# Patient Record
Sex: FEMALE | Race: WHITE | NOT HISPANIC OR LATINO | Employment: PART TIME | URBAN - METROPOLITAN AREA
[De-identification: names, ages, dates, MRNs, and addresses within clinical notes are randomized per-mention and may not be internally consistent; named-entity substitution may affect disease eponyms.]

---

## 2017-03-01 ENCOUNTER — TELEPHONE (OUTPATIENT)
Dept: OPHTHALMOLOGY | Facility: CLINIC | Age: 72
End: 2017-03-01

## 2017-03-20 RX ORDER — ROSUVASTATIN CALCIUM 40 MG/1
TABLET, COATED ORAL
Qty: 30 TABLET | Refills: 0 | Status: SHIPPED | OUTPATIENT
Start: 2017-03-20 | End: 2017-03-20 | Stop reason: SDUPTHER

## 2017-03-20 RX ORDER — ROSUVASTATIN CALCIUM 40 MG/1
TABLET, COATED ORAL
Qty: 30 TABLET | Refills: 0 | Status: SHIPPED | OUTPATIENT
Start: 2017-03-20 | End: 2017-08-22 | Stop reason: SDUPTHER

## 2017-05-03 ENCOUNTER — TELEPHONE (OUTPATIENT)
Dept: INTERNAL MEDICINE | Facility: CLINIC | Age: 72
End: 2017-05-03

## 2017-05-03 DIAGNOSIS — I10 ESSENTIAL HYPERTENSION, BENIGN: Primary | ICD-10-CM

## 2017-05-03 NOTE — TELEPHONE ENCOUNTER
----- Message from Corina Salmon sent at 5/3/2017  9:15 AM CDT -----  Doctor appointment and lab have been scheduled.  Please link lab orders to the lab appointment.  Date of doctor appointment: 9-5   Physical or EP:  physical  Date of lab appointment:  8-29  Comments:

## 2017-05-15 RX ORDER — ROSUVASTATIN CALCIUM 40 MG/1
TABLET, COATED ORAL
Qty: 30 TABLET | Refills: 0 | Status: SHIPPED | OUTPATIENT
Start: 2017-05-15 | End: 2019-07-18

## 2017-05-29 RX ORDER — ALPRAZOLAM 0.5 MG/1
TABLET ORAL
Qty: 20 TABLET | Refills: 1 | Status: SHIPPED | OUTPATIENT
Start: 2017-05-29 | End: 2018-07-03 | Stop reason: SDUPTHER

## 2017-08-01 RX ORDER — AMLODIPINE BESYLATE 5 MG/1
TABLET ORAL
Qty: 90 TABLET | Refills: 3 | Status: SHIPPED | OUTPATIENT
Start: 2017-08-01 | End: 2018-08-08 | Stop reason: SDUPTHER

## 2017-08-01 RX ORDER — LISINOPRIL 40 MG/1
TABLET ORAL
Qty: 90 TABLET | Refills: 3 | Status: SHIPPED | OUTPATIENT
Start: 2017-08-01 | End: 2018-08-08 | Stop reason: SDUPTHER

## 2017-08-15 ENCOUNTER — CLINICAL SUPPORT (OUTPATIENT)
Dept: CARDIOLOGY | Facility: CLINIC | Age: 72
End: 2017-08-15
Payer: MEDICARE

## 2017-08-15 ENCOUNTER — LAB VISIT (OUTPATIENT)
Dept: LAB | Facility: HOSPITAL | Age: 72
End: 2017-08-15
Attending: INTERNAL MEDICINE
Payer: MEDICARE

## 2017-08-15 DIAGNOSIS — I42.9 CARDIOMYOPATHY: ICD-10-CM

## 2017-08-15 DIAGNOSIS — I44.7 LBBB (LEFT BUNDLE BRANCH BLOCK): ICD-10-CM

## 2017-08-15 DIAGNOSIS — I25.84 CORONARY ARTERY DISEASE DUE TO CALCIFIED CORONARY LESION: ICD-10-CM

## 2017-08-15 DIAGNOSIS — I25.10 CORONARY ARTERY DISEASE DUE TO CALCIFIED CORONARY LESION: ICD-10-CM

## 2017-08-15 DIAGNOSIS — E78.00 HYPERCHOLESTEREMIA: ICD-10-CM

## 2017-08-15 DIAGNOSIS — I10 ESSENTIAL HYPERTENSION: ICD-10-CM

## 2017-08-15 LAB
ALT SERPL W/O P-5'-P-CCNC: 11 U/L
ANION GAP SERPL CALC-SCNC: 10 MMOL/L
AST SERPL-CCNC: 20 U/L
BUN SERPL-MCNC: 9 MG/DL
CALCIUM SERPL-MCNC: 9.2 MG/DL
CHLORIDE SERPL-SCNC: 104 MMOL/L
CHOLEST/HDLC SERPL: 2.6 {RATIO}
CO2 SERPL-SCNC: 25 MMOL/L
CREAT SERPL-MCNC: 0.9 MG/DL
DIASTOLIC DYSFUNCTION: NO
EST. GFR  (AFRICAN AMERICAN): >60 ML/MIN/1.73 M^2
EST. GFR  (NON AFRICAN AMERICAN): >60 ML/MIN/1.73 M^2
ESTIMATED PA SYSTOLIC PRESSURE: 22.85
GLUCOSE SERPL-MCNC: 101 MG/DL
HDL/CHOLESTEROL RATIO: 38.7 %
HDLC SERPL-MCNC: 168 MG/DL
HDLC SERPL-MCNC: 65 MG/DL
LDLC SERPL CALC-MCNC: 74.6 MG/DL
NONHDLC SERPL-MCNC: 103 MG/DL
POTASSIUM SERPL-SCNC: 4.2 MMOL/L
RETIRED EF AND QEF - SEE NOTES: 55 (ref 55–65)
SODIUM SERPL-SCNC: 139 MMOL/L
TRICUSPID VALVE REGURGITATION: NORMAL
TRIGL SERPL-MCNC: 142 MG/DL

## 2017-08-15 PROCEDURE — 93306 TTE W/DOPPLER COMPLETE: CPT | Mod: S$GLB,,, | Performed by: INTERNAL MEDICINE

## 2017-08-22 ENCOUNTER — LAB VISIT (OUTPATIENT)
Dept: LAB | Facility: HOSPITAL | Age: 72
End: 2017-08-22
Attending: INTERNAL MEDICINE
Payer: MEDICARE

## 2017-08-22 ENCOUNTER — OFFICE VISIT (OUTPATIENT)
Dept: CARDIOLOGY | Facility: CLINIC | Age: 72
End: 2017-08-22
Payer: MEDICARE

## 2017-08-22 VITALS
DIASTOLIC BLOOD PRESSURE: 76 MMHG | HEIGHT: 65 IN | WEIGHT: 149.38 LBS | SYSTOLIC BLOOD PRESSURE: 116 MMHG | HEART RATE: 54 BPM | BODY MASS INDEX: 24.89 KG/M2

## 2017-08-22 DIAGNOSIS — I25.84 CORONARY ARTERY DISEASE DUE TO CALCIFIED CORONARY LESION: ICD-10-CM

## 2017-08-22 DIAGNOSIS — I10 ESSENTIAL HYPERTENSION, BENIGN: ICD-10-CM

## 2017-08-22 DIAGNOSIS — R00.1 BRADYCARDIA: ICD-10-CM

## 2017-08-22 DIAGNOSIS — E78.00 HYPERCHOLESTEREMIA: Primary | ICD-10-CM

## 2017-08-22 DIAGNOSIS — I25.10 CORONARY ARTERY DISEASE DUE TO CALCIFIED CORONARY LESION: ICD-10-CM

## 2017-08-22 DIAGNOSIS — I44.7 LBBB (LEFT BUNDLE BRANCH BLOCK): ICD-10-CM

## 2017-08-22 DIAGNOSIS — I10 ESSENTIAL HYPERTENSION: ICD-10-CM

## 2017-08-22 LAB
ALBUMIN SERPL BCP-MCNC: 3.5 G/DL
ALP SERPL-CCNC: 76 U/L
ALT SERPL W/O P-5'-P-CCNC: 13 U/L
ANION GAP SERPL CALC-SCNC: 8 MMOL/L
AST SERPL-CCNC: 20 U/L
BASOPHILS # BLD AUTO: 0.03 K/UL
BASOPHILS NFR BLD: 0.5 %
BILIRUB SERPL-MCNC: 0.4 MG/DL
BUN SERPL-MCNC: 13 MG/DL
CALCIUM SERPL-MCNC: 9.7 MG/DL
CHLORIDE SERPL-SCNC: 105 MMOL/L
CO2 SERPL-SCNC: 27 MMOL/L
CREAT SERPL-MCNC: 1 MG/DL
DIFFERENTIAL METHOD: NORMAL
EOSINOPHIL # BLD AUTO: 0.3 K/UL
EOSINOPHIL NFR BLD: 4.8 %
ERYTHROCYTE [DISTWIDTH] IN BLOOD BY AUTOMATED COUNT: 12.7 %
EST. GFR  (AFRICAN AMERICAN): >60 ML/MIN/1.73 M^2
EST. GFR  (NON AFRICAN AMERICAN): 56 ML/MIN/1.73 M^2
GLUCOSE SERPL-MCNC: 100 MG/DL
HCT VFR BLD AUTO: 40.7 %
HGB BLD-MCNC: 13.6 G/DL
LYMPHOCYTES # BLD AUTO: 1.8 K/UL
LYMPHOCYTES NFR BLD: 32.4 %
MCH RBC QN AUTO: 30.9 PG
MCHC RBC AUTO-ENTMCNC: 33.4 G/DL
MCV RBC AUTO: 93 FL
MONOCYTES # BLD AUTO: 0.5 K/UL
MONOCYTES NFR BLD: 8.1 %
NEUTROPHILS # BLD AUTO: 3.1 K/UL
NEUTROPHILS NFR BLD: 54 %
PLATELET # BLD AUTO: 244 K/UL
PMV BLD AUTO: 11.5 FL
POTASSIUM SERPL-SCNC: 4.8 MMOL/L
PROT SERPL-MCNC: 7 G/DL
RBC # BLD AUTO: 4.4 M/UL
SODIUM SERPL-SCNC: 140 MMOL/L
TSH SERPL DL<=0.005 MIU/L-ACNC: 2.92 UIU/ML
WBC # BLD AUTO: 5.68 K/UL

## 2017-08-22 PROCEDURE — 99499 UNLISTED E&M SERVICE: CPT | Mod: S$GLB,,, | Performed by: INTERNAL MEDICINE

## 2017-08-22 PROCEDURE — 1159F MED LIST DOCD IN RCRD: CPT | Mod: S$GLB,,, | Performed by: INTERNAL MEDICINE

## 2017-08-22 PROCEDURE — 85025 COMPLETE CBC W/AUTO DIFF WBC: CPT

## 2017-08-22 PROCEDURE — 36415 COLL VENOUS BLD VENIPUNCTURE: CPT | Mod: PO

## 2017-08-22 PROCEDURE — 3008F BODY MASS INDEX DOCD: CPT | Mod: S$GLB,,, | Performed by: INTERNAL MEDICINE

## 2017-08-22 PROCEDURE — 3078F DIAST BP <80 MM HG: CPT | Mod: S$GLB,,, | Performed by: INTERNAL MEDICINE

## 2017-08-22 PROCEDURE — 80053 COMPREHEN METABOLIC PANEL: CPT

## 2017-08-22 PROCEDURE — 93000 ELECTROCARDIOGRAM COMPLETE: CPT | Mod: S$GLB,,, | Performed by: INTERNAL MEDICINE

## 2017-08-22 PROCEDURE — 99999 PR PBB SHADOW E&M-EST. PATIENT-LVL III: CPT | Mod: PBBFAC,,, | Performed by: INTERNAL MEDICINE

## 2017-08-22 PROCEDURE — 3074F SYST BP LT 130 MM HG: CPT | Mod: S$GLB,,, | Performed by: INTERNAL MEDICINE

## 2017-08-22 PROCEDURE — 1126F AMNT PAIN NOTED NONE PRSNT: CPT | Mod: S$GLB,,, | Performed by: INTERNAL MEDICINE

## 2017-08-22 PROCEDURE — 84443 ASSAY THYROID STIM HORMONE: CPT

## 2017-08-22 PROCEDURE — 99213 OFFICE O/P EST LOW 20 MIN: CPT | Mod: S$GLB,,, | Performed by: INTERNAL MEDICINE

## 2017-08-22 RX ORDER — BROMPHENIRAMINE MALEATE, PSEUDOEPHEDRINE HYDROCHLORIDE, AND DEXTROMETHORPHAN HYDROBROMIDE 2; 30; 10 MG/5ML; MG/5ML; MG/5ML
SYRUP ORAL DAILY PRN
Refills: 0 | COMMUNITY
Start: 2017-07-31 | End: 2019-05-20

## 2017-08-22 NOTE — PROGRESS NOTES
Subjective:   Patient ID:  Nesha Landa is a 71 y.o. female who presents for follow-up of Coronary Artery Disease      Problem List:  CACS >600   Hyperlipidemia   HTN  LBBB  Cardiomyopathy - resolved    HPI:   Nesha Landa    She was going to the gym twice a week, but stopped recently.       Review of Systems   Constitution: Negative for malaise/fatigue, weight gain and weight loss.   Cardiovascular: Negative for chest pain, dyspnea on exertion, leg swelling and palpitations.   Respiratory: Negative for cough and sputum production.    Hematologic/Lymphatic: Does not bruise/bleed easily.   Musculoskeletal: Positive for back pain. Negative for arthritis and muscle cramps.   Gastrointestinal: Positive for change in bowel habit. Negative for abdominal pain and melena.       Current Outpatient Prescriptions   Medication Sig    alprazolam (XANAX) 0.5 MG tablet TAKE 1 TABLET BY MOUTH EVERY DAY AS NEEDED FOR ANXIETY    amlodipine (NORVASC) 5 MG tablet TAKE 1 TABLET ONE TIME DAILY    aspirin 81 MG Chew Take 81 mg by mouth once daily.    brompheniramine-pseudoeph-DM 2-30-10 mg/5 mL Syrp Take by mouth daily as needed.    butalbital-acetaminophen-caffeine -40 mg (FIORICET, ESGIC) -40 mg per tablet Take 1 tablet by mouth as needed.    carvedilol (COREG) 25 MG tablet TAKE 1 TABLET TWICE DAILY    cholecalciferol, vitamin D3, 1,000 unit capsule Take 1 capsule by mouth Daily.      coenzyme Q10 200 mg capsule Take 200 mg by mouth once daily.    cyanocobalamin, vitamin B-12, (VITAMIN B-12) 50 mcg tablet Take 1 tablet by mouth Daily.      dicyclomine (BENTYL) 20 mg tablet TK 1 T PO QID FOR 3 DAYS    fish oil-omega-3 fatty acids 300-1,000 mg capsule Take 1 g by mouth once daily.    fluticasone (FLONASE) 50 mcg/actuation nasal spray 1 spray once daily.    folic acid (FOLVITE) 400 MCG tablet Take 400 mcg by mouth once daily.    LACTOBACILLUS ACIDOPHILUS (ACIDOPHILUS ORAL) Take 1 capsule by mouth as needed.      "lisinopril (PRINIVIL,ZESTRIL) 40 MG tablet TAKE 1 TABLET ONE TIME DAILY    multivitamin (ONE DAILY MULTIVITAMIN) per tablet Take 1 tablet by mouth once daily.    omeprazole (PRILOSEC) 20 MG capsule Take 1 capsule by mouth Every PM.      ondansetron (ZOFRAN-ODT) 4 MG TbDL Take 1 tablet by mouth as needed.    polyethylene glycol (MIRALAX) 17 gram/dose powder Take by mouth Daily. Pt taking 1 capful prn.    promethazine (PHENERGAN) 25 MG tablet TK 1 T PO Q 4 H PRF NAUSEA AND VOMITING    rosuvastatin (CRESTOR) 40 MG Tab TAKE 1 TABLET BY MOUTH EVERY NIGHT AT BEDTIME (Patient taking differently: Pt taking 1/2 tablet once a day.)    SIMETHICONE (GAS-X ORAL) Take 1 tablet by mouth as needed.         Social History   Substance Use Topics    Smoking status: Never Smoker    Smokeless tobacco: Never Used    Alcohol use No         Objective:     Physical Exam   Constitutional: She is oriented to person, place, and time. She appears well-developed and well-nourished.   /76   Pulse (!) 54   Ht 5' 5" (1.651 m)   Wt 67.8 kg (149 lb 5.8 oz)   BMI 24.86 kg/m²      HENT:   Head: Normocephalic.   Neck: No JVD present. Carotid bruit is not present.   Cardiovascular: Normal rate, regular rhythm, S1 normal and S2 normal.  Exam reveals no gallop.    No murmur heard.  Pulses:       Posterior tibial pulses are 2+ on the right side, and 2+ on the left side.   Pulmonary/Chest: Effort normal. She has no wheezes. She has no rales. Chest wall is not dull to percussion.   Abdominal: Soft. She exhibits no abdominal bruit. There is no splenomegaly or hepatomegaly. There is no tenderness.   Musculoskeletal:        Right lower leg: She exhibits no edema.        Left lower leg: She exhibits no edema.   Neurological: She is alert and oriented to person, place, and time. Gait normal.   Skin: Skin is warm and dry. No bruising and no rash noted. No cyanosis. Nails show no clubbing.   Psychiatric: She has a normal mood and affect. Her " speech is normal and behavior is normal. Judgment normal. Cognition and memory are normal.           Lab Results   Component Value Date    CHOL 168 08/15/2017    HDL 65 08/15/2017    LDLCALC 74.6 08/15/2017    TRIG 142 08/15/2017    CHOLHDL 38.7 08/15/2017     Lab Results   Component Value Date     08/22/2017    CREATININE 1.0 08/22/2017    BUN 13 08/22/2017     08/22/2017    K 4.8 08/22/2017     08/22/2017    CO2 27 08/22/2017     Lab Results   Component Value Date    ALT 13 08/22/2017    AST 20 08/22/2017    ALKPHOS 76 08/22/2017    BILITOT 0.4 08/22/2017         Assessment and Plan:     1. Hypercholesteremia    2. Coronary artery disease due to calcified coronary lesion    3. LBBB (left bundle branch block)    4. Bradycardia - asymptomatic    5. Essential hypertension      Hypercholesteremia  Comments:  Improved.  Continue same dose of rosuvastatin.   Orders:  -     Lipid panel; Future; Expected date: 02/28/2019  -     ALT (SGPT); Future; Expected date: 02/28/2019  -     AST (SGOT); Future; Expected date: 02/28/2019    Coronary artery disease due to calcified coronary lesion    LBBB (left bundle branch block)  -     EKG 12-lead  -     EKG 12-lead; Future; Expected date: 02/28/2019    Bradycardia - asymptomatic  Comments:  Likely due to beta blocker.   Do not increase carvedilol.    Essential hypertension  Comments:  Stable.  Continue same meds.  Orders:  -     Basic metabolic panel; Future; Expected date: 02/28/2019       RTC in 18 months.

## 2017-08-22 NOTE — PATIENT INSTRUCTIONS
LDL - bad type - improves with diet and medications: typically statins; most other                   medications that lower LDL but have not been proven to prevent heart attacks.             May not improve significantly with exercise alone.             Ideally about 70    HDL - good type - improves with exercise             Ideally greater than 50    TGs (triglycerides) - also bad - can change very quickly and considerably with food -           improve with diet and exercise            In some cases a low carbohydrate diet will lower TGs better than a low fat diet.            Ideal range     Sugar, fat and cholesterol in food:     A sensible diet that limits the intake of sugars, saturated (bad) fats and trans fats while increasing the intake of unsaturated (good)  fats and plant proteins is the basis of the current dietary recommendations.      We now recommend drastically reducing the intake of sugar. There is less emphasis on excluding fat. And cholesterol in our food is no longer a significant concern. However please do not confuse this with the role of cholesterol in our blood and arteries. It is still cholesterol that clogs up our arteries whether it comes from our food or is manufactured by our bodies.       Most foods that are high in cholesterol are also high in saturated fat. But there is way more saturated fat than cholesterol in these foods. On the other hand there are a handful of foods that are high in cholesterol but do not contain much saturated fat: eggs, shrimp, crab legs and crawfish are OK to eat.       Saturated fat is the bad fat - you should limit your intake of this. Deep fried foods, meats and other animal fats are high in saturated fat. Cookies, donuts and most dessert and cakes are usually high in both saturated fat and sugar.       Unsaturated fat is the good fat. It contains the same number of calories as saturated fat but does not get deposited in our arteries. The Mediterranean  style diet encourages the intake of unsaturated fat - olive oil, avocado and unsalted nuts.      You should eat a few servings of vegetables (and fruit as long as you are not diabetic) everyday. Substitute some plant proteins in place of meat: soy, beans, lentils, quinoa and oatmeal.      Do not use stick butter or stick margarine. Butter that comes in a tub is soft butter. It consists of 1/2 butter and 1/2 canola or another type of vegetable oil. It is fine to use that.       Trans fats should definitely be avoided. Most foods that are labelled as containing 0 gms of trans fat can still contain several hundred milligrams of trans fat: creamer, margarine, refrigerator dough, deep fried foods, ready made frosting, potato, corn and torilla chips, cakes, cookies, pie crusts and crackers containing shortening made with hydrogenated vegetable oil.

## 2017-09-05 ENCOUNTER — OFFICE VISIT (OUTPATIENT)
Dept: INTERNAL MEDICINE | Facility: CLINIC | Age: 72
End: 2017-09-05
Payer: MEDICARE

## 2017-09-05 VITALS
TEMPERATURE: 99 F | SYSTOLIC BLOOD PRESSURE: 118 MMHG | WEIGHT: 148.81 LBS | HEIGHT: 65 IN | DIASTOLIC BLOOD PRESSURE: 72 MMHG | BODY MASS INDEX: 24.79 KG/M2 | HEART RATE: 79 BPM | RESPIRATION RATE: 16 BRPM

## 2017-09-05 DIAGNOSIS — M85.80 OSTEOPENIA, UNSPECIFIED LOCATION: ICD-10-CM

## 2017-09-05 DIAGNOSIS — I25.10 CORONARY ARTERY DISEASE DUE TO CALCIFIED CORONARY LESION: ICD-10-CM

## 2017-09-05 DIAGNOSIS — I25.84 CORONARY ARTERY DISEASE DUE TO CALCIFIED CORONARY LESION: ICD-10-CM

## 2017-09-05 DIAGNOSIS — Z78.0 POSTMENOPAUSAL STATUS (AGE-RELATED) (NATURAL): ICD-10-CM

## 2017-09-05 DIAGNOSIS — E78.00 HYPERCHOLESTEREMIA: ICD-10-CM

## 2017-09-05 DIAGNOSIS — Z00.00 ENCOUNTER FOR PREVENTIVE HEALTH EXAMINATION: Primary | ICD-10-CM

## 2017-09-05 DIAGNOSIS — I10 ESSENTIAL HYPERTENSION: ICD-10-CM

## 2017-09-05 PROCEDURE — 99499 UNLISTED E&M SERVICE: CPT | Mod: S$GLB,,, | Performed by: INTERNAL MEDICINE

## 2017-09-05 PROCEDURE — 99397 PER PM REEVAL EST PAT 65+ YR: CPT | Mod: S$GLB,,, | Performed by: INTERNAL MEDICINE

## 2017-09-05 PROCEDURE — 99999 PR PBB SHADOW E&M-EST. PATIENT-LVL III: CPT | Mod: PBBFAC,,, | Performed by: INTERNAL MEDICINE

## 2017-09-05 NOTE — PROGRESS NOTES
History of present illness:  71-year-old lady in today for general health assessment.  Next    Current medication:  All medications noted and reviewed in the electronic medical record medication list.    Review of systems:  General: no fever, chills, generalized body aches. No unexpected weight loss.  Eyes:  No visual disturbances.  HEENT:  No hoarseness, dysphagia, ear pain.  Respiratory:  No cough, no shortness of breath.  Cardiovascular: no chest pain, palpitations, cough, exertional limb pain. No edema.  GI: no nausea, vomiting.  No abdominal pain. No change in bowel habits.  No melena, no hematochezia.  : no dysuria. No change in the color or character of the urine. No urinary frequency.  Musculoskeletal: no joint pain or swelling.  Neurologic:  No focal neurological complaints.  No headaches.  Skin:  No rashes or other concerns.  Psych:  No emotional issues    Past medical history, past surgical history, family medical history and social histories noted reviewed and updated in the electronic medical record history section.    Health screenings:  Colonoscopy up-to-date with her outside gastroenterologist.  Mammogram November 2016.  GYN care up-to-date.  13 Valent and 23 Valent pneumococcal vaccines have been administered.  Bone densitometry July 2014.  Eye exam up-to-date.    Physical examination:  GENERAL:  Alert, appropriately groomed, no acute distress.  VS: Blood pressure taken manually by this examiner is 104/72.  EYES: sclerae white ,nonicteric. PERRL.  HEENT:  Normocephalic. Ear canals and tympanic membranes normal. Mouth and pharynx normal. No thyromegaly. Trachea midline and freely mobile.  LUNGS:  Clear to ascultation and normal to percussion.  CARDIOVASCULAR:  Normal heart sounds.  No significant murmur. Carotids full bilaterally without bruit.  Pedal pulses intact .  No abdominal bruit.  No peripheral extremity edema.  GI: the abdomen is soft, no distension. No masses , tenderness, organomegaly.     LYMPHATIC:  No axillary, inguinal , cervical adenopathy.  MUSCULOSKELETAL:  Range of motion, stability and strength of the right and left upper and lower extremities normal. No swollen or tender joints  NEUROLOGIC:  DTR's normal. No gross motor or sensory deficits apparent, gait normal.  SKIN:  No rashes.   MS:  Alert, oriented , affect and mood all appropriate    Data:  Recent laboratory data noted reviewed all reasonable.    Impression:  71-year-old lady in reasonably good health.  Hypertension is well controlled.  Dyslipidemia on statin therapy.  CAD clinically stable and followed by cardiology.  Osteopenia.    Plan:  Update bone densitometry.  Continue current healthy lifestyle.  Return to clinic one year for annual health assessment or before as needed

## 2017-09-21 ENCOUNTER — APPOINTMENT (OUTPATIENT)
Dept: RADIOLOGY | Facility: CLINIC | Age: 72
End: 2017-09-21
Attending: INTERNAL MEDICINE
Payer: MEDICARE

## 2017-09-21 DIAGNOSIS — Z78.0 POSTMENOPAUSAL STATUS (AGE-RELATED) (NATURAL): ICD-10-CM

## 2017-09-21 PROCEDURE — 77080 DXA BONE DENSITY AXIAL: CPT | Mod: TC

## 2017-09-21 PROCEDURE — 77080 DXA BONE DENSITY AXIAL: CPT | Mod: 26,,, | Performed by: INTERNAL MEDICINE

## 2017-10-09 ENCOUNTER — TELEPHONE (OUTPATIENT)
Dept: INTERNAL MEDICINE | Facility: CLINIC | Age: 72
End: 2017-10-09

## 2017-10-09 NOTE — TELEPHONE ENCOUNTER
----- Message from Corina Viky sent at 10/9/2017  3:24 PM CDT -----  Contact: pt [phone]  Patient would like to get test results.  Name of test (lab, mammo, etc.):  Bone density  Date of test:  9-  Ordering provider:   [facility] was the test performed[facility]   Comments:

## 2017-10-13 NOTE — TELEPHONE ENCOUNTER
Advise BMD has declined a bit since last test.  The bone thinning is in the range that medication is recommended.  If agreeable we will send in oral med to be taken once a week.  If any concerns let me know. Let me know if agreeable.

## 2017-10-16 NOTE — TELEPHONE ENCOUNTER
Spoke with pt to advise.    Verbalized understanding.    1. Pt requests that name (s) of medications that are recommended.    Please advise.    Thanks.

## 2017-10-17 NOTE — TELEPHONE ENCOUNTER
Spoke with pt earlier today, who advises that she spoke with her son, who advised that she needs to take whatever med the MD prescribes as they want to prevent any fractures.    Please advise which is recommended and pt is agreeable.    Thanks.

## 2017-10-17 NOTE — TELEPHONE ENCOUNTER
Oral meds--fosamax,boniva,actonel.  Iv infusion yearly--reclast.  Sq injection q 6 mos x 2 years--prolia

## 2017-10-18 RX ORDER — ALENDRONATE SODIUM 70 MG/1
70 TABLET ORAL
Qty: 12 TABLET | Refills: 3 | Status: SHIPPED | OUTPATIENT
Start: 2017-10-18 | End: 2019-11-15

## 2017-10-19 NOTE — TELEPHONE ENCOUNTER
Spoke with pt to advise.  Verbalized understanding and requested that this is called in to [facility].    Therefore, we attempted to cancel the rx at [facility] but they have not yet processed this in their system and and thus are unable to cancel something that they cannot see.    So we called the rx in to [facility] for the pt and left on the voicemail.

## 2017-11-01 RX ORDER — CARVEDILOL 25 MG/1
TABLET ORAL
Qty: 180 TABLET | Refills: 3 | Status: SHIPPED | OUTPATIENT
Start: 2017-11-01 | End: 2018-01-29 | Stop reason: SDUPTHER

## 2017-11-21 RX ORDER — ROSUVASTATIN CALCIUM 40 MG/1
TABLET, COATED ORAL
Qty: 90 TABLET | Refills: 0 | Status: SHIPPED | OUTPATIENT
Start: 2017-11-21 | End: 2018-03-07 | Stop reason: SDUPTHER

## 2018-01-05 ENCOUNTER — TELEPHONE (OUTPATIENT)
Dept: INTERNAL MEDICINE | Facility: CLINIC | Age: 73
End: 2018-01-05

## 2018-01-05 NOTE — TELEPHONE ENCOUNTER
----- Message from Alban Parnell sent at 1/5/2018  9:26 AM CST -----  Contact: Pt at [phone]  Pt said script alendronate (FOSAMAX) 70 MG tablet is bothering her joints (burning sensation).

## 2018-01-05 NOTE — TELEPHONE ENCOUNTER
Pt advises that she has been experiencing a burning sensation in her joints since starting Fosamax.    Please advise if this is a common side effect or what should her next step be.    Thanks.

## 2018-01-09 NOTE — TELEPHONE ENCOUNTER
----- Message from Alban Parnell sent at 1/8/2018 12:20 PM CST -----  Contact: Pt at [phone]  Pt requesting a call back regarding message that was left on 1/5/18.

## 2018-01-09 NOTE — TELEPHONE ENCOUNTER
Left msg on her voicemail with her name.  I gave 's comments and recommendation.  I said to make an appt if not improving off fosamax

## 2018-01-30 RX ORDER — CARVEDILOL 25 MG/1
TABLET ORAL
Qty: 180 TABLET | Refills: 3 | Status: SHIPPED | OUTPATIENT
Start: 2018-01-30 | End: 2019-02-14 | Stop reason: SDUPTHER

## 2018-01-31 ENCOUNTER — PES CALL (OUTPATIENT)
Dept: ADMINISTRATIVE | Facility: CLINIC | Age: 73
End: 2018-01-31

## 2018-03-07 ENCOUNTER — OFFICE VISIT (OUTPATIENT)
Dept: INTERNAL MEDICINE | Facility: CLINIC | Age: 73
End: 2018-03-07
Payer: MEDICARE

## 2018-03-07 VITALS
WEIGHT: 150 LBS | OXYGEN SATURATION: 96 % | HEIGHT: 65 IN | TEMPERATURE: 98 F | DIASTOLIC BLOOD PRESSURE: 60 MMHG | RESPIRATION RATE: 15 BRPM | HEART RATE: 65 BPM | BODY MASS INDEX: 24.99 KG/M2 | SYSTOLIC BLOOD PRESSURE: 106 MMHG

## 2018-03-07 DIAGNOSIS — I25.10 CORONARY ARTERY DISEASE DUE TO CALCIFIED CORONARY LESION: ICD-10-CM

## 2018-03-07 DIAGNOSIS — I44.7 LBBB (LEFT BUNDLE BRANCH BLOCK): ICD-10-CM

## 2018-03-07 DIAGNOSIS — Z00.00 ENCOUNTER FOR PREVENTIVE HEALTH EXAMINATION: Primary | ICD-10-CM

## 2018-03-07 DIAGNOSIS — E78.00 HYPERCHOLESTEREMIA: ICD-10-CM

## 2018-03-07 DIAGNOSIS — R00.1 BRADYCARDIA: ICD-10-CM

## 2018-03-07 DIAGNOSIS — Z13.5 SCREENING FOR EYE CONDITION: ICD-10-CM

## 2018-03-07 DIAGNOSIS — E55.9 VITAMIN D DEFICIENCY: ICD-10-CM

## 2018-03-07 DIAGNOSIS — I42.9 CARDIOMYOPATHY, UNSPECIFIED TYPE: ICD-10-CM

## 2018-03-07 DIAGNOSIS — I25.84 CORONARY ARTERY DISEASE DUE TO CALCIFIED CORONARY LESION: ICD-10-CM

## 2018-03-07 DIAGNOSIS — Z12.31 ENCOUNTER FOR SCREENING MAMMOGRAM FOR BREAST CANCER: ICD-10-CM

## 2018-03-07 DIAGNOSIS — M85.80 OSTEOPENIA, UNSPECIFIED LOCATION: ICD-10-CM

## 2018-03-07 DIAGNOSIS — I10 ESSENTIAL HYPERTENSION: ICD-10-CM

## 2018-03-07 DIAGNOSIS — N18.30 STAGE 3 CHRONIC KIDNEY DISEASE: ICD-10-CM

## 2018-03-07 PROCEDURE — 99999 PR PBB SHADOW E&M-EST. PATIENT-LVL V: CPT | Mod: PBBFAC,,, | Performed by: NURSE PRACTITIONER

## 2018-03-07 PROCEDURE — G0439 PPPS, SUBSEQ VISIT: HCPCS | Mod: S$GLB,,, | Performed by: NURSE PRACTITIONER

## 2018-03-07 PROCEDURE — 99499 UNLISTED E&M SERVICE: CPT | Mod: S$GLB,,, | Performed by: NURSE PRACTITIONER

## 2018-03-07 NOTE — PATIENT INSTRUCTIONS
Counseling and Referral of Other Preventative  (Italic type indicates deductible and co-insurance are waived)    Patient Name: Nesha Landa  Today's Date: 3/7/2018    Health Maintenance       Date Due Completion Date    Zoster Vaccine CDC handout given   ---    TETANUS VACCINE In future for injury or trauma   ---    Mammogram 11/23/2018 11/23/2016    High Dose Statin Already taking    3/7/2018    DEXA SCAN 09/21/2019 9/21/2017    Lipid Panel 08/15/2018   8/15/2017    Colonoscopy 07/15/2019   7/15/2014                The following information is provided to all patients.  This information is to help you find resources for any of the problems found today that may be affecting your health:                Living healthy guide: www.[facility].[place].gov      Understanding Diabetes: www.diabetes.org      Eating healthy: www.cdc.gov/healthyweight      [facility] home safety checklist: www.cdc.gov/steadi/patient.html      Agency on Aging: www.goea.[place].[facility]      Alcoholics anonymous (AA): www.aa.org      Physical Activity: www.nohemi.nih.gov/kt4rinb      Tobacco use: www.quitwithusla.org

## 2018-03-07 NOTE — PROGRESS NOTES
"Nesha Landa presented for a  Medicare AWV and comprehensive Health Risk Assessment today. The following components were reviewed and updated:    · Medical history  · Family History  · Social history  · Allergies and Current Medications  · Health Risk Assessment  · Health Maintenance  · Care Team -external GI, ENT- updated in epic    ** See Completed Assessments for Annual Wellness Visit within the encounter summary.**       The following assessments were completed:  · Living Situation  · CAGE  · Depression Screening  · Timed Get Up and Go  · Whisper Test  · Cognitive Function Screening  · Nutrition Screening  · ADL Screening  · PAQ Screening    Vitals:    03/07/18 0825   BP: 106/60   Pulse: 65   Resp: 15   Temp: 98.3 °F (36.8 °C)   TempSrc: Oral   SpO2: 96%   Weight: 68 kg (150 lb)   Height: 5' 4.5" (1.638 m)     Body mass index is 25.35 kg/m².  Physical Exam   Constitutional: She is oriented to person, place, and time. She appears well-developed and well-nourished.   HENT:   Head: Normocephalic.   Eyes: No scleral icterus.   Cardiovascular: Normal rate, regular rhythm and normal heart sounds.    No murmur heard.  Pulmonary/Chest: Effort normal and breath sounds normal.   Abdominal: Soft. Bowel sounds are normal. She exhibits no mass.   Musculoskeletal: Normal range of motion. She exhibits edema.   Neurological: She is alert and oriented to person, place, and time. She exhibits normal muscle tone.   Skin: Skin is warm and dry.   Psychiatric: She has a normal mood and affect.   Nursing note and vitals reviewed.        Diagnoses and health risks identified today and associated recommendations/orders:    1. Bradycardia - asymptomatic  Stable- followed by cardiology    2. Vitamin D deficiency  Stable- followed by PCP    3. Coronary artery disease due to calcified coronary lesion  Stable- followed by cardiology    4. LBBB (left bundle branch block)  Stable- followed by cardiology    5. Hypercholesteremia  Stable- followed " by cardiology    6. Essential hypertension  Stable- followed by cardiology    7. Encounter for preventive health examination  Assessments completed  Preventative health recommendations reviewed       8. Encounter for screening mammogram for breast cancer  Stable- followed by PCP  - Mammo Digital Screening Bilat with Tomosynthesis_CAD; Future    9. Screening for eye condition  Stable- followed by PCP  - Ambulatory Referral to Ophthalmology    10. Osteopenia, unspecified location  Stable- followed by PCP- on fosamax, vit d    11. Stage 3 chronic kidney disease  Stable- followed by PCP    12. Cardiomyopathy, unspecified type  Stable- followed by cardiology      Provided Nesha with a 5-10 year written screening schedule and personal prevention plan. Recommendations were developed using the USPSTF age appropriate recommendations. Education, counseling, and referrals were provided as needed. After Visit Summary printed and given to patient which includes a list of additional screenings\tests needed. Mammo ordered- she will schedule annual eye appt.    Follow-up in about 1 year (around 3/7/2019) for HRA.    Rosa Elena Rosales NP

## 2018-04-05 ENCOUNTER — OFFICE VISIT (OUTPATIENT)
Dept: OPTOMETRY | Facility: CLINIC | Age: 73
End: 2018-04-05
Payer: MEDICARE

## 2018-04-05 DIAGNOSIS — I10 ESSENTIAL HYPERTENSION: Primary | ICD-10-CM

## 2018-04-05 DIAGNOSIS — H52.7 REFRACTIVE ERROR: ICD-10-CM

## 2018-04-05 DIAGNOSIS — Z96.1 PSEUDOPHAKIA OF BOTH EYES: ICD-10-CM

## 2018-04-05 PROCEDURE — 99999 PR PBB SHADOW E&M-EST. PATIENT-LVL II: CPT | Mod: PBBFAC,,, | Performed by: OPTOMETRIST

## 2018-04-05 PROCEDURE — 92014 COMPRE OPH EXAM EST PT 1/>: CPT | Mod: S$GLB,,, | Performed by: OPTOMETRIST

## 2018-04-05 PROCEDURE — 99499 UNLISTED E&M SERVICE: CPT | Mod: S$PBB,,, | Performed by: OPTOMETRIST

## 2018-04-05 RX ORDER — KETOCONAZOLE 20 MG/ML
SHAMPOO, SUSPENSION TOPICAL
Refills: 2 | COMMUNITY
Start: 2018-03-05 | End: 2019-04-16

## 2018-04-05 RX ORDER — AMOXICILLIN 500 MG/1
CAPSULE ORAL
Refills: 0 | COMMUNITY
Start: 2018-01-10 | End: 2018-11-13

## 2018-04-05 RX ORDER — FLUOCINONIDE TOPICAL SOLUTION USP, 0.05% 0.5 MG/ML
SOLUTION TOPICAL
Refills: 3 | COMMUNITY
Start: 2018-03-05 | End: 2019-04-16

## 2018-04-05 RX ORDER — AMOXICILLIN 875 MG/1
TABLET, FILM COATED ORAL
Refills: 0 | COMMUNITY
Start: 2018-02-08 | End: 2018-11-13

## 2018-04-05 NOTE — PROGRESS NOTES
PATEL JOSHI 04/2017  No complaints or problems with eyes  No eye drops used  Light sens since cat surgery  Blood pressure doing fine per pt    Last edited by Jerzy Messina, OD on 4/5/2018  2:19 PM. (History)            Assessment /Plan     For exam results, see Encounter Report.    Essential hypertension    Refractive error    Pseudophakia of both eyes      1. No HTN ret. Monitor condition. Patient to report any changes. RTC 1 year recheck.  2. Spec Rx given. Different lens options discussed with patient. RTC 1 year full exam.  3. Monitor condition. Patient to report any changes. RTC 1 year recheck.

## 2018-04-12 ENCOUNTER — TELEPHONE (OUTPATIENT)
Dept: INTERNAL MEDICINE | Facility: CLINIC | Age: 73
End: 2018-04-12

## 2018-04-12 ENCOUNTER — HOSPITAL ENCOUNTER (OUTPATIENT)
Dept: RADIOLOGY | Facility: HOSPITAL | Age: 73
Discharge: HOME | End: 2018-04-12
Attending: NURSE PRACTITIONER
Payer: MEDICARE

## 2018-04-12 DIAGNOSIS — Z12.31 ENCOUNTER FOR SCREENING MAMMOGRAM FOR BREAST CANCER: ICD-10-CM

## 2018-04-12 PROCEDURE — 77067 SCR MAMMO BI INCL CAD: CPT | Mod: TC,PO

## 2018-04-12 PROCEDURE — 77063 BREAST TOMOSYNTHESIS BI: CPT | Mod: 26,,, | Performed by: RADIOLOGY

## 2018-04-12 PROCEDURE — 77067 SCR MAMMO BI INCL CAD: CPT | Mod: 26,,, | Performed by: RADIOLOGY

## 2018-05-25 RX ORDER — ROSUVASTATIN CALCIUM 40 MG/1
TABLET, COATED ORAL
Qty: 90 TABLET | Refills: 0 | Status: SHIPPED | OUTPATIENT
Start: 2018-05-25 | End: 2018-11-28 | Stop reason: SDUPTHER

## 2018-07-02 RX ORDER — ALPRAZOLAM 0.5 MG/1
0.5 TABLET ORAL DAILY PRN
Qty: 20 TABLET | Refills: 1 | Status: CANCELLED | OUTPATIENT
Start: 2018-07-02

## 2018-07-02 NOTE — TELEPHONE ENCOUNTER
----- Message from Ana Peñaloza sent at 7/2/2018  9:25 AM CDT -----  Contact: Patient [phone]  Prescription Request:     Name of medication: alprazolam (XANAX) 0.5 MG tablet    Reason for request: Refill    [facility] [place] [facility] [address]  [address] [facility] [address] [facility] [address]    Please advise.    Thank You

## 2018-07-03 RX ORDER — ALPRAZOLAM 0.5 MG/1
0.5 TABLET ORAL DAILY PRN
Qty: 20 TABLET | Refills: 1 | Status: SHIPPED | OUTPATIENT
Start: 2018-07-03 | End: 2018-09-28 | Stop reason: SDUPTHER

## 2018-07-03 NOTE — TELEPHONE ENCOUNTER
"----- Message from Stephanie Fernandez sent at 7/3/2018  8:25 AM CDT -----  Contact: [phone]  RX request - refill or new RX.  Is this a refill or new RX:  Refill  RX name and strength: alprazolam (XANAX) 0.5 MG tablet  Directions:   Is this a 30 day or 90 day RX:    Local pharmacy or mail order pharmacy:    Pharmacy name and phone # (DON'T enter "on file" or "in chart"): [facility] [address] [place] [address] [address] AT [facility] OF [address] [phone] (Phone)  [phone] (Fax)    Comments:        "

## 2018-07-09 ENCOUNTER — TELEPHONE (OUTPATIENT)
Dept: INTERNAL MEDICINE | Facility: CLINIC | Age: 73
End: 2018-07-09

## 2018-07-09 DIAGNOSIS — I10 ESSENTIAL HYPERTENSION, BENIGN: Primary | ICD-10-CM

## 2018-07-09 NOTE — TELEPHONE ENCOUNTER
----- Message from Galilea Jaime sent at 7/9/2018 10:11 AM CDT -----  Contact: Self  Doctor appointment and lab have been scheduled.  Please link lab orders to the lab appointment.  Date of doctor appointment:  11/13  Physical or EP:  Epp  Date of lab appointment:  11/6  Comments:

## 2018-08-08 RX ORDER — AMLODIPINE BESYLATE 5 MG/1
TABLET ORAL
Qty: 90 TABLET | Refills: 3 | Status: SHIPPED | OUTPATIENT
Start: 2018-08-08 | End: 2019-09-03 | Stop reason: SDUPTHER

## 2018-08-08 RX ORDER — LISINOPRIL 40 MG/1
TABLET ORAL
Qty: 90 TABLET | Refills: 3 | Status: SHIPPED | OUTPATIENT
Start: 2018-08-08 | End: 2019-04-16

## 2018-09-28 RX ORDER — ALPRAZOLAM 0.5 MG/1
TABLET ORAL
Qty: 20 TABLET | Refills: 0 | Status: SHIPPED | OUTPATIENT
Start: 2018-09-28 | End: 2018-11-30 | Stop reason: SDUPTHER

## 2018-11-06 ENCOUNTER — LAB VISIT (OUTPATIENT)
Dept: LAB | Facility: HOSPITAL | Age: 73
End: 2018-11-06
Attending: INTERNAL MEDICINE
Payer: MEDICARE

## 2018-11-06 DIAGNOSIS — I10 ESSENTIAL HYPERTENSION, BENIGN: ICD-10-CM

## 2018-11-06 LAB
ALBUMIN SERPL BCP-MCNC: 3.5 G/DL
ALP SERPL-CCNC: 79 U/L
ALT SERPL W/O P-5'-P-CCNC: 12 U/L
ANION GAP SERPL CALC-SCNC: 8 MMOL/L
AST SERPL-CCNC: 23 U/L
BASOPHILS # BLD AUTO: 0.03 K/UL
BASOPHILS NFR BLD: 0.6 %
BILIRUB SERPL-MCNC: 0.5 MG/DL
BUN SERPL-MCNC: 11 MG/DL
CALCIUM SERPL-MCNC: 9.7 MG/DL
CHLORIDE SERPL-SCNC: 105 MMOL/L
CHOLEST SERPL-MCNC: 159 MG/DL
CHOLEST/HDLC SERPL: 2.8 {RATIO}
CO2 SERPL-SCNC: 26 MMOL/L
CREAT SERPL-MCNC: 0.9 MG/DL
DIFFERENTIAL METHOD: NORMAL
EOSINOPHIL # BLD AUTO: 0.2 K/UL
EOSINOPHIL NFR BLD: 4.4 %
ERYTHROCYTE [DISTWIDTH] IN BLOOD BY AUTOMATED COUNT: 12.8 %
EST. GFR  (AFRICAN AMERICAN): >60 ML/MIN/1.73 M^2
EST. GFR  (NON AFRICAN AMERICAN): >60 ML/MIN/1.73 M^2
GLUCOSE SERPL-MCNC: 103 MG/DL
HCT VFR BLD AUTO: 39.9 %
HDLC SERPL-MCNC: 56 MG/DL
HDLC SERPL: 35.2 %
HGB BLD-MCNC: 12.8 G/DL
IMM GRANULOCYTES # BLD AUTO: 0.02 K/UL
IMM GRANULOCYTES NFR BLD AUTO: 0.4 %
LDLC SERPL CALC-MCNC: 75.8 MG/DL
LYMPHOCYTES # BLD AUTO: 1.5 K/UL
LYMPHOCYTES NFR BLD: 28.9 %
MCH RBC QN AUTO: 30.5 PG
MCHC RBC AUTO-ENTMCNC: 32.1 G/DL
MCV RBC AUTO: 95 FL
MONOCYTES # BLD AUTO: 0.4 K/UL
MONOCYTES NFR BLD: 7.9 %
NEUTROPHILS # BLD AUTO: 3 K/UL
NEUTROPHILS NFR BLD: 57.8 %
NONHDLC SERPL-MCNC: 103 MG/DL
NRBC BLD-RTO: 0 /100 WBC
PLATELET # BLD AUTO: 206 K/UL
PMV BLD AUTO: 12.1 FL
POTASSIUM SERPL-SCNC: 4 MMOL/L
PROT SERPL-MCNC: 6.6 G/DL
RBC # BLD AUTO: 4.2 M/UL
SODIUM SERPL-SCNC: 139 MMOL/L
TRIGL SERPL-MCNC: 136 MG/DL
TSH SERPL DL<=0.005 MIU/L-ACNC: 2.83 UIU/ML
WBC # BLD AUTO: 5.22 K/UL

## 2018-11-06 PROCEDURE — 80061 LIPID PANEL: CPT

## 2018-11-06 PROCEDURE — 80053 COMPREHEN METABOLIC PANEL: CPT

## 2018-11-06 PROCEDURE — 36415 COLL VENOUS BLD VENIPUNCTURE: CPT | Mod: PO

## 2018-11-06 PROCEDURE — 85025 COMPLETE CBC W/AUTO DIFF WBC: CPT

## 2018-11-06 PROCEDURE — 84443 ASSAY THYROID STIM HORMONE: CPT

## 2018-11-13 ENCOUNTER — TELEPHONE (OUTPATIENT)
Dept: INTERNAL MEDICINE | Facility: CLINIC | Age: 73
End: 2018-11-13

## 2018-11-13 ENCOUNTER — LAB VISIT (OUTPATIENT)
Dept: LAB | Facility: HOSPITAL | Age: 73
End: 2018-11-13
Attending: INTERNAL MEDICINE
Payer: MEDICARE

## 2018-11-13 ENCOUNTER — OFFICE VISIT (OUTPATIENT)
Dept: INTERNAL MEDICINE | Facility: CLINIC | Age: 73
End: 2018-11-13
Payer: MEDICARE

## 2018-11-13 VITALS
BODY MASS INDEX: 24.21 KG/M2 | RESPIRATION RATE: 12 BRPM | DIASTOLIC BLOOD PRESSURE: 60 MMHG | TEMPERATURE: 98 F | HEART RATE: 53 BPM | HEIGHT: 65 IN | SYSTOLIC BLOOD PRESSURE: 120 MMHG | WEIGHT: 145.31 LBS

## 2018-11-13 DIAGNOSIS — N39.0 URINARY TRACT INFECTION WITHOUT HEMATURIA, SITE UNSPECIFIED: ICD-10-CM

## 2018-11-13 DIAGNOSIS — I10 ESSENTIAL HYPERTENSION: ICD-10-CM

## 2018-11-13 DIAGNOSIS — E78.00 HYPERCHOLESTEREMIA: ICD-10-CM

## 2018-11-13 DIAGNOSIS — Z00.00 ENCOUNTER FOR PREVENTIVE HEALTH EXAMINATION: Primary | ICD-10-CM

## 2018-11-13 PROBLEM — N18.30 STAGE 3 CHRONIC KIDNEY DISEASE: Status: RESOLVED | Noted: 2018-03-07 | Resolved: 2018-11-13

## 2018-11-13 PROCEDURE — 99397 PER PM REEVAL EST PAT 65+ YR: CPT | Mod: S$GLB,,, | Performed by: INTERNAL MEDICINE

## 2018-11-13 PROCEDURE — 99999 PR PBB SHADOW E&M-EST. PATIENT-LVL V: CPT | Mod: PBBFAC,,, | Performed by: INTERNAL MEDICINE

## 2018-11-13 PROCEDURE — 87086 URINE CULTURE/COLONY COUNT: CPT

## 2018-11-13 PROCEDURE — 3074F SYST BP LT 130 MM HG: CPT | Mod: CPTII,S$GLB,, | Performed by: INTERNAL MEDICINE

## 2018-11-13 PROCEDURE — 3078F DIAST BP <80 MM HG: CPT | Mod: CPTII,S$GLB,, | Performed by: INTERNAL MEDICINE

## 2018-11-13 RX ORDER — SULFAMETHOXAZOLE AND TRIMETHOPRIM 800; 160 MG/1; MG/1
1 TABLET ORAL 2 TIMES DAILY
Qty: 6 TABLET | Refills: 0 | Status: SHIPPED | OUTPATIENT
Start: 2018-11-13 | End: 2018-12-06 | Stop reason: ALTCHOICE

## 2018-11-13 RX ORDER — FLUCONAZOLE 150 MG/1
150 TABLET ORAL DAILY
Qty: 1 TABLET | Refills: 0 | Status: SHIPPED | OUTPATIENT
Start: 2018-11-13 | End: 2018-11-14

## 2018-11-13 RX ORDER — MONTELUKAST SODIUM 10 MG/1
10 TABLET ORAL NIGHTLY
COMMUNITY

## 2018-11-13 NOTE — PROGRESS NOTES
History of present illness:  73-year-old lady in today for general health assessment.    Current medications:  All medications are noted and reviewed in the electronic medical record medication list.    Review of systems:  General: no fever, chills, generalized body aches. No unexpected weight loss.  Eyes:  No visual disturbances.  HEENT:  No hoarseness, dysphagia, ear pain.  Respiratory:  No cough, no shortness of breath.  Cardiovascular: no chest pain, palpitations, cough, exertional limb pain. No edema.  GI: no nausea, vomiting.  No abdominal pain. No change in bowel habits.  No melena, no hematochezia.  : no dysuria. No change in the color or character of the urine. No urinary frequency.  Musculoskeletal: no joint pain or swelling.  Neurologic:  No focal neurological complaints.  No headaches.  Skin:  No rashes or other concerns.  Psych:  She is having some grief recently as a consequence of her dogs recent death.  She seems to be handling this quite reasonably.    Past medical history:  Hypertension  Dyslipidemia  CAD by evidence of elevated Agatston coronary calcium score.  Previous cardiomyopathy.  Osteopenia.    Past surgical history, family medical history social history is are all noted reviewed the electronic medical record history sections.    Health screenings:  Colonoscopy is up-to-date with her outside gastroenterologist Dr. Ring.  Mammogram March 2018.  Eye exam April 2018.  She has had both pneumococcal vaccines.  Bone densitometry in September 2017.    Physical examination:  GENERAL:  Alert, appropriately groomed, no acute distress.  VS:  Blood pressure taken manually by this examiner is 106/60.  The  EYES: sclerae white ,nonicteric. PERRL.  HEENT:  Normocephalic. Ear canals and tympanic membranes normal. Mouth and pharynx normal. No thyromegaly. Trachea midline and freely mobile.  LUNGS:  Clear to ascultation and normal to percussion.  CARDIOVASCULAR:  Normal heart sounds.  No significant  murmur. Carotids full bilaterally without bruit.  Pedal pulses intact .  No abdominal bruit.  No peripheral extremity edema.  GI: the abdomen is soft, no distension. No masses , tenderness, organomegaly.    LYMPHATIC:  No axillary, inguinal , cervical adenopathy.  MUSCULOSKELETAL:  Range of motion, stability and strength of the right and left upper and lower extremities normal. No swollen or tender joints  NEUROLOGIC:  DTR's normal. No gross motor or sensory deficits apparent, gait normal.  SKIN:  No rashes.   MS:  Alert, oriented , affect and mood all appropriate    Data:  Laboratory data noted reviewed from November 6, 2018.  All reasonable other than infected appearing urine.    Impression:  Generally healthy 73-year-old lady with a couple of chronic medical issues.  Hypertension is well controlled.  Dyslipidemia on statin therapy.  CAD as evidenced by elevated Agatston coronary calcium score, followed by Cardiology.  Osteopenia which meets criteria for pharmacologic therapy.  She did not tolerate alendronate.  UTI.    Plan:  Urine is submitted for culture and sensitivity.  Empiric treatment Bactrim DS 1 p.o. b.i.d. 3 days.  She has had her influenza vaccine already this season.  Discussed other pharmacologic options for treatment of her osteopenia.  She is inclined to deferred at this time, we will repeat her bone densitometry next year and it shows continued deterioration she is agreeable to resume other therapy.  We did discuss being certain that she is taking appropriate calcium and vitamin-D supplementation.  She has an appointment Cardiology in February 2019, she was see me back in 1 year for annual health assessment.

## 2018-11-14 ENCOUNTER — TELEPHONE (OUTPATIENT)
Dept: INTERNAL MEDICINE | Facility: CLINIC | Age: 73
End: 2018-11-14

## 2018-11-14 LAB — BACTERIA UR CULT: NORMAL

## 2018-11-14 NOTE — TELEPHONE ENCOUNTER
Do not think this is the bactrim causing vag itch.  It takes several days for diflucan to have it's effect,would not expect immediate relief

## 2018-11-14 NOTE — TELEPHONE ENCOUNTER
----- Message from Kaylee Bower sent at 11/14/2018 11:44 AM CST -----  Contact: Pt Mobile [phone]  Patient would like to know if you're going to prescribe another antibiotic for her having a urinary tract infection? She said that the medication for bactrim gave her a allergic reaction and she need something else to help her with her symptoms.

## 2018-11-14 NOTE — TELEPHONE ENCOUNTER
Spoke with pt who advises that she did get the Diflucan but notes that she still has some vaginal itching.    Pt has d/c'd the Bactrim DS as she feels that this has caused the vaginal itching.    Pt is wondering if you can change the antibiotics as she labels these symptoms as an allergic reaction, though she does not have a visible rash.    Please advise.    Thanks.

## 2018-11-15 ENCOUNTER — TELEPHONE (OUTPATIENT)
Dept: INTERNAL MEDICINE | Facility: CLINIC | Age: 73
End: 2018-11-15

## 2018-11-15 NOTE — TELEPHONE ENCOUNTER
----- Message from Josette Alvarado sent at 11/15/2018  8:53 AM CST -----  Contact: Nesha Syeda [phone]  Nesha is requesting a call back in regards to the vaginal itching she's been experiencing. Nesha would like to know if theres any over the counter medication she can try for the itching or any medication Dr. Angeles can give her.

## 2018-11-28 RX ORDER — ROSUVASTATIN CALCIUM 40 MG/1
TABLET, COATED ORAL
Qty: 90 TABLET | Refills: 0 | Status: SHIPPED | OUTPATIENT
Start: 2018-11-28 | End: 2018-12-06 | Stop reason: SDUPTHER

## 2018-11-30 RX ORDER — ALPRAZOLAM 0.5 MG/1
0.5 TABLET ORAL DAILY PRN
Qty: 20 TABLET | Refills: 0 | Status: SHIPPED | OUTPATIENT
Start: 2018-11-30 | End: 2019-01-22 | Stop reason: SDUPTHER

## 2018-11-30 NOTE — TELEPHONE ENCOUNTER
----- Message from Kaylee S Sathish sent at 11/30/2018  3:54 PM CST -----  Contact: Pt Home [phone]  RX request - refill or new RX.  Is this a refill or new RX:  Refill  RX name and strength: ALPRAZolam (XANAX) 0.5 MG tablet  Directions:   Is this a 30 day or 90 day RX:    Local pharmacy or mail order pharmacy:  [facility] [address] [place] [address] [place]  AT [facility] [place]  Pharmacy name and phone # [facility] Phone#[phone], Fax#[phone]  Comments:  Patient said she usually get twenty pills. Patient said she's been trying to have her medication filled since last week.

## 2018-12-06 ENCOUNTER — OFFICE VISIT (OUTPATIENT)
Dept: INTERNAL MEDICINE | Facility: CLINIC | Age: 73
End: 2018-12-06
Payer: MEDICARE

## 2018-12-06 ENCOUNTER — TELEPHONE (OUTPATIENT)
Dept: INTERNAL MEDICINE | Facility: CLINIC | Age: 73
End: 2018-12-06

## 2018-12-06 ENCOUNTER — HOSPITAL ENCOUNTER (OUTPATIENT)
Dept: RADIOLOGY | Facility: HOSPITAL | Age: 73
Discharge: HOME | End: 2018-12-06
Attending: INTERNAL MEDICINE
Payer: MEDICARE

## 2018-12-06 VITALS
SYSTOLIC BLOOD PRESSURE: 128 MMHG | TEMPERATURE: 98 F | HEART RATE: 62 BPM | WEIGHT: 142.19 LBS | DIASTOLIC BLOOD PRESSURE: 72 MMHG | BODY MASS INDEX: 23.69 KG/M2 | HEIGHT: 65 IN | RESPIRATION RATE: 18 BRPM

## 2018-12-06 DIAGNOSIS — R10.9 ABDOMINAL PAIN, UNSPECIFIED ABDOMINAL LOCATION: ICD-10-CM

## 2018-12-06 DIAGNOSIS — R11.0 NAUSEA: Primary | ICD-10-CM

## 2018-12-06 PROCEDURE — 99213 OFFICE O/P EST LOW 20 MIN: CPT | Mod: HCNC,S$GLB,, | Performed by: INTERNAL MEDICINE

## 2018-12-06 PROCEDURE — 99999 PR PBB SHADOW E&M-EST. PATIENT-LVL III: CPT | Mod: PBBFAC,HCNC,, | Performed by: INTERNAL MEDICINE

## 2018-12-06 PROCEDURE — 1101F PT FALLS ASSESS-DOCD LE1/YR: CPT | Mod: CPTII,HCNC,S$GLB, | Performed by: INTERNAL MEDICINE

## 2018-12-06 PROCEDURE — 74019 RADEX ABDOMEN 2 VIEWS: CPT | Mod: TC,HCNC,PO

## 2018-12-06 PROCEDURE — 74019 RADEX ABDOMEN 2 VIEWS: CPT | Mod: 26,HCNC,, | Performed by: RADIOLOGY

## 2018-12-06 PROCEDURE — 3078F DIAST BP <80 MM HG: CPT | Mod: CPTII,HCNC,S$GLB, | Performed by: INTERNAL MEDICINE

## 2018-12-06 PROCEDURE — 3074F SYST BP LT 130 MM HG: CPT | Mod: CPTII,HCNC,S$GLB, | Performed by: INTERNAL MEDICINE

## 2018-12-06 RX ORDER — PROMETHAZINE HYDROCHLORIDE 25 MG/1
25 TABLET ORAL EVERY 6 HOURS PRN
Qty: 20 TABLET | Refills: 1 | Status: SHIPPED | OUTPATIENT
Start: 2018-12-06 | End: 2019-03-20 | Stop reason: SDUPTHER

## 2018-12-06 NOTE — TELEPHONE ENCOUNTER
Called and spoke with the patient and she was advised of the results per Dr Hughes she understood and termed the call

## 2018-12-06 NOTE — TELEPHONE ENCOUNTER
Please inform patient that x-ray shows no acute abnormalities.  Recommend patient take Zantac at bedtime as discussed at visit.  Await urine studies.

## 2018-12-06 NOTE — PROGRESS NOTES
CC:  Nausea and gas  HPI:  The patient is a 73 y.o. year old female who presents to the office for nausea and gas.  Her symptoms started this week.  She has no appetite.  She reports diarrhea and vomiting 2 weeks ago.  She denies any new foods.  Cramping abdominal pain has improved.  Her last stool was this morning was soft.  She last took miralax yesterday.    PAST MEDICAL HISTORY:  Past Medical History:   Diagnosis Date    Coronary arteriosclerosis     HTN (hypertension)     Hypercholesteremia     LBBB (left bundle branch block)        SURGICAL HISTORY:  Past Surgical History:   Procedure Laterality Date    CATARACT EXTRACTION      10 years+ OU    HYSTERECTOMY      partial colectomy         MEDS:  Medcard reviewed and updated    ALLERGIES: Allergy Card reviewed and updated    SOCIAL HISTORY:   The patient is a nonsmoker.    PE:   APPEARANCE: Well nourished, well developed, in no acute distress.  CHEST: Lungs clear to auscultation with unlabored respirations.  CARDIOVASCULAR: Normal S1, S2. No murmurs. No carotid bruits. No pedal edema.  ABDOMEN: Bowel sounds normal. Not distended. Soft. No tenderness or masses.  PSYCHIATRIC: The patient is oriented to person, place, and time and has a pleasant affect.        ASSESSMENT/PLAN:  Nesha was seen today for nausea and upset stomach.    Diagnoses and all orders for this visit:    Nausea  -     Urinalysis; Future  -     Urine culture; Future  -     prescribed promethazine    Abdominal pain, unspecified abdominal location  -     Urinalysis; Future  -     Urine culture; Future  -     X-Ray Abdomen Flat And Erect; Future  -     add Zantac at bedtime    Other orders  -     promethazine (PHENERGAN) 25 MG tablet; Take 1 tablet (25 mg total) by mouth every 6 (six) hours as needed for Nausea.  -     ranitidine (ZANTAC) 150 MG capsule; Take 1 capsule (150 mg total) by mouth nightly.

## 2019-01-23 RX ORDER — ALPRAZOLAM 0.5 MG/1
TABLET ORAL
Qty: 20 TABLET | Refills: 0 | Status: SHIPPED | OUTPATIENT
Start: 2019-01-23 | End: 2019-04-01 | Stop reason: SDUPTHER

## 2019-01-25 ENCOUNTER — PES CALL (OUTPATIENT)
Dept: ADMINISTRATIVE | Facility: CLINIC | Age: 74
End: 2019-01-25

## 2019-01-28 ENCOUNTER — OFFICE VISIT (OUTPATIENT)
Dept: OBSTETRICS AND GYNECOLOGY | Facility: CLINIC | Age: 74
End: 2019-01-28
Attending: OBSTETRICS & GYNECOLOGY
Payer: MEDICARE

## 2019-01-28 VITALS — DIASTOLIC BLOOD PRESSURE: 72 MMHG | SYSTOLIC BLOOD PRESSURE: 116 MMHG | WEIGHT: 144.81 LBS | BODY MASS INDEX: 24.1 KG/M2

## 2019-01-28 DIAGNOSIS — Z01.419 WELL WOMAN EXAM WITH ROUTINE GYNECOLOGICAL EXAM: Primary | ICD-10-CM

## 2019-01-28 DIAGNOSIS — Z78.0 POSTMENOPAUSAL STATUS: ICD-10-CM

## 2019-01-28 DIAGNOSIS — Z12.31 VISIT FOR SCREENING MAMMOGRAM: ICD-10-CM

## 2019-01-28 DIAGNOSIS — N76.0 ACUTE VAGINITIS: ICD-10-CM

## 2019-01-28 DIAGNOSIS — Z90.710 HISTORY OF HYSTERECTOMY: ICD-10-CM

## 2019-01-28 LAB
CANDIDA RRNA VAG QL PROBE: NEGATIVE
G VAGINALIS RRNA GENITAL QL PROBE: NEGATIVE
T VAGINALIS RRNA GENITAL QL PROBE: NEGATIVE

## 2019-01-28 PROCEDURE — 99999 PR PBB SHADOW E&M-EST. PATIENT-LVL III: ICD-10-PCS | Mod: PBBFAC,HCNC,, | Performed by: OBSTETRICS & GYNECOLOGY

## 2019-01-28 PROCEDURE — G0101 CA SCREEN;PELVIC/BREAST EXAM: HCPCS | Mod: HCNC,S$GLB,, | Performed by: OBSTETRICS & GYNECOLOGY

## 2019-01-28 PROCEDURE — 87480 CANDIDA DNA DIR PROBE: CPT | Mod: HCNC

## 2019-01-28 PROCEDURE — 87510 GARDNER VAG DNA DIR PROBE: CPT | Mod: HCNC

## 2019-01-28 PROCEDURE — G0101 PR CA SCREEN;PELVIC/BREAST EXAM: ICD-10-PCS | Mod: HCNC,S$GLB,, | Performed by: OBSTETRICS & GYNECOLOGY

## 2019-01-28 PROCEDURE — 99999 PR PBB SHADOW E&M-EST. PATIENT-LVL III: CPT | Mod: PBBFAC,HCNC,, | Performed by: OBSTETRICS & GYNECOLOGY

## 2019-01-28 RX ORDER — ONDANSETRON 4 MG/1
4 TABLET, ORALLY DISINTEGRATING ORAL
COMMUNITY

## 2019-01-28 RX ORDER — DOXYCYCLINE HYCLATE 100 MG
TABLET ORAL
COMMUNITY
End: 2019-07-18

## 2019-01-28 RX ORDER — LEVOFLOXACIN 750 MG/1
TABLET ORAL
COMMUNITY
End: 2019-04-16

## 2019-01-28 RX ORDER — CEPHALEXIN 500 MG/1
CAPSULE ORAL
Refills: 0 | COMMUNITY
Start: 2018-12-07 | End: 2019-04-16

## 2019-01-28 NOTE — PROGRESS NOTES
Nesha Landa is a 73 y.o. year old  who presents for annual exam.  She is S/P DAO for endometriosis many years ago.  Ovaries were not removed.  Denies bleeding, flashes, and sweats.  Denies having abnormal paps in the past.  Reports having a vaginal discharge with mild itching and odor for the past month.  Recently, her symptoms have become less prominent, but not completely resolved.  Previously, seeing Dr. Salter.    Mammogram 18: Negative    Past Medical History:   Diagnosis Date    Coronary arteriosclerosis     HTN (hypertension)     Hypercholesteremia     LBBB (left bundle branch block)        Past Surgical History:   Procedure Laterality Date    CATARACT EXTRACTION      10 years+ OU    HYSTERECTOMY      partial colectomy         OB History      Para Term  AB Living    10 5 5     5    SAB TAB Ectopic Multiple Live Births            5          ROS:  GENERAL: Feeling well overall.   SKIN: Denies rash or lesions.   HEAD: Denies head injury or headache.   NODES: Denies enlarged lymph nodes.   CHEST: Denies chest pain or shortness of breath.   CARDIOVASCULAR: Denies palpitations or left sided chest pain.   ABDOMEN: No abdominal pain, nausea, vomiting or rectal bleeding.   URINARY: No dysuria or hematuria.  REPRODUCTIVE: See HPI.   BREASTS: Denies pain, lumps, or nipple discharge.   HEMATOLOGIC: No easy bruisability or excessive bleeding.   MUSCULOSKELETAL: Denies joint pain or swelling.   NEUROLOGIC: Denies syncope or weakness.   PSYCHIATRIC: Denies depression.    PE:  (chaperone present during entire exam)  APPEARANCE: Well nourished, well developed, in no acute distress.  NODES: No inguinal lymph node enlargement.  ABDOMEN: Soft. No tenderness or masses. No hernias.  BREASTS: Symmetrical, no skin changes or visible lesions. No palpable masses, nipple discharge or adenopathy bilaterally.  PELVIC: Atrophic external female genitalia without lesions. Normal hair distribution.  Adequate perineal body, normal urethral meatus. Vagina atrophic without lesions, minimal discharge. No significant cystocele or rectocele. Uterus and cervix surgically absent. Bimanual exam revealed no masses, tenderness or abnormality.  ANUS: Normal.    Diagnosis:  1. Well woman exam with routine gynecological exam    2. Postmenopausal status    3. History of hysterectomy    4. Acute vaginitis    5. Visit for screening mammogram          PLAN:    Orders Placed This Encounter    VAGINOSIS SCREEN BY DNA PROBE    Mammo Digital Screening [facility]/ [facility]       Patient was counseled today on postmenopausal issues.  We also discussed vaginitis: etiologies, diagnosis, and treatment.  We will contact her in several days for results of the Affirm.      Follow-up in 1 year.

## 2019-01-29 ENCOUNTER — TELEPHONE (OUTPATIENT)
Dept: OBSTETRICS AND GYNECOLOGY | Facility: CLINIC | Age: 74
End: 2019-01-29

## 2019-01-29 NOTE — TELEPHONE ENCOUNTER
----- Message from Melba French MA sent at 1/29/2019  9:38 AM CST -----  Contact: khanh  Pt returning your call.  ----- Message -----  From: Renetta Ortega  Sent: 1/29/2019   8:51 AM  To: [facility] Staff    Name of Who is Calling: khanh      What is the request in detail: Patient was returning a missed call back from the staff      Can the clinic reply by [facility]: no      What Number to Call Back if not in [facility]: [phone]

## 2019-01-29 NOTE — TELEPHONE ENCOUNTER
Called patient:    Aware of negative Affirm.    Reports that her symptoms have essentially resolved.    Wants to have mammogram performed at [facility] [address].

## 2019-02-15 RX ORDER — CARVEDILOL 25 MG/1
25 TABLET ORAL 2 TIMES DAILY
Qty: 180 TABLET | Refills: 3 | Status: SHIPPED | OUTPATIENT
Start: 2019-02-15 | End: 2020-02-27

## 2019-03-20 RX ORDER — PROMETHAZINE HYDROCHLORIDE 25 MG/1
TABLET ORAL
Qty: 20 TABLET | Refills: 0 | Status: SHIPPED | OUTPATIENT
Start: 2019-03-20 | End: 2020-06-17

## 2019-04-01 RX ORDER — ALPRAZOLAM 0.5 MG/1
TABLET ORAL
Qty: 20 TABLET | Refills: 0 | Status: SHIPPED | OUTPATIENT
Start: 2019-04-01 | End: 2019-06-04 | Stop reason: SDUPTHER

## 2019-04-10 ENCOUNTER — LAB VISIT (OUTPATIENT)
Dept: LAB | Facility: HOSPITAL | Age: 74
End: 2019-04-10
Attending: INTERNAL MEDICINE
Payer: MEDICARE

## 2019-04-10 DIAGNOSIS — I10 ESSENTIAL HYPERTENSION: ICD-10-CM

## 2019-04-10 DIAGNOSIS — E78.00 HYPERCHOLESTEREMIA: ICD-10-CM

## 2019-04-10 LAB
ALT SERPL W/O P-5'-P-CCNC: 14 U/L (ref 10–44)
ANION GAP SERPL CALC-SCNC: 8 MMOL/L (ref 8–16)
AST SERPL-CCNC: 19 U/L (ref 10–40)
BUN SERPL-MCNC: 10 MG/DL (ref 8–23)
CALCIUM SERPL-MCNC: 10.2 MG/DL (ref 8.7–10.5)
CHLORIDE SERPL-SCNC: 100 MMOL/L (ref 95–110)
CHOLEST SERPL-MCNC: 173 MG/DL (ref 120–199)
CHOLEST/HDLC SERPL: 2.4 {RATIO} (ref 2–5)
CO2 SERPL-SCNC: 28 MMOL/L (ref 23–29)
CREAT SERPL-MCNC: 0.9 MG/DL (ref 0.5–1.4)
EST. GFR  (AFRICAN AMERICAN): >60 ML/MIN/1.73 M^2
EST. GFR  (NON AFRICAN AMERICAN): >60 ML/MIN/1.73 M^2
GLUCOSE SERPL-MCNC: 99 MG/DL (ref 70–110)
HDLC SERPL-MCNC: 71 MG/DL (ref 40–75)
HDLC SERPL: 41 % (ref 20–50)
LDLC SERPL CALC-MCNC: 77.6 MG/DL (ref 63–159)
NONHDLC SERPL-MCNC: 102 MG/DL
POTASSIUM SERPL-SCNC: 4.2 MMOL/L (ref 3.5–5.1)
SODIUM SERPL-SCNC: 136 MMOL/L (ref 136–145)
TRIGL SERPL-MCNC: 122 MG/DL (ref 30–150)

## 2019-04-10 PROCEDURE — 36415 COLL VENOUS BLD VENIPUNCTURE: CPT | Mod: HCNC,PO

## 2019-04-10 PROCEDURE — 84460 ALANINE AMINO (ALT) (SGPT): CPT | Mod: HCNC

## 2019-04-10 PROCEDURE — 80048 BASIC METABOLIC PNL TOTAL CA: CPT | Mod: HCNC

## 2019-04-10 PROCEDURE — 80061 LIPID PANEL: CPT | Mod: HCNC

## 2019-04-10 PROCEDURE — 84450 TRANSFERASE (AST) (SGOT): CPT | Mod: HCNC

## 2019-04-16 ENCOUNTER — OFFICE VISIT (OUTPATIENT)
Dept: CARDIOLOGY | Facility: CLINIC | Age: 74
End: 2019-04-16
Payer: MEDICARE

## 2019-04-16 VITALS
HEIGHT: 65 IN | WEIGHT: 138.88 LBS | HEART RATE: 57 BPM | DIASTOLIC BLOOD PRESSURE: 68 MMHG | SYSTOLIC BLOOD PRESSURE: 114 MMHG | BODY MASS INDEX: 23.14 KG/M2

## 2019-04-16 DIAGNOSIS — I42.9 CARDIOMYOPATHY, UNSPECIFIED TYPE: ICD-10-CM

## 2019-04-16 DIAGNOSIS — I10 ESSENTIAL HYPERTENSION: ICD-10-CM

## 2019-04-16 DIAGNOSIS — I25.84 CORONARY ARTERY DISEASE DUE TO CALCIFIED CORONARY LESION: Primary | ICD-10-CM

## 2019-04-16 DIAGNOSIS — I25.10 CORONARY ARTERY DISEASE DUE TO CALCIFIED CORONARY LESION: Primary | ICD-10-CM

## 2019-04-16 DIAGNOSIS — I44.7 LBBB (LEFT BUNDLE BRANCH BLOCK): ICD-10-CM

## 2019-04-16 DIAGNOSIS — E78.00 HYPERCHOLESTEREMIA: ICD-10-CM

## 2019-04-16 PROCEDURE — 99214 PR OFFICE/OUTPT VISIT, EST, LEVL IV, 30-39 MIN: ICD-10-PCS | Mod: HCNC,S$GLB,, | Performed by: INTERNAL MEDICINE

## 2019-04-16 PROCEDURE — 99214 OFFICE O/P EST MOD 30 MIN: CPT | Mod: HCNC,S$GLB,, | Performed by: INTERNAL MEDICINE

## 2019-04-16 PROCEDURE — 93000 EKG 12-LEAD: ICD-10-PCS | Mod: HCNC,S$GLB,, | Performed by: INTERNAL MEDICINE

## 2019-04-16 PROCEDURE — 1101F PR PT FALLS ASSESS DOC 0-1 FALLS W/OUT INJ PAST YR: ICD-10-PCS | Mod: HCNC,CPTII,S$GLB, | Performed by: INTERNAL MEDICINE

## 2019-04-16 PROCEDURE — 99999 PR PBB SHADOW E&M-EST. PATIENT-LVL III: CPT | Mod: PBBFAC,HCNC,, | Performed by: INTERNAL MEDICINE

## 2019-04-16 PROCEDURE — 3078F PR MOST RECENT DIASTOLIC BLOOD PRESSURE < 80 MM HG: ICD-10-PCS | Mod: HCNC,CPTII,S$GLB, | Performed by: INTERNAL MEDICINE

## 2019-04-16 PROCEDURE — 3078F DIAST BP <80 MM HG: CPT | Mod: HCNC,CPTII,S$GLB, | Performed by: INTERNAL MEDICINE

## 2019-04-16 PROCEDURE — 3074F SYST BP LT 130 MM HG: CPT | Mod: HCNC,CPTII,S$GLB, | Performed by: INTERNAL MEDICINE

## 2019-04-16 PROCEDURE — 1101F PT FALLS ASSESS-DOCD LE1/YR: CPT | Mod: HCNC,CPTII,S$GLB, | Performed by: INTERNAL MEDICINE

## 2019-04-16 PROCEDURE — 93000 ELECTROCARDIOGRAM COMPLETE: CPT | Mod: HCNC,S$GLB,, | Performed by: INTERNAL MEDICINE

## 2019-04-16 PROCEDURE — 99999 PR PBB SHADOW E&M-EST. PATIENT-LVL III: ICD-10-PCS | Mod: PBBFAC,HCNC,, | Performed by: INTERNAL MEDICINE

## 2019-04-16 PROCEDURE — 3074F PR MOST RECENT SYSTOLIC BLOOD PRESSURE < 130 MM HG: ICD-10-PCS | Mod: HCNC,CPTII,S$GLB, | Performed by: INTERNAL MEDICINE

## 2019-04-16 RX ORDER — VITAMIN E 268 MG
400 CAPSULE ORAL DAILY
COMMUNITY
Start: 2014-07-14

## 2019-04-16 RX ORDER — CANDESARTAN 32 MG/1
32 TABLET ORAL DAILY
Qty: 90 TABLET | Refills: 3 | Status: SHIPPED | OUTPATIENT
Start: 2019-04-16 | End: 2019-04-18

## 2019-04-16 NOTE — PATIENT INSTRUCTIONS
A recent study suggested that one of the medications that you take an ACE inhibitor lisinopril, is associated w a higher risk of lung cancer. Its too early. I do not want you to stop the medication, but over the next 1-2 months, as we process this data, I will let you know if you should switch to an angiotensin receptor blocker.   A large [place] based trial that compared ACE inhibitors (ACE-I) to angiotensin receptor blocker (ARBs) was published in 2018. The ACE-I  were associated with a higher incidence of lung cancer.   The incidence was higher when the medication was taken for more than 10 years.  We have no way of establishing causality, meaning whether there is a cause and effect relationship here or just an association based on chance.    Cardiologists are all divided over this. [facility]'s official recommendation is, not to discontinue the medication for patients who are already on an ACE-inhibitor, but to also refrain from writing any new prescriptions for ACE-inhibitors.  The Angiotensin receptor blocker (ARBs) have been recently plagued by contamination issues.  Small amounts of carcinogens have been accidentally introduced during the manufacturing process of 3 commonly used ARBs. The FDA was able to identify the contaminated medications and pharmacies were quickly able to notify patients and pull the affected batches.

## 2019-04-16 NOTE — PROGRESS NOTES
Subjective:   Patient ID:  Nesha Landa is a 73 y.o. female who presents for follow-up of Coronary Artery Disease      Problem List:  CACS >600   Hyperlipidemia   HTN  LBBB  Cardiomyopathy - resolved    HPI:   Nesha Landa feels generally well. She does not report angina or shortness of breath with exertion.     She had a nonischemic cardiomyopathy with a left bundle branch block which has resolved.  The left bundle branch block persists.  Echo in 8/17:  Normal LV size 4.0 cm, LV EF 55%, normal right ventricular size and function, estimated PA systolic pressure 23 mm plus RA pressure.  Ascending aorta measures 3.6 cm which is at the upper limits of normal for her body size.      Review of Systems   Constitution: Positive for weight loss. Negative for malaise/fatigue and weight gain.   Cardiovascular: Negative for chest pain, dyspnea on exertion, leg swelling and palpitations.   Respiratory: Negative for cough and hemoptysis.    Hematologic/Lymphatic: Does not bruise/bleed easily.   Musculoskeletal: Negative for arthritis and muscle cramps.   Gastrointestinal: Positive for change in bowel habit. Negative for abdominal pain, heartburn and melena.   Genitourinary: Positive for nocturia. Negative for hematuria.   Neurological: Positive for headaches. Negative for light-headedness and seizures.   Psychiatric/Behavioral: Negative for depression.       Current Outpatient Medications   Medication Sig    alendronate (FOSAMAX) 70 MG tablet Take 1 tablet (70 mg total) by mouth every 7 days.    ALPRAZolam (XANAX) 0.5 MG tablet TAKE 1 TABLET BY MOUTH EVERY DAY AS NEEDED    amLODIPine (NORVASC) 5 MG tablet TAKE 1 TABLET EVERY DAY    aspirin 81 MG Chew Take 81 mg by mouth once daily.    brompheniramine-pseudoeph-DM 2-30-10 mg/5 mL Syrp Take by mouth daily as needed.    butalbital-acetaminophen-caffeine -40 mg (FIORICET, ESGIC) -40 mg per tablet Take 1 tablet by mouth as needed.    calcium carbonate-vitamin D3  (CALCIUM 600 + D,3,) 600-125 mg-unit Tab Take 1 tablet by mouth once daily.    carvedilol (COREG) 25 MG tablet Take 1 tablet (25 mg total) by mouth 2 (two) times daily.    cholecalciferol, vitamin D3, 1,000 unit capsule Take 1 capsule by mouth Daily.      coenzyme Q10 200 mg capsule Take 200 mg by mouth once daily.    cyanocobalamin, vitamin B-12, (VITAMIN B-12) 50 mcg tablet Take 1 tablet by mouth Daily.      doxycycline (VIBRA-TABS) 100 MG tablet doxycycline hyclate 100 mg tablet    fish oil-omega-3 fatty acids 300-1,000 mg capsule Take 1 g by mouth once daily.    fluticasone (FLONASE) 50 mcg/actuation nasal spray 1 spray once daily.    folic acid (FOLVITE) 400 MCG tablet Take 400 mcg by mouth once daily.    LACTOBACILLUS ACIDOPHILUS (ACIDOPHILUS ORAL) Take 1 capsule by mouth once daily.     lisinopril (PRINIVIL,ZESTRIL) 40 MG tablet TAKE 1 TABLET EVERY DAY    montelukast (SINGULAIR) 10 mg tablet Take 10 mg by mouth every evening.    multivit with minerals/lutein (MULTIVITAMIN 50 PLUS ORAL) Take 1 tablet by mouth once daily.    multivitamin (ONE DAILY MULTIVITAMIN) per tablet Take 1 tablet by mouth once daily.    omeprazole (PRILOSEC) 20 MG capsule Take 1 capsule by mouth Every PM.      ondansetron (ZOFRAN-ODT) 4 MG TbDL ondansetron 4 mg disintegrating tablet    polyethylene glycol (MIRALAX) 17 gram/dose powder Take by mouth Daily. Pt taking 1 capful prn.    promethazine (PHENERGAN) 25 MG tablet TAKE 1 TABLET(25 MG) BY MOUTH EVERY 6 HOURS AS NEEDED FOR NAUSEA    ranitidine (ZANTAC) 150 MG capsule Take 1 capsule (150 mg total) by mouth nightly. (Patient taking differently: Take 150 mg by mouth nightly. Pt taking prn.)    rosuvastatin (CRESTOR) 40 MG Tab TAKE 1 TABLET BY MOUTH EVERY NIGHT AT BEDTIME (Patient taking differently: Pt taking 1/2 tablet once a day.)    SIMETHICONE (GAS-X ORAL) Take 1 tablet by mouth as needed.    vitamin E 400 UNIT capsule Take 400 mg by mouth once daily.  "        Social History     Tobacco Use    Smoking status: Never Smoker    Smokeless tobacco: Never Used   Substance Use Topics    Alcohol use: No    Drug use: No         Objective:     Physical Exam   Constitutional: She is oriented to person, place, and time. She appears well-developed and well-nourished.   /68   Pulse (!) 57   Ht 5' 5" (1.651 m)   Wt 63 kg (138 lb 14.2 oz)   BMI 23.11 kg/m²      HENT:   Head: Normocephalic.   Neck: No JVD present. Carotid bruit is not present.   Cardiovascular: Normal rate, regular rhythm, S1 normal and S2 normal. Exam reveals no gallop.   No murmur heard.  Pulses:       Radial pulses are 2+ on the right side, and 2+ on the left side.        Posterior tibial pulses are 2+ on the right side, and 2+ on the left side.   Pulmonary/Chest: Effort normal. She has no wheezes. She has no rales. Chest wall is not dull to percussion.   Abdominal: Soft. She exhibits no abdominal bruit. There is no splenomegaly or hepatomegaly. There is no tenderness.   Musculoskeletal:        Right lower leg: She exhibits no edema.        Left lower leg: She exhibits no edema.   Neurological: She is alert and oriented to person, place, and time. Gait normal.   Skin: Skin is warm and dry. No bruising and no rash noted. No cyanosis. Nails show no clubbing.   Psychiatric: She has a normal mood and affect. Her speech is normal and behavior is normal. Judgment normal. Cognition and memory are normal.           Lab Results   Component Value Date    CHOL 173 04/10/2019    HDL 71 04/10/2019    LDLCALC 77.6 04/10/2019    TRIG 122 04/10/2019    CHOLHDL 41.0 04/10/2019     Lab Results   Component Value Date    GLU 99 04/10/2019    CREATININE 0.9 04/10/2019    BUN 10 04/10/2019     04/10/2019    K 4.2 04/10/2019     04/10/2019    CO2 28 04/10/2019     Lab Results   Component Value Date    ALT 14 04/10/2019    AST 19 04/10/2019    ALKPHOS 79 11/06/2018    BILITOT 0.5 11/06/2018       ECG today " reviewed by me. It reveals sinus rhythm with a LBBB.      Assessment and Plan:     Coronary artery disease due to calcified coronary lesion    Cardiomyopathy - resolved  She should continue a beta blocker and switch lisinopril to an ARB.    LBBB (left bundle branch block)  -     EKG 12-lead    Essential hypertension  Switch lisinopril to an ARB.  I first suggested candesartan 32 mg a day.  She initially called to report that candesartan was expensive. I then recommended valsartan 320 mg a day.  -     Basic metabolic panel; Future; Expected date: 04/25/2020    Hypercholesteremia  -     Lipid panel; Future; Expected date: 04/25/2020  -     ALT (SGPT); Future; Expected date: 04/25/2020  -     AST (SGOT); Future; Expected date: 04/25/2020         Follow up in about 1 year (around 4/16/2020).

## 2019-04-17 ENCOUNTER — HOSPITAL ENCOUNTER (OUTPATIENT)
Dept: RADIOLOGY | Facility: HOSPITAL | Age: 74
Discharge: HOME | End: 2019-04-17
Attending: OBSTETRICS & GYNECOLOGY
Payer: MEDICARE

## 2019-04-17 DIAGNOSIS — Z12.31 VISIT FOR SCREENING MAMMOGRAM: ICD-10-CM

## 2019-04-17 PROCEDURE — 77067 MAMMO DIGITAL SCREENING BILAT WITH TOMOSYNTHESIS_CAD: ICD-10-PCS | Mod: 26,HCNC,, | Performed by: RADIOLOGY

## 2019-04-17 PROCEDURE — 77067 SCR MAMMO BI INCL CAD: CPT | Mod: 26,HCNC,, | Performed by: RADIOLOGY

## 2019-04-17 PROCEDURE — 77063 BREAST TOMOSYNTHESIS BI: CPT | Mod: 26,HCNC,, | Performed by: RADIOLOGY

## 2019-04-17 PROCEDURE — 77067 SCR MAMMO BI INCL CAD: CPT | Mod: TC,HCNC,PO

## 2019-04-17 PROCEDURE — 77063 MAMMO DIGITAL SCREENING BILAT WITH TOMOSYNTHESIS_CAD: ICD-10-PCS | Mod: 26,HCNC,, | Performed by: RADIOLOGY

## 2019-04-18 ENCOUNTER — TELEPHONE (OUTPATIENT)
Dept: CARDIOLOGY | Facility: CLINIC | Age: 74
End: 2019-04-18

## 2019-04-18 RX ORDER — VALSARTAN 320 MG/1
320 TABLET ORAL DAILY
Qty: 90 TABLET | Refills: 3 | Status: SHIPPED | OUTPATIENT
Start: 2019-04-18 | End: 2020-03-30

## 2019-04-18 RX ORDER — VALSARTAN 320 MG/1
320 TABLET ORAL DAILY
COMMUNITY
End: 2019-04-18 | Stop reason: SDUPTHER

## 2019-04-18 NOTE — TELEPHONE ENCOUNTER
Pt called stating that Candesartan will cost her over $100 and she can't afford it and would like to know if she can get another medication. Per Dr. Warren he will switch pt to Valsartan 320mg and that it should cost the same as Lisinopril. Pt notified, verbalized understanding.

## 2019-04-18 NOTE — TELEPHONE ENCOUNTER
----- Message from Josey Richter sent at 4/18/2019  2:24 PM CDT -----  Contact: Patient  The Pt is calling regarding her medication and she don't remember the name. Please call her back @ [phone]. Thanks, Josey

## 2019-04-22 ENCOUNTER — TELEPHONE (OUTPATIENT)
Dept: CARDIOLOGY | Facility: CLINIC | Age: 74
End: 2019-04-22

## 2019-04-22 NOTE — TELEPHONE ENCOUNTER
----- Message from Angle Chew sent at 4/22/2019  8:34 AM CDT -----  Contact: Self  .Needs Advice    Reason for call:  Pt was switch from Lisinopril to new prescription that will cost $100 co-pay for each of the new meds prescribed. Pt can't afford. Please call. Thanks        Communication Preference: [phone]    Additional Information:

## 2019-04-22 NOTE — TELEPHONE ENCOUNTER
Spoke with Ms. Landa who reports that both the coreg and valsartan have $100+ copays.  Pt states she is unable to afford these medications.  Please advise.

## 2019-04-26 RX ORDER — ROSUVASTATIN CALCIUM 40 MG/1
TABLET, COATED ORAL
Qty: 90 TABLET | Refills: 0 | Status: SHIPPED | OUTPATIENT
Start: 2019-04-26 | End: 2019-10-05 | Stop reason: SDUPTHER

## 2019-05-01 ENCOUNTER — HOSPITAL ENCOUNTER (OUTPATIENT)
Dept: RADIOLOGY | Facility: HOSPITAL | Age: 74
Discharge: HOME | End: 2019-05-01
Attending: INTERNAL MEDICINE
Payer: MEDICARE

## 2019-05-20 ENCOUNTER — OFFICE VISIT (OUTPATIENT)
Dept: OPTOMETRY | Facility: CLINIC | Age: 74
End: 2019-05-20
Payer: COMMERCIAL

## 2019-05-20 DIAGNOSIS — H10.13 ALLERGIC CONJUNCTIVITIS, BILATERAL: ICD-10-CM

## 2019-05-20 DIAGNOSIS — I10 ESSENTIAL HYPERTENSION: Primary | ICD-10-CM

## 2019-05-20 DIAGNOSIS — H52.7 REFRACTIVE ERROR: ICD-10-CM

## 2019-05-20 DIAGNOSIS — Z96.1 PSEUDOPHAKIA OF BOTH EYES: ICD-10-CM

## 2019-05-20 PROCEDURE — 92014 PR EYE EXAM, EST PATIENT,COMPREHESV: ICD-10-PCS | Mod: S$GLB,,, | Performed by: OPTOMETRIST

## 2019-05-20 PROCEDURE — 99999 PR PBB SHADOW E&M-EST. PATIENT-LVL II: ICD-10-PCS | Mod: PBBFAC,,, | Performed by: OPTOMETRIST

## 2019-05-20 PROCEDURE — 99499 UNLISTED E&M SERVICE: CPT | Mod: S$GLB,,, | Performed by: OPTOMETRIST

## 2019-05-20 PROCEDURE — 99499 RISK ADDL DX/OHS AUDIT: ICD-10-PCS | Mod: S$GLB,,, | Performed by: OPTOMETRIST

## 2019-05-20 PROCEDURE — 99999 PR PBB SHADOW E&M-EST. PATIENT-LVL II: CPT | Mod: PBBFAC,,, | Performed by: OPTOMETRIST

## 2019-05-20 PROCEDURE — 92014 COMPRE OPH EXAM EST PT 1/>: CPT | Mod: S$GLB,,, | Performed by: OPTOMETRIST

## 2019-05-20 NOTE — PROGRESS NOTES
HPI     ADI 04/2018 Glasses about 2 yrs. Old, still seem fine.  Eyes water   sometimes OS worse than OD.  Not using any AT's.   Patient has seasonal   allergies and uses Zaditor prn with relief of itch and tearing    Last edited by Jerzy Messina, OD on 5/20/2019  3:21 PM. (History)        ROS     Positive for: Cardiovascular    Last edited by Jerzy Messina, OD on 5/20/2019  3:21 PM. (History)        Assessment /Plan     For exam results, see Encounter Report.    Essential hypertension    Allergic conjunctivitis, bilateral    Pseudophakia of both eyes    Refractive error      1. No HTN ret.  2. Recommended continue OTC Zaditor bid OU for itching  3. Monitor condition. Patient to report any changes. RTC 1 year recheck.  4. Cont with spec rx.

## 2019-06-04 RX ORDER — ALPRAZOLAM 0.5 MG/1
TABLET ORAL
Qty: 20 TABLET | Refills: 0 | Status: SHIPPED | OUTPATIENT
Start: 2019-06-04 | End: 2019-07-23 | Stop reason: SDUPTHER

## 2019-07-16 ENCOUNTER — TELEPHONE (OUTPATIENT)
Dept: INTERNAL MEDICINE | Facility: CLINIC | Age: 74
End: 2019-07-16

## 2019-07-16 DIAGNOSIS — I10 ESSENTIAL HYPERTENSION: Primary | ICD-10-CM

## 2019-07-16 DIAGNOSIS — E78.00 HYPERCHOLESTEREMIA: ICD-10-CM

## 2019-07-16 NOTE — TELEPHONE ENCOUNTER
----- Message from Cari Coates sent at 7/15/2019  2:28 PM CDT -----  Contact: self   Doctor appointment and lab have been scheduled.  Please link lab orders to the lab appointment.  Date of doctor appointment:  11/15  Date of lab appointment:  11/8  Physical or EP: Physical  Comments:

## 2019-07-18 ENCOUNTER — OFFICE VISIT (OUTPATIENT)
Dept: INTERNAL MEDICINE | Facility: CLINIC | Age: 74
End: 2019-07-18
Payer: MEDICARE

## 2019-07-18 ENCOUNTER — TELEPHONE (OUTPATIENT)
Dept: INTERNAL MEDICINE | Facility: CLINIC | Age: 74
End: 2019-07-18

## 2019-07-18 ENCOUNTER — TELEPHONE (OUTPATIENT)
Dept: CARDIOLOGY | Facility: CLINIC | Age: 74
End: 2019-07-18

## 2019-07-18 VITALS
BODY MASS INDEX: 23.69 KG/M2 | TEMPERATURE: 98 F | DIASTOLIC BLOOD PRESSURE: 79 MMHG | WEIGHT: 142.19 LBS | HEIGHT: 65 IN | HEART RATE: 60 BPM | SYSTOLIC BLOOD PRESSURE: 143 MMHG

## 2019-07-18 DIAGNOSIS — M67.431 GANGLION CYST OF BOTH WRISTS: ICD-10-CM

## 2019-07-18 DIAGNOSIS — D17.23 LIPOMA OF RIGHT LOWER EXTREMITY: Primary | ICD-10-CM

## 2019-07-18 DIAGNOSIS — M67.432 GANGLION CYST OF BOTH WRISTS: ICD-10-CM

## 2019-07-18 PROCEDURE — 99999 PR PBB SHADOW E&M-EST. PATIENT-LVL III: ICD-10-PCS | Mod: PBBFAC,HCNC,, | Performed by: INTERNAL MEDICINE

## 2019-07-18 PROCEDURE — 3077F PR MOST RECENT SYSTOLIC BLOOD PRESSURE >= 140 MM HG: ICD-10-PCS | Mod: HCNC,CPTII,S$GLB, | Performed by: INTERNAL MEDICINE

## 2019-07-18 PROCEDURE — 3078F DIAST BP <80 MM HG: CPT | Mod: HCNC,CPTII,S$GLB, | Performed by: INTERNAL MEDICINE

## 2019-07-18 PROCEDURE — 1101F PT FALLS ASSESS-DOCD LE1/YR: CPT | Mod: HCNC,CPTII,S$GLB, | Performed by: INTERNAL MEDICINE

## 2019-07-18 PROCEDURE — 99213 OFFICE O/P EST LOW 20 MIN: CPT | Mod: HCNC,S$GLB,, | Performed by: INTERNAL MEDICINE

## 2019-07-18 PROCEDURE — 3077F SYST BP >= 140 MM HG: CPT | Mod: HCNC,CPTII,S$GLB, | Performed by: INTERNAL MEDICINE

## 2019-07-18 PROCEDURE — 1101F PR PT FALLS ASSESS DOC 0-1 FALLS W/OUT INJ PAST YR: ICD-10-PCS | Mod: HCNC,CPTII,S$GLB, | Performed by: INTERNAL MEDICINE

## 2019-07-18 PROCEDURE — 99213 PR OFFICE/OUTPT VISIT, EST, LEVL III, 20-29 MIN: ICD-10-PCS | Mod: HCNC,S$GLB,, | Performed by: INTERNAL MEDICINE

## 2019-07-18 PROCEDURE — 3078F PR MOST RECENT DIASTOLIC BLOOD PRESSURE < 80 MM HG: ICD-10-PCS | Mod: HCNC,CPTII,S$GLB, | Performed by: INTERNAL MEDICINE

## 2019-07-18 PROCEDURE — 99999 PR PBB SHADOW E&M-EST. PATIENT-LVL III: CPT | Mod: PBBFAC,HCNC,, | Performed by: INTERNAL MEDICINE

## 2019-07-18 NOTE — TELEPHONE ENCOUNTER
----- Message from Valerie Owen sent at 7/18/2019  1:10 PM CDT -----  Contact: pt   Pt would like a call in ref to her Valsartan 320 mg. The pt thinks she's having side effects with lumps on her wrist, and one of her knees is swollen on one side. Please call her at [phone].    Thanks

## 2019-07-18 NOTE — TELEPHONE ENCOUNTER
----- Message from Kaylee Bower sent at 7/18/2019  1:59 PM CDT -----  Contact: Pt self Home [phone]  Patient is calling in regards to her saying that she have knots that's on both of her wrist and both of her knees. She said that her right arm is also a little puffy and she said that she had the knots on her wrist for about a week now and she noticed the knots on her knees on today. She would like a call back to speak with you about this please.

## 2019-07-18 NOTE — TELEPHONE ENCOUNTER
Spoke with pt to advise that an OV is needed and offered an appt today at 2:40 with dr. Angeles.    Verbalized understanding and agreeable to the appt.

## 2019-07-18 NOTE — PROGRESS NOTES
History of present illness:  [age] year lady with hypertension dyslipidemia is in today with a couple of complaints.  She has noticed a couple small knots on her wrist recently.  No pain. She does reports she started doing some light weights recently with wrist flexion.  She also reports noticing a lump fullness in the medial aspect of her right knee recently is no pain .  No trauma.  She has an appointment with me scheduled for November of this year for annual health assessment.    Current medications:  All medications noted and reviewed in the electronic medical record medication list.    Review of systems:  Constitutional:  No fever no chills no generalized body aches.  Cardiovascular:  No chest pain no palpitations no syncope.  Respiratory:  No cough shortness of breath.  Skin:  No rashes.    Past medical history, past surgical history, family medical history social history is are all noted reviewed in the electronic medical record history sections.    Physical examination:  General:  Pleasant alert appropriately groomed lady no acute distress.  Musculoskeletal exam:  She has a couple of small ganglion cyst on both wrists the most prominent is 1 cm in the anterior radial aspect of the left wrist.  Nontender.  She also has a lipoma over the medial aspect of the right knee about 5 cm.  There is no fluctuance.  There is no redness.  No tenderness. No discoloration.  The knee execute full range of motion stability and strength.  There is no fullness in the popliteal fossa.    Impression:  Simple uncomplicated ganglion cyst wrists.  Lipoma right lower extremity has noted nontender    Plan:  Discussed reassured.  We did discuss potential other therapies if the ganglion cyst or other become painful or larger.  She will observe.  Return to clinic in November of this year for general health assessment or before if needed.

## 2019-07-18 NOTE — TELEPHONE ENCOUNTER
Pt called stating that she has been on Valsartan for almost 3 months and has developed lumps around her wrist. Pt stated that she is unsure if this is a side effect of taking Valsartan. Pt stated that she scheduled an appointment to see her dermatologist on Wednesday. I transferred pt to Dr. Angeles's office to discuss the lumps. Please advise.

## 2019-07-22 ENCOUNTER — TELEPHONE (OUTPATIENT)
Dept: INTERNAL MEDICINE | Facility: CLINIC | Age: 74
End: 2019-07-22

## 2019-07-22 NOTE — TELEPHONE ENCOUNTER
Advise for now increase her dos eof the amlodipine 5mg tablet to two tablets daily and give us a bp update in 2 weeks   [facility]     Emergency Department     Faculty Attestation    I performed a history and physical examination of the patient and discussed management with the resident. I have reviewed and agree with the residents findings including all diagnostic interpretations, and treatment plans as written. Any areas of disagreement are noted on the chart. I was personally present for the key portions of any procedures. I have documented in the chart those procedures where I was not present during the key portions. I have reviewed the emergency nurses triage note. I agree with the chief complaint, past medical history, past surgical history, allergies, medications, social and family history as documented unless otherwise noted below. Documentation of the HPI, Physical Exam and Medical Decision Making performed by scribgeno is based on my personal performance of the HPI, PE and MDM. For Physician Assistant/ Nurse Practitioner cases/documentation I have personally evaluated this patient and have completed at least one if not all key elements of the E/M (history, physical exam, and MDM). Additional findings are as noted. 33 yo M past hx sz in distant past, had possible seizure 1 h prior, no fever c/o feeling dizzy currently , does not currently take seizure meds, no incontinence,   pe  Hypotensive, flat affect, kala eomi no cervical tenderness crepitus or deformity no oral injury no pronator drift,   Abdomen non tender, no mass no rebound no guarding,     Ct stable, lab stable, vss,   I feel pt stable for out pt tx. Talkative ambulatory tolerating liquids,         Pre-hypertension/Hypertension: The patient has been informed that they may have pre-hypertension or Hypertension based on a blood pressure reading in the emergency department.  I recommend that the patient call the primary care provider listed on their discharge instructions or a physician of their choice this

## 2019-07-22 NOTE — TELEPHONE ENCOUNTER
----- Message from Amalia Dow sent at 7/22/2019  8:24 AM CDT -----  Contact: patient [phone]  Pt was told to keep a bp log with readings and she states that after monitoring her readings for 3-4 days, the readings are still high.    157/80 170/90  167/84   This morning it is 149/87 and last night it was 159/82 and has never been this high . She is concerned and would like to speak with the nurse.

## 2019-07-23 ENCOUNTER — TELEPHONE (OUTPATIENT)
Dept: CARDIOLOGY | Facility: CLINIC | Age: 74
End: 2019-07-23

## 2019-07-23 DIAGNOSIS — I10 ESSENTIAL HYPERTENSION: Primary | ICD-10-CM

## 2019-07-23 RX ORDER — ALPRAZOLAM 0.5 MG/1
TABLET ORAL
Qty: 20 TABLET | Refills: 0 | Status: SHIPPED | OUTPATIENT
Start: 2019-07-23 | End: 2019-12-23

## 2019-07-23 RX ORDER — HYDROCHLOROTHIAZIDE 12.5 MG/1
12.5 TABLET ORAL DAILY
Qty: 30 TABLET | Refills: 11 | Status: SHIPPED | OUTPATIENT
Start: 2019-07-23 | End: 2021-07-15

## 2019-07-23 NOTE — TELEPHONE ENCOUNTER
We advised yesterday to increase her amlodipine to two tabs daily (2 x 5mg=10mg total).  It will take at least 4-5 days to start seeing a change. rec continue to observe.

## 2019-07-23 NOTE — TELEPHONE ENCOUNTER
Pt called stating that Dr. Angeles advised her to record her bp for the past 3 days. Pt stated that her bp has been ranging between 150's-170's/80's-90's HR60's. Pt wanted you to be aware. Please advise.

## 2019-07-23 NOTE — TELEPHONE ENCOUNTER
----- Message from Valerie Owen sent at 7/23/2019 12:23 PM CDT -----  Contact: pt   Pt returning a missed call from the office    Thanks

## 2019-07-24 NOTE — TELEPHONE ENCOUNTER
Add hctz 12.5 mg.   Instruct pt to eat/drink K containing foods and keep BP record (min 2-3 times a week).  BMP in 1 mo  Review BPs at that time

## 2019-08-27 ENCOUNTER — LAB VISIT (OUTPATIENT)
Dept: LAB | Facility: HOSPITAL | Age: 74
End: 2019-08-27
Attending: INTERNAL MEDICINE
Payer: MEDICARE

## 2019-08-27 DIAGNOSIS — I10 ESSENTIAL HYPERTENSION: ICD-10-CM

## 2019-08-27 LAB
ANION GAP SERPL CALC-SCNC: 7 MMOL/L (ref 8–16)
BUN SERPL-MCNC: 13 MG/DL (ref 8–23)
CALCIUM SERPL-MCNC: 9.6 MG/DL (ref 8.7–10.5)
CHLORIDE SERPL-SCNC: 97 MMOL/L (ref 95–110)
CO2 SERPL-SCNC: 29 MMOL/L (ref 23–29)
CREAT SERPL-MCNC: 0.9 MG/DL (ref 0.5–1.4)
EST. GFR  (AFRICAN AMERICAN): >60 ML/MIN/1.73 M^2
EST. GFR  (NON AFRICAN AMERICAN): >60 ML/MIN/1.73 M^2
GLUCOSE SERPL-MCNC: 100 MG/DL (ref 70–110)
POTASSIUM SERPL-SCNC: 4.4 MMOL/L (ref 3.5–5.1)
SODIUM SERPL-SCNC: 133 MMOL/L (ref 136–145)

## 2019-08-27 PROCEDURE — 36415 COLL VENOUS BLD VENIPUNCTURE: CPT | Mod: HCNC,PO

## 2019-08-27 PROCEDURE — 80048 BASIC METABOLIC PNL TOTAL CA: CPT | Mod: HCNC

## 2019-08-28 ENCOUNTER — TELEPHONE (OUTPATIENT)
Dept: CARDIOLOGY | Facility: CLINIC | Age: 74
End: 2019-08-28

## 2019-08-28 NOTE — TELEPHONE ENCOUNTER
Pt reports recent BP/P readings as follows:  7/24   2:30pm 118/73.66  7/26   9:30am 136/74,65  7/27 10:30am 145/79,58  7/28   8:50am 114/69,65  7/29 11:00am 116/64,60  7/30   9:30am 134/67,63  7/31 10:00am 134/72,64  8/01   8:30am 130/79,56  8/03   2:00pm 136/74,59  8/04   8:10am 133/82,60           12:28ka671/71,60  8/05 10:00am 113/68,65  8/06 12:00pm   99/62,62  8/07 10:30am 127/75,61  8/08   6:05pm 128/76,66  8/13   9:00am 114/76,64  8/14   7:05pm 135/72,68  8/17 12:05am 117/62,68  8/22 11:30am 112/70,68  8/26   9:20am 121/67,60    Please advise.

## 2019-09-03 RX ORDER — ALPRAZOLAM 0.25 MG/1
TABLET ORAL
Qty: 20 TABLET | Refills: 0 | Status: SHIPPED | OUTPATIENT
Start: 2019-09-03 | End: 2019-12-23

## 2019-09-03 RX ORDER — AMLODIPINE BESYLATE 5 MG/1
TABLET ORAL
Qty: 90 TABLET | Refills: 3 | Status: SHIPPED | OUTPATIENT
Start: 2019-09-03 | End: 2019-11-15

## 2019-09-05 ENCOUNTER — TELEPHONE (OUTPATIENT)
Dept: CARDIOLOGY | Facility: CLINIC | Age: 74
End: 2019-09-05

## 2019-09-05 NOTE — TELEPHONE ENCOUNTER
BPs look good. Labs were good as well, except for a slightly low sodium level.   Sodium is the blood is sometimes affected by diuretics. Suggest that she take HCTZ 5 days a week instead of every day and recheck BMP in 2-3 months

## 2019-09-10 ENCOUNTER — TELEPHONE (OUTPATIENT)
Dept: INTERNAL MEDICINE | Facility: CLINIC | Age: 74
End: 2019-09-10

## 2019-09-10 NOTE — TELEPHONE ENCOUNTER
----- Message from Kaylee Bower sent at 9/10/2019  9:29 AM CDT -----  Contact: Pt self Home [phone]  Patient is calling in regards to her saying that she's been having anxiety really bad since this passed Sunday. She said that she's taking her anxiety medication but she would like to know if she should take more pills and how often she can take her medication? Patient would like a call to discuss this with you please.

## 2019-09-10 NOTE — TELEPHONE ENCOUNTER
"Spoke with pt who advises that she has noted increased anxiety for the last couple of days.    She just got a new puppy and her granddaughter has been sick.    She has noted nervousness in her stomach (no pain, abnormal bowel issues, or other).    She has Xanax 0.5 mg to take PRN.    Pt did take an extra dose and noted that her "nerves" calmed.    Pt would like to know if she can take an extra pill as needed and if so, how often can she do this.    Please advise.    Thanks.  "

## 2019-10-07 RX ORDER — ROSUVASTATIN CALCIUM 40 MG/1
TABLET, COATED ORAL
Qty: 90 TABLET | Refills: 0 | Status: SHIPPED | OUTPATIENT
Start: 2019-10-07 | End: 2020-04-09

## 2019-11-08 ENCOUNTER — LAB VISIT (OUTPATIENT)
Dept: LAB | Facility: HOSPITAL | Age: 74
End: 2019-11-08
Attending: INTERNAL MEDICINE
Payer: MEDICARE

## 2019-11-08 DIAGNOSIS — I10 ESSENTIAL HYPERTENSION: ICD-10-CM

## 2019-11-08 DIAGNOSIS — E78.00 HYPERCHOLESTEREMIA: ICD-10-CM

## 2019-11-08 LAB
ALBUMIN SERPL BCP-MCNC: 3.8 G/DL (ref 3.5–5.2)
ALP SERPL-CCNC: 66 U/L (ref 55–135)
ALT SERPL W/O P-5'-P-CCNC: 15 U/L (ref 10–44)
ANION GAP SERPL CALC-SCNC: 6 MMOL/L (ref 8–16)
AST SERPL-CCNC: 21 U/L (ref 10–40)
BASOPHILS # BLD AUTO: 0.04 K/UL (ref 0–0.2)
BASOPHILS NFR BLD: 0.8 % (ref 0–1.9)
BILIRUB SERPL-MCNC: 0.6 MG/DL (ref 0.1–1)
BUN SERPL-MCNC: 10 MG/DL (ref 8–23)
CALCIUM SERPL-MCNC: 9.6 MG/DL (ref 8.7–10.5)
CHLORIDE SERPL-SCNC: 97 MMOL/L (ref 95–110)
CHOLEST SERPL-MCNC: 174 MG/DL (ref 120–199)
CHOLEST/HDLC SERPL: 2.3 {RATIO} (ref 2–5)
CO2 SERPL-SCNC: 28 MMOL/L (ref 23–29)
CREAT SERPL-MCNC: 0.9 MG/DL (ref 0.5–1.4)
DIFFERENTIAL METHOD: ABNORMAL
EOSINOPHIL # BLD AUTO: 0.2 K/UL (ref 0–0.5)
EOSINOPHIL NFR BLD: 3.5 % (ref 0–8)
ERYTHROCYTE [DISTWIDTH] IN BLOOD BY AUTOMATED COUNT: 12.5 % (ref 11.5–14.5)
EST. GFR  (AFRICAN AMERICAN): >60 ML/MIN/1.73 M^2
EST. GFR  (NON AFRICAN AMERICAN): >60 ML/MIN/1.73 M^2
GLUCOSE SERPL-MCNC: 95 MG/DL (ref 70–110)
HCT VFR BLD AUTO: 40.3 % (ref 37–48.5)
HDLC SERPL-MCNC: 77 MG/DL (ref 40–75)
HDLC SERPL: 44.3 % (ref 20–50)
HGB BLD-MCNC: 13.5 G/DL (ref 12–16)
IMM GRANULOCYTES # BLD AUTO: 0.01 K/UL (ref 0–0.04)
IMM GRANULOCYTES NFR BLD AUTO: 0.2 % (ref 0–0.5)
LDLC SERPL CALC-MCNC: 79.4 MG/DL (ref 63–159)
LYMPHOCYTES # BLD AUTO: 1.5 K/UL (ref 1–4.8)
LYMPHOCYTES NFR BLD: 30.6 % (ref 18–48)
MCH RBC QN AUTO: 32.2 PG (ref 27–31)
MCHC RBC AUTO-ENTMCNC: 33.5 G/DL (ref 32–36)
MCV RBC AUTO: 96 FL (ref 82–98)
MONOCYTES # BLD AUTO: 0.5 K/UL (ref 0.3–1)
MONOCYTES NFR BLD: 9.4 % (ref 4–15)
NEUTROPHILS # BLD AUTO: 2.7 K/UL (ref 1.8–7.7)
NEUTROPHILS NFR BLD: 55.5 % (ref 38–73)
NONHDLC SERPL-MCNC: 97 MG/DL
NRBC BLD-RTO: 0 /100 WBC
PLATELET # BLD AUTO: 253 K/UL (ref 150–350)
PMV BLD AUTO: 11.4 FL (ref 9.2–12.9)
POTASSIUM SERPL-SCNC: 4.6 MMOL/L (ref 3.5–5.1)
PROT SERPL-MCNC: 6.9 G/DL (ref 6–8.4)
RBC # BLD AUTO: 4.19 M/UL (ref 4–5.4)
SODIUM SERPL-SCNC: 131 MMOL/L (ref 136–145)
TRIGL SERPL-MCNC: 88 MG/DL (ref 30–150)
TSH SERPL DL<=0.005 MIU/L-ACNC: 2.5 UIU/ML (ref 0.4–4)
WBC # BLD AUTO: 4.81 K/UL (ref 3.9–12.7)

## 2019-11-08 PROCEDURE — 80061 LIPID PANEL: CPT | Mod: HCNC

## 2019-11-08 PROCEDURE — 36415 COLL VENOUS BLD VENIPUNCTURE: CPT | Mod: HCNC,PO

## 2019-11-08 PROCEDURE — 84443 ASSAY THYROID STIM HORMONE: CPT | Mod: HCNC

## 2019-11-08 PROCEDURE — 80053 COMPREHEN METABOLIC PANEL: CPT | Mod: HCNC

## 2019-11-08 PROCEDURE — 85025 COMPLETE CBC W/AUTO DIFF WBC: CPT | Mod: HCNC

## 2019-11-15 ENCOUNTER — OFFICE VISIT (OUTPATIENT)
Dept: INTERNAL MEDICINE | Facility: CLINIC | Age: 74
End: 2019-11-15
Payer: MEDICARE

## 2019-11-15 VITALS
HEIGHT: 65 IN | HEART RATE: 58 BPM | BODY MASS INDEX: 24.36 KG/M2 | SYSTOLIC BLOOD PRESSURE: 115 MMHG | RESPIRATION RATE: 14 BRPM | WEIGHT: 146.19 LBS | DIASTOLIC BLOOD PRESSURE: 58 MMHG

## 2019-11-15 DIAGNOSIS — M85.80 OSTEOPENIA, UNSPECIFIED LOCATION: ICD-10-CM

## 2019-11-15 DIAGNOSIS — Z78.0 POST-MENOPAUSE: ICD-10-CM

## 2019-11-15 DIAGNOSIS — Z00.00 ENCOUNTER FOR PREVENTIVE HEALTH EXAMINATION: Primary | ICD-10-CM

## 2019-11-15 DIAGNOSIS — I10 ESSENTIAL HYPERTENSION: ICD-10-CM

## 2019-11-15 DIAGNOSIS — N39.0 URINARY TRACT INFECTION WITHOUT HEMATURIA, SITE UNSPECIFIED: ICD-10-CM

## 2019-11-15 DIAGNOSIS — E78.5 DYSLIPIDEMIA: ICD-10-CM

## 2019-11-15 PROCEDURE — 99397 PER PM REEVAL EST PAT 65+ YR: CPT | Mod: HCNC,S$GLB,, | Performed by: INTERNAL MEDICINE

## 2019-11-15 PROCEDURE — 99397 PR PREVENTIVE VISIT,EST,65 & OVER: ICD-10-PCS | Mod: HCNC,S$GLB,, | Performed by: INTERNAL MEDICINE

## 2019-11-15 PROCEDURE — 99499 UNLISTED E&M SERVICE: CPT | Mod: HCNC,S$GLB,, | Performed by: INTERNAL MEDICINE

## 2019-11-15 PROCEDURE — 3078F DIAST BP <80 MM HG: CPT | Mod: HCNC,CPTII,S$GLB, | Performed by: INTERNAL MEDICINE

## 2019-11-15 PROCEDURE — 99999 PR PBB SHADOW E&M-EST. PATIENT-LVL III: CPT | Mod: PBBFAC,HCNC,, | Performed by: INTERNAL MEDICINE

## 2019-11-15 PROCEDURE — 99999 PR PBB SHADOW E&M-EST. PATIENT-LVL III: ICD-10-PCS | Mod: PBBFAC,HCNC,, | Performed by: INTERNAL MEDICINE

## 2019-11-15 PROCEDURE — 99499 RISK ADDL DX/OHS AUDIT: ICD-10-PCS | Mod: HCNC,S$GLB,, | Performed by: INTERNAL MEDICINE

## 2019-11-15 PROCEDURE — 3078F PR MOST RECENT DIASTOLIC BLOOD PRESSURE < 80 MM HG: ICD-10-PCS | Mod: HCNC,CPTII,S$GLB, | Performed by: INTERNAL MEDICINE

## 2019-11-15 PROCEDURE — 3074F SYST BP LT 130 MM HG: CPT | Mod: HCNC,CPTII,S$GLB, | Performed by: INTERNAL MEDICINE

## 2019-11-15 PROCEDURE — 3074F PR MOST RECENT SYSTOLIC BLOOD PRESSURE < 130 MM HG: ICD-10-PCS | Mod: HCNC,CPTII,S$GLB, | Performed by: INTERNAL MEDICINE

## 2019-11-15 RX ORDER — CEPHALEXIN 250 MG/1
250 CAPSULE ORAL 3 TIMES DAILY
Qty: 15 CAPSULE | Refills: 0 | Status: SHIPPED | OUTPATIENT
Start: 2019-11-15 | End: 2020-05-26 | Stop reason: ALTCHOICE

## 2019-11-15 RX ORDER — AMLODIPINE BESYLATE 5 MG/1
10 TABLET ORAL DAILY
Qty: 90 TABLET | Refills: 3
Start: 2019-11-15 | End: 2020-07-19

## 2019-11-15 NOTE — PROGRESS NOTES
History of present illness:  [age] year lady in today for general health assessment.    Current medications:  Medications noted reviewed and updated in the electronic medical record medication list.    Review of systems:  General: no fever, chills, generalized body aches. No unexpected weight loss.  Eyes:  No visual disturbances.  HEENT:  No hoarseness, dysphagia, ear pain.  Respiratory:  No cough, no shortness of breath.  Cardiovascular: no chest pain, palpitations, cough, exertional limb pain. No edema.  GI: no nausea, vomiting.  No abdominal pain. No change in bowel habits.  No melena, no hematochezia.  : no dysuria. No change in the color or character of the urine. No urinary frequency.  Musculoskeletal: no joint pain or swelling.  Neurologic:  No focal neurological complaints.  No headaches.  Skin:  No rashes or other concerns.  Psych:  No emotional issues      Past medical history, past surgical history, family medical history social history is are all noted and reviewed and updated in the electronic medical record history sections.      Health screenings:  She has had both pneumococcal vaccines.  Mammogram April 2019.  Eye exam May 2019.  Bone densitometry September 2017.    Physical examination:  GENERAL:  Alert, appropriately groomed, no acute distress.  VS:  Blood pressure taken manually by this examiner 118/62.  EYES: sclerae white ,nonicteric. PERRL.  HEENT:  Normocephalic. Ear canals and tympanic membranes normal. Mouth and pharynx normal. No thyromegaly. Trachea midline and freely mobile.  LUNGS:  Clear to ascultation and normal to percussion.  CARDIOVASCULAR:  Normal heart sounds.  No significant murmur. Carotids full bilaterally without bruit.  Pedal pulses intact .  No abdominal bruit.  No peripheral extremity edema.  GI: the abdomen is soft, no distension. No masses , tenderness, organomegaly.   LYMPHATIC:  No axillary, inguinal , cervical adenopathy.  MUSCULOSKELETAL:  Range of motion,  stability and strength of the right and left upper and lower extremities normal. No swollen or tender joints  NEUROLOGIC:  DTR's normal. No gross motor or sensory deficits apparent, gait normal.  SKIN:  No rashes.   MS:  Alert, oriented , affect and mood all appropriate      Data:  Health maintenance and other laboratory data noted reviewed from November 8, 2019.  All reasonable.  Urinalysis appears infected though she is asymptomatic.    Impression:  Generally healthy 74 year lady living a reasonably healthy lifestyle.  Hypertension is controlled.  Dyslipidemia on statin therapy.  Nonobstructive CAD.  Osteopenia did not tolerate alendronate due for updated bone densitometry.  Possible UTI.    Plan:  Update bone densitometry.  Presumptive treatment for UTI with cephalexin 250 mg t.i.d. 5 days.  She has a follow-up with Cardiology in April 2020 and thus she will see us back in 1 year for general health assessment.

## 2019-11-20 ENCOUNTER — TELEPHONE (OUTPATIENT)
Dept: CARDIOLOGY | Facility: CLINIC | Age: 74
End: 2019-11-20

## 2019-11-20 NOTE — TELEPHONE ENCOUNTER
Recent CMP results ordered by  reviewed by . Sodium level.  advised pt decrease HCTZ 12.5mg to 2 days per week and extra dose PRN. Pt notified, verbalized understanding.

## 2019-12-03 ENCOUNTER — APPOINTMENT (OUTPATIENT)
Dept: RADIOLOGY | Facility: CLINIC | Age: 74
End: 2019-12-03
Attending: INTERNAL MEDICINE
Payer: MEDICARE

## 2019-12-03 DIAGNOSIS — Z78.0 POST-MENOPAUSE: ICD-10-CM

## 2019-12-03 PROCEDURE — 77080 DXA BONE DENSITY AXIAL: CPT | Mod: TC,HCNC,PO

## 2019-12-03 PROCEDURE — 77080 DEXA BONE DENSITY SPINE HIP: ICD-10-PCS | Mod: 26,HCNC,, | Performed by: INTERNAL MEDICINE

## 2019-12-03 PROCEDURE — 77080 DXA BONE DENSITY AXIAL: CPT | Mod: 26,HCNC,, | Performed by: INTERNAL MEDICINE

## 2019-12-23 RX ORDER — ALPRAZOLAM 0.25 MG/1
TABLET ORAL
Qty: 20 TABLET | Refills: 0 | Status: SHIPPED | OUTPATIENT
Start: 2019-12-23 | End: 2020-02-05

## 2020-01-08 ENCOUNTER — PES CALL (OUTPATIENT)
Dept: ADMINISTRATIVE | Facility: CLINIC | Age: 75
End: 2020-01-08

## 2020-01-14 ENCOUNTER — TELEPHONE (OUTPATIENT)
Dept: INTERNAL MEDICINE | Facility: CLINIC | Age: 75
End: 2020-01-14

## 2020-01-15 ENCOUNTER — OFFICE VISIT (OUTPATIENT)
Dept: INTERNAL MEDICINE | Facility: CLINIC | Age: 75
End: 2020-01-15
Payer: MEDICARE

## 2020-01-15 VITALS
HEIGHT: 65 IN | BODY MASS INDEX: 25.2 KG/M2 | WEIGHT: 151.25 LBS | HEART RATE: 67 BPM | SYSTOLIC BLOOD PRESSURE: 133 MMHG | DIASTOLIC BLOOD PRESSURE: 70 MMHG | OXYGEN SATURATION: 97 % | RESPIRATION RATE: 15 BRPM

## 2020-01-15 DIAGNOSIS — I10 ESSENTIAL HYPERTENSION: ICD-10-CM

## 2020-01-15 DIAGNOSIS — E78.00 HYPERCHOLESTEREMIA: ICD-10-CM

## 2020-01-15 DIAGNOSIS — M85.80 OSTEOPENIA, UNSPECIFIED LOCATION: ICD-10-CM

## 2020-01-15 DIAGNOSIS — Z00.00 ENCOUNTER FOR PREVENTIVE HEALTH EXAMINATION: Primary | ICD-10-CM

## 2020-01-15 DIAGNOSIS — E78.5 DYSLIPIDEMIA: ICD-10-CM

## 2020-01-15 DIAGNOSIS — I25.10 CORONARY ARTERY DISEASE DUE TO CALCIFIED CORONARY LESION: ICD-10-CM

## 2020-01-15 DIAGNOSIS — E87.1 HYPONATREMIA: ICD-10-CM

## 2020-01-15 DIAGNOSIS — R00.1 BRADYCARDIA: ICD-10-CM

## 2020-01-15 DIAGNOSIS — I25.84 CORONARY ARTERY DISEASE DUE TO CALCIFIED CORONARY LESION: ICD-10-CM

## 2020-01-15 DIAGNOSIS — I44.7 LBBB (LEFT BUNDLE BRANCH BLOCK): ICD-10-CM

## 2020-01-15 DIAGNOSIS — E55.9 VITAMIN D DEFICIENCY: ICD-10-CM

## 2020-01-15 PROCEDURE — 3075F PR MOST RECENT SYSTOLIC BLOOD PRESS GE 130-139MM HG: ICD-10-PCS | Mod: HCNC,CPTII,S$GLB, | Performed by: NURSE PRACTITIONER

## 2020-01-15 PROCEDURE — 99499 RISK ADDL DX/OHS AUDIT: ICD-10-PCS | Mod: HCNC,S$GLB,, | Performed by: NURSE PRACTITIONER

## 2020-01-15 PROCEDURE — 3078F PR MOST RECENT DIASTOLIC BLOOD PRESSURE < 80 MM HG: ICD-10-PCS | Mod: HCNC,CPTII,S$GLB, | Performed by: NURSE PRACTITIONER

## 2020-01-15 PROCEDURE — 99499 UNLISTED E&M SERVICE: CPT | Mod: HCNC,S$GLB,, | Performed by: NURSE PRACTITIONER

## 2020-01-15 PROCEDURE — 3078F DIAST BP <80 MM HG: CPT | Mod: HCNC,CPTII,S$GLB, | Performed by: NURSE PRACTITIONER

## 2020-01-15 PROCEDURE — 99999 PR PBB SHADOW E&M-EST. PATIENT-LVL IV: ICD-10-PCS | Mod: PBBFAC,HCNC,, | Performed by: NURSE PRACTITIONER

## 2020-01-15 PROCEDURE — 3075F SYST BP GE 130 - 139MM HG: CPT | Mod: HCNC,CPTII,S$GLB, | Performed by: NURSE PRACTITIONER

## 2020-01-15 PROCEDURE — 99999 PR PBB SHADOW E&M-EST. PATIENT-LVL IV: CPT | Mod: PBBFAC,HCNC,, | Performed by: NURSE PRACTITIONER

## 2020-01-15 PROCEDURE — G0439 PR MEDICARE ANNUAL WELLNESS SUBSEQUENT VISIT: ICD-10-PCS | Mod: HCNC,S$GLB,, | Performed by: NURSE PRACTITIONER

## 2020-01-15 PROCEDURE — G0439 PPPS, SUBSEQ VISIT: HCPCS | Mod: HCNC,S$GLB,, | Performed by: NURSE PRACTITIONER

## 2020-01-15 NOTE — PROGRESS NOTES
"Nesha Landa presented for a  Medicare AWV and comprehensive Health Risk Assessment today. The following components were reviewed and updated:    · Medical history  · Family History  · Social history  · Allergies and Current Medications  · Health Risk Assessment  · Health Maintenance  · Care Team external GI, ENT    ** See Completed Assessments for Annual Wellness Visit within the encounter summary.**       The following assessments were completed:  · Living Situation  · CAGE  · Depression Screening  · Timed Get Up and Go  · Whisper Test  · Cognitive Function Screening  · Nutrition Screening  · ADL Screening  · PAQ Screening    Vitals:    01/15/20 0825   BP: 133/70   BP Location: Left arm   Patient Position: Sitting   Pulse: 67   Resp: 15   SpO2: 97%   Weight: 68.6 kg (151 lb 3.8 oz)   Height: 5' 4.5" (1.638 m)     Body mass index is 25.56 kg/m².  Physical Exam   Constitutional: She is oriented to person, place, and time. She appears well-developed and well-nourished.   Looks younger than stated age   HENT:   Head: Normocephalic.   Mouth/Throat: No oropharyngeal exudate.   Cardiovascular: Normal rate, regular rhythm and normal heart sounds.   No murmur heard.  Pulmonary/Chest: Effort normal and breath sounds normal.   Abdominal: Soft. Bowel sounds are normal.   Musculoskeletal: Normal range of motion. She exhibits no edema.   Neurological: She is alert and oriented to person, place, and time. She exhibits normal muscle tone.   Skin: Skin is warm and dry.   Psychiatric: She has a normal mood and affect. Her behavior is normal. Judgment and thought content normal.   Nursing note and vitals reviewed.        Lab Results   Component Value Date    HGBA1C 6.1 05/17/2012    CHOL 174 11/08/2019    HDL 77 (H) 11/08/2019    LDLCALC 79.4 11/08/2019    TRIG 88 11/08/2019    CHOLHDL 44.3 11/08/2019      Results for orders placed in visit on 12/03/19   DXA Bone Density Spine And Hip    Narrative EXAMINATION:  DEXA BONE DENSITY SPINE " HIP    CLINICAL HISTORY:  Asymptomatic menopausal state74 y/o female with no history of fractures.  She had a hysterectomy at 35 y/o.  She is taking Calcium and Vit D supplements.  She received 3 injections of steroids this year.  She exercises daily and does not smoke.    TECHNIQUE:  DXA specification: Spectraseis [id number].    Bone Mineral Density scanning was performed over the hip and lumbar spine. Review of the images confirms satisfactory positioning and technique.    COMPARISON:  Comparison study done on 09/21/2017. Lumbar spine BMD 0.953 g/cm2 and the T-score -0.9.  The Total Hip BMD 0.827 g/cm2 and the T-score -0.9.    FINDINGS:  Lumbar Spine: Lumbar bone mineral density L1-L4 is 0.942g/cm2, which is a T-score of -1.0. The Z-score is 1.5.    Total Hip: Total hip bone mineral density is 0.846g/cm2.  The T-score is -0.8, and the Z-score is 1.1.    Femoral neck: Bone mineral density is 0.581g/cm2 and the T-score is -2.4 and the Z-score is -0.3 g/cm2.    There is a  16% risk of a major osteoporotic fracture and a 4.9% risk of hip fracture in the next 10 years (FRAX).    Compared with previous DXA, BMD at the lumbar spine has remained stable, and the BMD at the total hip has remained stable.      Impression LOW BONE MASS WITHOUT SIGNIFICANT CHANGE COMPARED TO THE PRIOR STUDY.  THE ESTIMATED 10 YEAR PROBABILITY OF HIP FRACTURE IS 4.9% AND OF A MAJOR OSTEOPOROTIC FRACTURE 16% RESPECTIVELY USING FRAX.  RECOMMENDATIONS of [facility]:    1.  Calcium 1200 mg daily and vitamin D 800 units daily, adequate exercise.    2.  Recommended therapy Consider bisphosphonates (alendronate, risedronate, or zolendronic acid).  If all bisphosphonates contraindicated, consider denosumab depending on contraindications.    3.  Repeat BMD in 2 years    EXPLANATION OF RESULTS:    The T-score compares these results to the average bone density of a 20 year-old of the same gender. The  Z-score compares this result to the average bone density to people of the same age, gender, and race. The amounts indicate the number of standard deviations above or below the mean.    * Osteoporosis is generally defined as having a T-score between less than or equal to -2.5.    * Osteopenia is generally defined as having a T-score between -1.0 and -2.5.    * The normal range is generally defined as having a t-score greater than or equal to -1.0.    * Calculated FRAX scores for fracture risk prediction may not be accurate in the setting of certain clinical factors such as pharmacologic therapy for osteoporosis, prior fragility fractures, high dose glucocorticoid use etc.      Electronically signed by: Rai Mcqueen  Date:    12/04/2019  Time:    17:51     Results for orders placed during the hospital encounter of 07/21/14   Mammo Digital Screening Bilat with CAD    Narrative The present examination has been compared to a prior imaging study dated  11/25/2011.    BILATERAL MAMMOGRAM FINDINGS:  There are scattered fibroglandular densities.    No significant abnormalities are seen.    These images  were processed to produce digital images analyzed for potential  abnormalities.      Impression There is no mammographic evidence of malignancy.    Mammogram in 1 year is recommended.    ACR BI-RADS Category 1: Negative      JOSE JEAN M.D.  07/21/2014       Diagnoses and health risks identified today and associated recommendations/orders:    1. Essential hypertension  Stable- followed by cardiology    2. Hypercholesteremia  Stable- followed by cardiology    3. LBBB (left bundle branch block)  Stable- followed by cardiology    4. Coronary artery disease due to calcified coronary lesion  Stable- followed by cardiology    5. Vitamin D deficiency  Stable- followed by PCP    6. Bradycardia - asymptomatic  Stable- followed by cardiology    7. Osteopenia, unspecified location  Stable- followed by PCP-DXA 12/3/2019    8.  Dyslipidemia  Stable- followed by cardiology    9. Encounter for preventive health examination  Assessments completed. Preventative health recommendations reviewed.       10. Hyponatremia  Stable- followed by cardiology, PCP      Provided Nesha with a 5-10 year written screening schedule and personal prevention plan. Recommendations were developed using the USPSTF age appropriate recommendations. Education, counseling, and referrals were provided as needed. After Visit Summary printed and given to patient which includes a list of additional screenings\tests needed. Fall prevention handouts. Exercises regularly at gym- includes weight bearing.    Follow up in about 1 year (around 1/15/2021) for HRA.    Rosa Elena Rosales NP I offered to discuss end of life issues, including information on how to make advance directives that the patient could use to name someone who would make medical decisions on their behalf if they became too ill to make themselves.    ___Patient declined  _X_Patient is interested, I provided paper work and offered to discuss.

## 2020-01-15 NOTE — PATIENT INSTRUCTIONS
Counseling and Referral of Other Preventative  (Italic type indicates deductible and co-insurance are waived)    Patient Name: Nesha Landa  Today's Date: 1/15/2020    Health Maintenance       Date Due Completion Date    TETANUS VACCINE In the future for injury or trauma   ---    Shingles Vaccine (1 of 2) [facility] handout given   ---    Aspirin/Antiplatelet Therapy 01/15/2021 1/15/2020        DEXA SCAN 12/03/2021   12/3/2019    Lipid Panel    Mammogram    Annual eye exam- done    Colonoscopy-external 11/08/2020 12/6/2023- check with GI    11/8/2019 4/17/2019 12/6/2018- external GI          No orders of the defined types were placed in this encounter.    The following information is provided to all patients.  This information is to help you find resources for any of the problems found today that may be affecting your health:                Living healthy guide: www.[facility].[place].[facility]      Understanding Diabetes: www.diabetes.org      Eating healthy: www.cdc.gov/healthyweight      [facility] home safety checklist: www.cdc.gov/steadi/patient.html      Agency on Aging: www.goea.[place].[facility]      Alcoholics anonymous (AA): www.aa.org      Physical Activity: www.nohemi.nih.gov/fn9hgdu      Tobacco use: www.quitwithusla.org     Exercises to Prevent Falls  Certain types of exercises may help make you less likely to fall. Try the ones below. Or do other exercises that your health care provider suggests. Depending on your health, you may need to start slowly. Don't let that stop you. Even small amounts of exercise can help you. Be sure to talk to your health care provider before starting any exercise program.       Improve balance  Many types of exercise can help improve balance. Ed chi and yoga are good examples. Here's another one to try. You can do it anytime and almost anywhere.  · Stand next to a counter or solid support.  · Push yourself up onto your tiptoes.  · Hold for 5 seconds. If you start to lose your  "balance, hold on to the counter.  · Rest and repeat 5 times. Work up to holding for 20 to 30 seconds, if you can. Increase flexibility  Being more flexible makes it easier for you to move around safely. Try exercises like the seated hamstring stretch.  · Sit in a chair and put one foot on a stool.  · Straighten your leg and reach with both hands down either side of your leg. Reach as far down your leg as you can.  · Hold for about 20 seconds.  · Go back to the starting position. Then repeat 5 times. Switch legs. Build strength  "Resistance" exercises help build strength. You can do them without equipment. Or you can use weights, elastic bands, or special machines. One such exercise is called the biceps curl. You can hold a 1-pound weight or even a can of soup. Do this exercise at least 3 times a week. Strive for every day.  · Sit up straight in a chair.  · Keep your elbow close to your body and your wrist straight.  · Bend your arm, moving your hand up to your shoulder. Then slowly lower your arm.  · Repeat 5 times. Switch to the other arm.   Build your staying power  Aerobic exercises make your heart and lungs stronger so you can keep moving longer. Walking and swimming are two of the best types of exercises you can do. Using a stationary bike is great, too. Find an aerobic exercise that you enjoy. Start slowly and build up. Even 5 minutes is helpful. Aim for a goal of 30 minutes, at least 3 times a week. You don't have to do 30 minutes in 1 session. Break it up and walk a little throughout the day.  More helpful tips  · Start easy. Slowly work up to doing more.  · Talk with your health care provider about the best exercises for you.  · Call senior centers or health clubs about exercise programs.  · If needed, have a family member watch you walk every so often to check your stability.  · Exercise with a friend. Choose an activity you both enjoy.  · Consider ronda chi or yoga to strengthen your balance.  · Try " exercises that you can do anytime, anywhere. Here are 2 examples. Have someone with you when you first try these:  ¨ Practice walking by placing 1 foot right in front of the other.  ¨ Stand up and sit down 10 times. Repeat this throughout the day.   Date Last Reviewed: 6/13/2015  © 7026-5494 [employer]. [address] [place] [address], [place] [id number]. All rights reserved. This information is not intended as a substitute for professional medical care. Always follow your healthcare professional's instructions.

## 2020-02-03 ENCOUNTER — TELEPHONE (OUTPATIENT)
Dept: INTERNAL MEDICINE | Facility: CLINIC | Age: 75
End: 2020-02-03

## 2020-02-03 NOTE — TELEPHONE ENCOUNTER
----- Message from Kaylee Bower sent at 2/3/2020  9:14 AM CST -----  Contact: Pt self Home [phone]  Patient would like a call back in regards to her wanting to know how to fill out a Withdraw of life procedures form.

## 2020-02-05 RX ORDER — ALPRAZOLAM 0.25 MG/1
TABLET ORAL
Qty: 20 TABLET | Refills: 0 | Status: SHIPPED | OUTPATIENT
Start: 2020-02-05 | End: 2020-03-16

## 2020-02-27 RX ORDER — CARVEDILOL 25 MG/1
TABLET ORAL
Qty: 180 TABLET | Refills: 3 | Status: SHIPPED | OUTPATIENT
Start: 2020-02-27 | End: 2021-01-26

## 2020-03-16 RX ORDER — ALPRAZOLAM 0.25 MG/1
TABLET ORAL
Qty: 20 TABLET | Refills: 1 | Status: SHIPPED | OUTPATIENT
Start: 2020-03-16 | End: 2020-05-25

## 2020-03-24 ENCOUNTER — TELEPHONE (OUTPATIENT)
Dept: INTERNAL MEDICINE | Facility: CLINIC | Age: 75
End: 2020-03-24

## 2020-03-24 NOTE — TELEPHONE ENCOUNTER
Spoke with pt who advises that she has since stopped the medication but had taken such for 3 days.    She declines nausea medications as she does have medication on hand for such.    States that she received the medication from her ENT and when she called to advise of her symptoms, she was advised to stop the medication and restart amoxicillin and a medication for nausea was called in as well for her.    She will call again if symptoms are not resolving.

## 2020-03-24 NOTE — TELEPHONE ENCOUNTER
----- Message from Amalia Dow sent at 3/24/2020  7:48 AM CDT -----  Contact: patient [phone]  Patient called with c/o nausea and diarrhea since taking a z-pack for a sinus infection which was ordered by her ENT doctor. Pt c/o of bloating and rumbling in her stomach,  Pt has discontinued the antibiotic and would like advice. Please call pt.

## 2020-03-30 RX ORDER — VALSARTAN 320 MG/1
320 TABLET ORAL DAILY
Qty: 90 TABLET | Refills: 3 | Status: SHIPPED | OUTPATIENT
Start: 2020-03-30 | End: 2021-04-04

## 2020-04-09 RX ORDER — ROSUVASTATIN CALCIUM 40 MG/1
TABLET, COATED ORAL
Qty: 90 TABLET | Refills: 0 | Status: SHIPPED | OUTPATIENT
Start: 2020-04-09 | End: 2020-07-20 | Stop reason: SDUPTHER

## 2020-05-04 ENCOUNTER — TELEPHONE (OUTPATIENT)
Dept: OBSTETRICS AND GYNECOLOGY | Facility: CLINIC | Age: 75
End: 2020-05-04

## 2020-05-04 DIAGNOSIS — Z12.31 ENCOUNTER FOR SCREENING MAMMOGRAM FOR BREAST CANCER: Primary | ICD-10-CM

## 2020-05-04 NOTE — TELEPHONE ENCOUNTER
----- Message from Candido Schultz sent at 5/4/2020 10:23 AM CDT -----  Contact: DANY JONES [[id number]]   Name of Who is Calling:     What is the request in detail:  Patient request call back in reference to mammogram /annual questions/concerns Please contact to further discuss and advise      Can the clinic reply by [facility]: no    What Number to Call Back if not in [facility]:  [phone]

## 2020-05-15 ENCOUNTER — TELEPHONE (OUTPATIENT)
Dept: OBSTETRICS AND GYNECOLOGY | Facility: CLINIC | Age: 75
End: 2020-05-15

## 2020-05-15 NOTE — TELEPHONE ENCOUNTER
----- Message from Valerie Fong MA sent at 5/15/2020 11:48 AM CDT -----  Contact: DANY JONES [[id number]]      ----- Message -----  From: Elle Tanner  Sent: 5/15/2020  10:53 AM CDT  To: Julianne VALDIVIA Staff    Name of Who is Calling: DANY JONES [[id number]]      What is the request in detail:  Patient called requesting to schedule her annual WWE and mammogram only at the [place] location. I did attempt to schedule per patient's request but was not successful.   Please give a call back at your earliest convenience and further advise.  THANKS!     Reply by [facility]: no    What Number to Call Back :  DANY JONES // # [phone]

## 2020-05-19 ENCOUNTER — LAB VISIT (OUTPATIENT)
Dept: LAB | Facility: HOSPITAL | Age: 75
End: 2020-05-19
Attending: INTERNAL MEDICINE
Payer: MEDICARE

## 2020-05-19 DIAGNOSIS — E78.00 HYPERCHOLESTEREMIA: ICD-10-CM

## 2020-05-19 DIAGNOSIS — I10 ESSENTIAL HYPERTENSION: ICD-10-CM

## 2020-05-19 LAB
ALT SERPL W/O P-5'-P-CCNC: 13 U/L (ref 10–44)
ANION GAP SERPL CALC-SCNC: 8 MMOL/L (ref 8–16)
AST SERPL-CCNC: 19 U/L (ref 10–40)
BUN SERPL-MCNC: 12 MG/DL (ref 8–23)
CALCIUM SERPL-MCNC: 9.2 MG/DL (ref 8.7–10.5)
CHLORIDE SERPL-SCNC: 100 MMOL/L (ref 95–110)
CO2 SERPL-SCNC: 28 MMOL/L (ref 23–29)
CREAT SERPL-MCNC: 0.8 MG/DL (ref 0.5–1.4)
EST. GFR  (AFRICAN AMERICAN): >60 ML/MIN/1.73 M^2
EST. GFR  (NON AFRICAN AMERICAN): >60 ML/MIN/1.73 M^2
GLUCOSE SERPL-MCNC: 100 MG/DL (ref 70–110)
POTASSIUM SERPL-SCNC: 4.1 MMOL/L (ref 3.5–5.1)
SODIUM SERPL-SCNC: 136 MMOL/L (ref 136–145)

## 2020-05-19 PROCEDURE — 84460 ALANINE AMINO (ALT) (SGPT): CPT | Mod: HCNC

## 2020-05-19 PROCEDURE — 84450 TRANSFERASE (AST) (SGOT): CPT | Mod: HCNC

## 2020-05-19 PROCEDURE — 80061 LIPID PANEL: CPT | Mod: HCNC

## 2020-05-19 PROCEDURE — 36415 COLL VENOUS BLD VENIPUNCTURE: CPT | Mod: HCNC,PO

## 2020-05-19 PROCEDURE — 80048 BASIC METABOLIC PNL TOTAL CA: CPT | Mod: HCNC

## 2020-05-20 LAB
CHOLEST SERPL-MCNC: 172 MG/DL (ref 120–199)
CHOLEST/HDLC SERPL: 2.8 {RATIO} (ref 2–5)
HDLC SERPL-MCNC: 62 MG/DL (ref 40–75)
HDLC SERPL: 36 % (ref 20–50)
LDLC SERPL CALC-MCNC: 87.8 MG/DL (ref 63–159)
NONHDLC SERPL-MCNC: 110 MG/DL
TRIGL SERPL-MCNC: 111 MG/DL (ref 30–150)

## 2020-05-22 ENCOUNTER — TELEPHONE (OUTPATIENT)
Dept: CARDIOLOGY | Facility: CLINIC | Age: 75
End: 2020-05-22

## 2020-05-22 ENCOUNTER — HOSPITAL ENCOUNTER (OUTPATIENT)
Dept: RADIOLOGY | Facility: HOSPITAL | Age: 75
Discharge: HOME | End: 2020-05-22
Attending: OBSTETRICS & GYNECOLOGY
Payer: MEDICARE

## 2020-05-22 DIAGNOSIS — Z12.31 ENCOUNTER FOR SCREENING MAMMOGRAM FOR BREAST CANCER: ICD-10-CM

## 2020-05-22 PROCEDURE — 77063 BREAST TOMOSYNTHESIS BI: CPT | Mod: 26,HCNC,, | Performed by: RADIOLOGY

## 2020-05-22 PROCEDURE — 77063 MAMMO DIGITAL SCREENING BILAT WITH TOMOSYNTHESIS_CAD: ICD-10-PCS | Mod: 26,HCNC,, | Performed by: RADIOLOGY

## 2020-05-22 PROCEDURE — 77067 SCR MAMMO BI INCL CAD: CPT | Mod: TC,HCNC,PO

## 2020-05-22 PROCEDURE — 77067 SCR MAMMO BI INCL CAD: CPT | Mod: 26,HCNC,, | Performed by: RADIOLOGY

## 2020-05-22 PROCEDURE — 77067 MAMMO DIGITAL SCREENING BILAT WITH TOMOSYNTHESIS_CAD: ICD-10-PCS | Mod: 26,HCNC,, | Performed by: RADIOLOGY

## 2020-05-22 NOTE — TELEPHONE ENCOUNTER
Pt completed travel screening .      ----- Message from Gayatri Roberts MA sent at 5/22/2020  9:58 AM CDT -----  Contact: patient call  The patient is returning your phone call please call [phone]. Thank you.

## 2020-05-25 ENCOUNTER — PATIENT OUTREACH (OUTPATIENT)
Dept: ADMINISTRATIVE | Facility: OTHER | Age: 75
End: 2020-05-25

## 2020-05-25 RX ORDER — ALPRAZOLAM 0.25 MG/1
TABLET ORAL
Qty: 20 TABLET | Refills: 0 | Status: SHIPPED | OUTPATIENT
Start: 2020-05-25 | End: 2020-06-29

## 2020-05-26 ENCOUNTER — OFFICE VISIT (OUTPATIENT)
Dept: CARDIOLOGY | Facility: CLINIC | Age: 75
End: 2020-05-26
Payer: MEDICARE

## 2020-05-26 ENCOUNTER — TELEPHONE (OUTPATIENT)
Dept: INTERNAL MEDICINE | Facility: CLINIC | Age: 75
End: 2020-05-26

## 2020-05-26 VITALS
HEIGHT: 65 IN | HEART RATE: 60 BPM | BODY MASS INDEX: 25.42 KG/M2 | WEIGHT: 152.56 LBS | DIASTOLIC BLOOD PRESSURE: 66 MMHG | SYSTOLIC BLOOD PRESSURE: 112 MMHG

## 2020-05-26 DIAGNOSIS — E78.00 HYPERCHOLESTEREMIA: ICD-10-CM

## 2020-05-26 DIAGNOSIS — I25.10 CORONARY ARTERY DISEASE DUE TO CALCIFIED CORONARY LESION: Primary | ICD-10-CM

## 2020-05-26 DIAGNOSIS — I44.7 LBBB (LEFT BUNDLE BRANCH BLOCK): ICD-10-CM

## 2020-05-26 DIAGNOSIS — I42.9 CARDIOMYOPATHY, UNSPECIFIED TYPE: ICD-10-CM

## 2020-05-26 DIAGNOSIS — I25.84 CORONARY ARTERY DISEASE DUE TO CALCIFIED CORONARY LESION: Primary | ICD-10-CM

## 2020-05-26 DIAGNOSIS — R09.89 DECREASED PEDAL PULSES: ICD-10-CM

## 2020-05-26 PROBLEM — R69 DIAGNOSIS DEFERRED: Status: ACTIVE | Noted: 2020-05-26

## 2020-05-26 PROCEDURE — 93005 EKG 12-LEAD: ICD-10-PCS | Mod: HCNC,S$GLB,, | Performed by: INTERNAL MEDICINE

## 2020-05-26 PROCEDURE — 1101F PT FALLS ASSESS-DOCD LE1/YR: CPT | Mod: HCNC,CPTII,S$GLB, | Performed by: INTERNAL MEDICINE

## 2020-05-26 PROCEDURE — 99214 OFFICE O/P EST MOD 30 MIN: CPT | Mod: HCNC,S$GLB,, | Performed by: INTERNAL MEDICINE

## 2020-05-26 PROCEDURE — 99999 PR PBB SHADOW E&M-EST. PATIENT-LVL V: ICD-10-PCS | Mod: PBBFAC,HCNC,, | Performed by: INTERNAL MEDICINE

## 2020-05-26 PROCEDURE — 99499 UNLISTED E&M SERVICE: CPT | Mod: HCNC,S$GLB,, | Performed by: INTERNAL MEDICINE

## 2020-05-26 PROCEDURE — 93010 ELECTROCARDIOGRAM REPORT: CPT | Mod: HCNC,S$GLB,, | Performed by: INTERNAL MEDICINE

## 2020-05-26 PROCEDURE — 1126F AMNT PAIN NOTED NONE PRSNT: CPT | Mod: HCNC,S$GLB,, | Performed by: INTERNAL MEDICINE

## 2020-05-26 PROCEDURE — 3074F SYST BP LT 130 MM HG: CPT | Mod: HCNC,CPTII,S$GLB, | Performed by: INTERNAL MEDICINE

## 2020-05-26 PROCEDURE — 99214 PR OFFICE/OUTPT VISIT, EST, LEVL IV, 30-39 MIN: ICD-10-PCS | Mod: HCNC,S$GLB,, | Performed by: INTERNAL MEDICINE

## 2020-05-26 PROCEDURE — 1126F PR PAIN SEVERITY QUANTIFIED, NO PAIN PRESENT: ICD-10-PCS | Mod: HCNC,S$GLB,, | Performed by: INTERNAL MEDICINE

## 2020-05-26 PROCEDURE — 3078F PR MOST RECENT DIASTOLIC BLOOD PRESSURE < 80 MM HG: ICD-10-PCS | Mod: HCNC,CPTII,S$GLB, | Performed by: INTERNAL MEDICINE

## 2020-05-26 PROCEDURE — 1159F MED LIST DOCD IN RCRD: CPT | Mod: HCNC,S$GLB,, | Performed by: INTERNAL MEDICINE

## 2020-05-26 PROCEDURE — 99999 PR PBB SHADOW E&M-EST. PATIENT-LVL V: CPT | Mod: PBBFAC,HCNC,, | Performed by: INTERNAL MEDICINE

## 2020-05-26 PROCEDURE — 1101F PR PT FALLS ASSESS DOC 0-1 FALLS W/OUT INJ PAST YR: ICD-10-PCS | Mod: HCNC,CPTII,S$GLB, | Performed by: INTERNAL MEDICINE

## 2020-05-26 PROCEDURE — 3074F PR MOST RECENT SYSTOLIC BLOOD PRESSURE < 130 MM HG: ICD-10-PCS | Mod: HCNC,CPTII,S$GLB, | Performed by: INTERNAL MEDICINE

## 2020-05-26 PROCEDURE — 1159F PR MEDICATION LIST DOCUMENTED IN MEDICAL RECORD: ICD-10-PCS | Mod: HCNC,S$GLB,, | Performed by: INTERNAL MEDICINE

## 2020-05-26 PROCEDURE — 93005 ELECTROCARDIOGRAM TRACING: CPT | Mod: HCNC,S$GLB,, | Performed by: INTERNAL MEDICINE

## 2020-05-26 PROCEDURE — 99499 RISK ADDL DX/OHS AUDIT: ICD-10-PCS | Mod: HCNC,S$GLB,, | Performed by: INTERNAL MEDICINE

## 2020-05-26 PROCEDURE — 3078F DIAST BP <80 MM HG: CPT | Mod: HCNC,CPTII,S$GLB, | Performed by: INTERNAL MEDICINE

## 2020-05-26 PROCEDURE — 93010 EKG 12-LEAD: ICD-10-PCS | Mod: HCNC,S$GLB,, | Performed by: INTERNAL MEDICINE

## 2020-05-26 NOTE — TELEPHONE ENCOUNTER
----- Message from Gracie Gentile sent at 5/26/2020 10:53 AM CDT -----  Contact: Patient [phone]  Patient is requesting a call about getting a tetanus shot.    Please call and advise.    Thank You

## 2020-05-26 NOTE — TELEPHONE ENCOUNTER
Noted.    Spoke with pt to advise that such has been ordered.    Verbalized understanding and requested to be scheduled for 9 am on 5/28/20.

## 2020-05-26 NOTE — PATIENT INSTRUCTIONS
Start taking a full tab of Crestor     LDL - bad type - improves with diet and medications: typically statins; most other medications that lower LDL have not been proven to prevent heart attacks.  May not improve significantly with exercise alone.  Ideally less than 100 mg/dl.   But the lower the better. Statins (cholesterol lowering meds) lower LDL. If you have coronary artery disease or blockages anywhere else in the body, it should be well below 70 mg/dl.    HDL - good type - improves with exercise.  Ideally greater than 50 mg/dl. The proportion of HDL to the total cholesterol is more important than the absolute HDL.  This means a HDL of 45 out of a total cholesterol of 130 mg'dl is pretty good, but the same HDL out of a total of  200 mg/dl is not quite as good. A level of 30% or higher is ideal.    TGs (triglycerides) - also bad - can change very quickly and considerably with certain foods. Improve with diet, exercise and high dose fish oil.  In some cases a low carbohydrate diet will lower TGs better than a low fat diet.  Ideal range  mg/dl.    Sugar, fat and cholesterol in food:     A sensible diet that limits the intake of sugars, saturated (bad) fats and trans fats while increasing the intake of unsaturated (good) fats and plant proteins is the basis of the current dietary recommendations.      We now recommend drastically reducing the intake of sugar. There is less emphasis on excluding all fat and more emphasis on the types of different fats.    Cholesterol in our food is generally present in relatively small amounts. New dietary guidelines are less obsessed with the amount of cholesterol. However please do not confuse this with the role of cholesterol in our blood and arteries. The liver converts certain foods into cholesterol.  It is this cholesterol and other fats that clogs up our arteries.      Most foods that are high in cholesterol are also high in saturated fat. But there is much more  "saturated fat than cholesterol in these foods. Researchers believe that the saturated fat content matters more than cholesterol. On the other hand, there are a handful of foods that are high in cholesterol but do not contain much saturated fat: eggs, shrimp, crab legs and crawfish are OK to eat in modest quantities as long as you do not deep tyson them. So a few eggs a week are fine (both the white and the yolk), but you can eat as many egg whites as you want. Also, some of these same foods irritate the finding of blood vessels by inducing inflammation.  This occurs even if your blood cholesterol levels are "normal". So you should avoid foods that are high in saturated fat and sugar even if your blood cholesterol levels are normal.      Saturated fat is the bad fat - you should limit your intake of this. Deep fried foods, meats and other animal fats are high in saturated fat. Cookies, donuts and most dessert and cakes are usually high in both saturated fat and sugar.       Unsaturated fat is the good fat. It contains the same number of calories as saturated fat but these fats do not get deposited in our arteries. The Mediterranean style diet encourages the intake of unsaturated fat - olive oil, avocado and unsalted nuts. So instead of baking a piece of fish, consider pan-frying it using olive oil.     You should eat a few servings of vegetables (and fruit as long as you are not diabetic) everyday. Substitute some plant proteins in place of meat: beans, lentils, quinoa and oatmeal. They are lean proteins.     Do not use stick butter or stick margarine. Butter that comes in a tub is soft butter. It consists of 1/2 butter and 1/2 vegetable oil., either canola or olive oil. It is fine to use that.       Trans fats should definitely be avoided. Most foods that are labelled as containing 0 gms of trans fat may still contain several hundred milligrams of trans fat: creamer, margarine, dough, deep fried foods, ready made " frosting, potato, corn and torilla chips, cakes, cookies, pie crusts and crackers containing shortening made with hydrogenated vegetable oil.

## 2020-05-26 NOTE — PROGRESS NOTES
Subjective:       Problem List:  CACS >600   Hyperlipidemia   Hypertension  LBBB  Cardiomyopathy - resolved    HPI:   Nesha Landa is a 74 y.o. female who presents for follow-up of subclinical atherosclerosis. She does not report angina or shortness of breath with exertion.   LDL(c) is <100 on 40 mg of rosuvastatin. Hepatic transaminases are within normal limits.     BPs at home are good. On amlodipine, carvedilol and valsartan. HCTZ twice a week. Mild hyponatremia was present in 11/2019. Resolved after reducing HCTZ to twice a week.      Review of Systems   Constitution: Negative for malaise/fatigue, weight gain and weight loss.   Cardiovascular: Negative for chest pain, dyspnea on exertion, leg swelling and palpitations.   Respiratory: Negative for cough and hemoptysis.    Hematologic/Lymphatic: Does not bruise/bleed easily.   Musculoskeletal: Negative for arthritis and muscle cramps.   Gastrointestinal: Negative for abdominal pain, heartburn and melena.   Genitourinary: Negative for hematuria and nocturia.   Neurological: Positive for excessive daytime sleepiness and headaches. Negative for light-headedness and seizures.   Psychiatric/Behavioral: Negative for depression. The patient is nervous/anxious.        Review of patient's allergies indicates:   Allergen Reactions    Ciprofloxacin Nausea Only    Flagyl  [metronidazole] Nausea Only    Sulfa (sulfonamide antibiotics)         Current Outpatient Medications   Medication Sig    ALPRAZolam (XANAX) 0.25 MG tablet TAKE 1 TABLET BY MOUTH EVERY DAY AS NEEDED FOR ANXIETY    amLODIPine (NORVASC) 5 MG tablet Take 2 tablets (10 mg total) by mouth once daily.    aspirin 81 MG Chew Take 81 mg by mouth once daily.    butalbital-acetaminophen-caffeine -40 mg (FIORICET, ESGIC) -40 mg per tablet Take 1 tablet by mouth as needed.    calcium carbonate-vitamin D3 (CALCIUM 600 + D,3,) 600-125 mg-unit Tab Take 1 tablet by mouth once daily.    carvediloL  (COREG) 25 MG tablet TAKE 1 TABLET TWICE DAILY    cholecalciferol, vitamin D3, 1,000 unit capsule Take 1 capsule by mouth Daily.      coenzyme Q10 200 mg capsule Take 200 mg by mouth once daily.    cyanocobalamin, vitamin B-12, (VITAMIN B-12) 50 mcg tablet Take 1 tablet by mouth Daily.      ELDERBERRY FRUIT ORAL Take 5 mLs by mouth 2 (two) times daily.    fish oil-omega-3 fatty acids 300-1,000 mg capsule Take 1 g by mouth once daily.    fluticasone (FLONASE) 50 mcg/actuation nasal spray 1 spray as needed.     folic acid (FOLVITE) 400 MCG tablet Take 400 mcg by mouth once daily.    hydroCHLOROthiazide (HYDRODIURIL) 12.5 MG Tab Take 1 tablet (12.5 mg total) by mouth once daily. (Patient taking differently: Take 12.5 mg by mouth. 2 days per week and extra PRN)    LACTOBACILLUS ACIDOPHILUS (ACIDOPHILUS ORAL) Take 1 capsule by mouth once daily.     montelukast (SINGULAIR) 10 mg tablet Take 10 mg by mouth every evening.    multivit with minerals/lutein (MULTIVITAMIN 50 PLUS ORAL) Take 1 tablet by mouth once daily.    omeprazole (PRILOSEC) 20 MG capsule Take 1 capsule by mouth Every PM.      ondansetron (ZOFRAN-ODT) 4 MG TbDL Take 4 mg by mouth as needed.     polyethylene glycol (MIRALAX) 17 gram/dose powder Take by mouth as needed. Pt taking 1 capful prn.    promethazine (PHENERGAN) 25 MG tablet TAKE 1 TABLET(25 MG) BY MOUTH EVERY 6 HOURS AS NEEDED FOR NAUSEA    rosuvastatin (CRESTOR) 40 MG Tab TAKING 1/2 A TABLET BY MOUTH DAILY    SIMETHICONE (GAS-X ORAL) Take 1 tablet by mouth as needed.    TURMERIC ROOT EXTRACT ORAL Take 2 capsules by mouth once daily.    valsartan (DIOVAN) 320 MG tablet TAKE 1 TABLET (320 MG TOTAL) BY MOUTH ONCE DAILY.    vitamin E 400 UNIT capsule Take 400 mg by mouth once daily.     No current facility-administered medications for this visit.        Social history:  Nesha Landa  reports that she has never smoked. She has never used smokeless tobacco. She reports that she does  "not drink alcohol or use drugs.      Objective:   /66   Pulse 60   Ht 5' 5" (1.651 m)   Wt 69.2 kg (152 lb 8.9 oz)   BMI 25.39 kg/m²      Physical Exam   Constitutional: She is oriented to person, place, and time. She appears well-developed and well-nourished.   /66   Pulse 60   Ht 5' 5" (1.651 m)   Wt 69.2 kg (152 lb 8.9 oz)   BMI 25.39 kg/m²      Neck: No JVD present. Carotid bruit is not present.   Cardiovascular: Normal rate and regular rhythm.   No murmur heard.  Pulses:       Radial pulses are 2+ on the right side, and 2+ on the left side.        Posterior tibial pulses are 1+ on the right side, and 1+ on the left side.   Pulmonary/Chest: She has no decreased breath sounds. She has no wheezes. She has no rales. Chest wall is not dull to percussion.   Abdominal: Soft. Normal appearance. There is no splenomegaly or hepatomegaly. There is no tenderness.   Musculoskeletal:        Right lower leg: She exhibits no edema.        Left lower leg: She exhibits no edema.   Neurological: She is alert and oriented to person, place, and time.   Skin: Skin is warm. No bruising noted. Nails show no clubbing.   Psychiatric: Her speech is normal and behavior is normal. Cognition and memory are normal.           Lab Results   Component Value Date    CHOL 172 05/19/2020    HDL 62 05/19/2020    LDLCALC 87.8 05/19/2020    TRIG 111 05/19/2020    CHOLHDL 36.0 05/19/2020     Lab Results   Component Value Date     05/19/2020    CREATININE 0.8 05/19/2020    BUN 12 05/19/2020     05/19/2020    K 4.1 05/19/2020     05/19/2020    CO2 28 05/19/2020     Lab Results   Component Value Date    ALT 13 05/19/2020    AST 19 05/19/2020    ALKPHOS 66 11/08/2019    BILITOT 0.6 11/08/2019       ECG today reviewed by me. It reveals sinus bradycardia at 57 bpm w a LBBB.      Assessment and Plan:     Coronary artery disease due to calcified coronary lesion  -     IN OFFICE EKG 12-LEAD (to [place]wagen); Future  -     Echo " Color Flow Doppler? Yes; Future; Expected date: 06/09/2020    LBBB (left bundle branch block)  -     IN OFFICE EKG 12-LEAD (to Grulla); Future  -     Echo Color Flow Doppler? Yes; Future; Expected date: 06/09/2020    Cardiomyopathy - resolved  -     IN OFFICE EKG 12-LEAD (to Grulla); Future  -     Echo Color Flow Doppler? Yes; Future; Expected date: 06/09/2020    Hypercholesteremia  Increase rosuvastatin to 40 mg a day    Decreased pedal pulses  No claudication. ABIs relatively normal in 2015. She does not need to repeat ABIs       Follow up in about 1 year (around 5/26/2021), or if symptoms worsen or fail to improve.

## 2020-05-28 ENCOUNTER — CLINICAL SUPPORT (OUTPATIENT)
Dept: INTERNAL MEDICINE | Facility: CLINIC | Age: 75
End: 2020-05-28
Payer: MEDICARE

## 2020-05-28 PROCEDURE — 90714 TD VACC NO PRESV 7 YRS+ IM: CPT | Mod: HCNC,S$GLB,, | Performed by: INTERNAL MEDICINE

## 2020-05-28 PROCEDURE — 90714 TD VACCINE GREATER THAN OR EQUAL TO 7YO PRESERVATIVE FREE IM: ICD-10-PCS | Mod: HCNC,S$GLB,, | Performed by: INTERNAL MEDICINE

## 2020-05-28 PROCEDURE — 90471 TD VACCINE GREATER THAN OR EQUAL TO 7YO PRESERVATIVE FREE IM: ICD-10-PCS | Mod: HCNC,S$GLB,, | Performed by: INTERNAL MEDICINE

## 2020-05-28 PROCEDURE — 90471 IMMUNIZATION ADMIN: CPT | Mod: HCNC,S$GLB,, | Performed by: INTERNAL MEDICINE

## 2020-05-28 NOTE — PROGRESS NOTES
Pt received TD vaccine as ordered by dr. Angeles on 5/26/20.  Tolerated well with no complaints of pain or discomfort.  Pt agreeable to wait in the lobby 15 minutes following vaccine.

## 2020-06-13 ENCOUNTER — OFFICE VISIT (OUTPATIENT)
Dept: URGENT CARE | Facility: CLINIC | Age: 75
End: 2020-06-13
Payer: MEDICARE

## 2020-06-13 VITALS
BODY MASS INDEX: 25.29 KG/M2 | HEART RATE: 76 BPM | DIASTOLIC BLOOD PRESSURE: 81 MMHG | SYSTOLIC BLOOD PRESSURE: 152 MMHG | WEIGHT: 152 LBS | TEMPERATURE: 97 F | RESPIRATION RATE: 10 BRPM | OXYGEN SATURATION: 97 %

## 2020-06-13 DIAGNOSIS — R30.0 DYSURIA: ICD-10-CM

## 2020-06-13 DIAGNOSIS — N30.01 ACUTE CYSTITIS WITH HEMATURIA: Primary | ICD-10-CM

## 2020-06-13 LAB
BILIRUB UR QL STRIP: NEGATIVE
GLUCOSE UR QL STRIP: NEGATIVE
KETONES UR QL STRIP: NEGATIVE
LEUKOCYTE ESTERASE UR QL STRIP: POSITIVE
PH, POC UA: 5.5 (ref 5–8)
POC BLOOD, URINE: NEGATIVE
POC NITRATES, URINE: NEGATIVE
PROT UR QL STRIP: POSITIVE
SP GR UR STRIP: 1 (ref 1–1.03)
UROBILINOGEN UR STRIP-ACNC: ABNORMAL (ref 0.1–1.1)

## 2020-06-13 PROCEDURE — 99214 OFFICE O/P EST MOD 30 MIN: CPT | Mod: 25,S$GLB,, | Performed by: FAMILY MEDICINE

## 2020-06-13 PROCEDURE — 99214 PR OFFICE/OUTPT VISIT, EST, LEVL IV, 30-39 MIN: ICD-10-PCS | Mod: 25,S$GLB,, | Performed by: FAMILY MEDICINE

## 2020-06-13 PROCEDURE — 81003 URINALYSIS AUTO W/O SCOPE: CPT | Mod: QW,S$GLB,, | Performed by: FAMILY MEDICINE

## 2020-06-13 PROCEDURE — 81003 POCT URINALYSIS, DIPSTICK, AUTOMATED, W/O SCOPE: ICD-10-PCS | Mod: QW,S$GLB,, | Performed by: FAMILY MEDICINE

## 2020-06-13 RX ORDER — NITROFURANTOIN 25; 75 MG/1; MG/1
100 CAPSULE ORAL 2 TIMES DAILY
Qty: 14 CAPSULE | Refills: 0 | Status: SHIPPED | OUTPATIENT
Start: 2020-06-13 | End: 2020-06-20

## 2020-06-13 NOTE — PATIENT INSTRUCTIONS

## 2020-06-15 ENCOUNTER — CLINICAL SUPPORT (OUTPATIENT)
Dept: CARDIOLOGY | Facility: CLINIC | Age: 75
End: 2020-06-15
Attending: INTERNAL MEDICINE
Payer: MEDICARE

## 2020-06-15 VITALS
WEIGHT: 152 LBS | BODY MASS INDEX: 25.33 KG/M2 | DIASTOLIC BLOOD PRESSURE: 60 MMHG | HEART RATE: 52 BPM | HEIGHT: 65 IN | SYSTOLIC BLOOD PRESSURE: 125 MMHG

## 2020-06-15 DIAGNOSIS — I42.9 CARDIOMYOPATHY, UNSPECIFIED TYPE: ICD-10-CM

## 2020-06-15 DIAGNOSIS — I25.10 CORONARY ARTERY DISEASE DUE TO CALCIFIED CORONARY LESION: ICD-10-CM

## 2020-06-15 DIAGNOSIS — I25.84 CORONARY ARTERY DISEASE DUE TO CALCIFIED CORONARY LESION: ICD-10-CM

## 2020-06-15 DIAGNOSIS — I44.7 LBBB (LEFT BUNDLE BRANCH BLOCK): ICD-10-CM

## 2020-06-15 LAB
ASCENDING AORTA: 3.49 CM
AV INDEX (PROSTH): 0.77
AV MEAN GRADIENT: 3 MMHG
AV PEAK GRADIENT: 6 MMHG
AV REGURGITATION PRESSURE HALF TIME: 790 MS
AV VALVE AREA: 2.38 CM2
AV VELOCITY RATIO: 0.69
BSA FOR ECHO PROCEDURE: 1.78 M2
CV ECHO LV RWT: 0.41 CM
DOP CALC AO PEAK VEL: 1.22 M/S
DOP CALC AO VTI: 25.53 CM
DOP CALC LVOT AREA: 3.1 CM2
DOP CALC LVOT DIAMETER: 1.98 CM
DOP CALC LVOT PEAK VEL: 0.84 M/S
DOP CALC LVOT STROKE VOLUME: 60.69 CM3
DOP CALC RVOT PEAK VEL: 0.85 M/S
DOP CALC RVOT VTI: 21.11 CM
DOP CALCLVOT PEAK VEL VTI: 19.72 CM
E WAVE DECELERATION TIME: 333.16 MSEC
E/A RATIO: 0.75
E/E' RATIO: 13.43 M/S
ECHO LV POSTERIOR WALL: 0.92 CM (ref 0.6–1.1)
FRACTIONAL SHORTENING: 37 % (ref 28–44)
INTERVENTRICULAR SEPTUM: 1.2 CM (ref 0.6–1.1)
IVRT: 144.62 MSEC
LA MAJOR: 4.41 CM
LA MINOR: 4.32 CM
LA WIDTH: 3.05 CM
LEFT ATRIUM SIZE: 3.18 CM
LEFT ATRIUM VOLUME INDEX: 20.4 ML/M2
LEFT ATRIUM VOLUME: 35.98 CM3
LEFT INTERNAL DIMENSION IN SYSTOLE: 2.83 CM (ref 2.1–4)
LEFT VENTRICLE DIASTOLIC VOLUME INDEX: 51.4 ML/M2
LEFT VENTRICLE DIASTOLIC VOLUME: 90.49 ML
LEFT VENTRICLE MASS INDEX: 93 G/M2
LEFT VENTRICLE SYSTOLIC VOLUME INDEX: 17.3 ML/M2
LEFT VENTRICLE SYSTOLIC VOLUME: 30.41 ML
LEFT VENTRICULAR INTERNAL DIMENSION IN DIASTOLE: 4.46 CM (ref 3.5–6)
LEFT VENTRICULAR MASS: 163.82 G
LV LATERAL E/E' RATIO: 9.4 M/S
LV SEPTAL E/E' RATIO: 23.5 M/S
MV PEAK A VEL: 0.63 M/S
MV PEAK E VEL: 0.47 M/S
MV STENOSIS PRESSURE HALF TIME: 96.62 MS
MV VALVE AREA P 1/2 METHOD: 2.28 CM2
PISA TR MAX VEL: 1.53 M/S
PULM VEIN S/D RATIO: 0.91
PV MEAN GRADIENT: 1.73 MMHG
PV PEAK D VEL: 0.35 M/S
PV PEAK S VEL: 0.32 M/S
PV PEAK VELOCITY: 0.99 CM/S
RA MAJOR: 4.36 CM
RA PRESSURE: 3 MMHG
RA WIDTH: 3.02 CM
RIGHT VENTRICULAR END-DIASTOLIC DIMENSION: 2.52 CM
SINUS: 3.9 CM
STJ: 3.51 CM
TDI LATERAL: 0.05 M/S
TDI SEPTAL: 0.02 M/S
TDI: 0.04 M/S
TR MAX PG: 9 MMHG
TRICUSPID ANNULAR PLANE SYSTOLIC EXCURSION: 1.99 CM
TV REST PULMONARY ARTERY PRESSURE: 12 MMHG

## 2020-06-15 PROCEDURE — 99999 PR PBB SHADOW E&M-EST. PATIENT-LVL II: ICD-10-PCS | Mod: PBBFAC,HCNC,,

## 2020-06-15 PROCEDURE — 99999 PR PBB SHADOW E&M-EST. PATIENT-LVL II: CPT | Mod: PBBFAC,HCNC,,

## 2020-06-15 PROCEDURE — 93306 TTE W/DOPPLER COMPLETE: CPT | Mod: HCNC,S$GLB,, | Performed by: INTERNAL MEDICINE

## 2020-06-15 PROCEDURE — 93306 ECHO (CUPID ONLY): ICD-10-PCS | Mod: HCNC,S$GLB,, | Performed by: INTERNAL MEDICINE

## 2020-06-18 ENCOUNTER — TELEPHONE (OUTPATIENT)
Dept: CARDIOLOGY | Facility: CLINIC | Age: 75
End: 2020-06-18

## 2020-06-18 NOTE — TELEPHONE ENCOUNTER
----- Message from Salma Abdi sent at 6/18/2020 11:33 AM CDT -----  Regarding: results  Pt is calling for echo results from Monday and can be reached at [phone].    Thank you

## 2020-06-29 RX ORDER — ALPRAZOLAM 0.25 MG/1
TABLET ORAL
Qty: 20 TABLET | Refills: 0 | Status: SHIPPED | OUTPATIENT
Start: 2020-06-29 | End: 2020-07-29

## 2020-07-10 ENCOUNTER — HOSPITAL ENCOUNTER (OUTPATIENT)
Dept: RADIOLOGY | Facility: HOSPITAL | Age: 75
Discharge: HOME | End: 2020-07-10
Attending: INTERNAL MEDICINE
Payer: MEDICARE

## 2020-07-10 ENCOUNTER — OFFICE VISIT (OUTPATIENT)
Dept: INTERNAL MEDICINE | Facility: CLINIC | Age: 75
End: 2020-07-10
Payer: MEDICARE

## 2020-07-10 VITALS
DIASTOLIC BLOOD PRESSURE: 62 MMHG | WEIGHT: 151 LBS | SYSTOLIC BLOOD PRESSURE: 118 MMHG | BODY MASS INDEX: 25.16 KG/M2 | HEIGHT: 65 IN | HEART RATE: 71 BPM | OXYGEN SATURATION: 95 %

## 2020-07-10 DIAGNOSIS — T07.XXXA MULTIPLE CONTUSIONS: ICD-10-CM

## 2020-07-10 DIAGNOSIS — R10.2 SUPRAPUBIC PAIN: ICD-10-CM

## 2020-07-10 DIAGNOSIS — T07.XXXA MULTIPLE CONTUSIONS: Primary | ICD-10-CM

## 2020-07-10 LAB
BILIRUB SERPL-MCNC: NEGATIVE MG/DL
BLOOD URINE, POC: 50
CLARITY, POC UA: NORMAL
COLOR, POC UA: YELLOW
GLUCOSE UR QL STRIP: NORMAL
KETONES UR QL STRIP: NEGATIVE
LEUKOCYTE ESTERASE URINE, POC: NORMAL
NITRITE, POC UA: NEGATIVE
PH, POC UA: 5
PROTEIN, POC: 30
SPECIFIC GRAVITY, POC UA: 1.02
UROBILINOGEN, POC UA: NORMAL

## 2020-07-10 PROCEDURE — 73560 X-RAY EXAM OF KNEE 1 OR 2: CPT | Mod: TC,HCNC,RT

## 2020-07-10 PROCEDURE — 81002 URINALYSIS NONAUTO W/O SCOPE: CPT | Mod: HCNC,S$GLB,, | Performed by: INTERNAL MEDICINE

## 2020-07-10 PROCEDURE — 3074F SYST BP LT 130 MM HG: CPT | Mod: HCNC,CPTII,S$GLB, | Performed by: INTERNAL MEDICINE

## 2020-07-10 PROCEDURE — 73560 XR KNEE 1 OR 2 VIEW RIGHT: ICD-10-PCS | Mod: 26,HCNC,RT, | Performed by: RADIOLOGY

## 2020-07-10 PROCEDURE — 99999 PR PBB SHADOW E&M-EST. PATIENT-LVL III: CPT | Mod: PBBFAC,HCNC,, | Performed by: INTERNAL MEDICINE

## 2020-07-10 PROCEDURE — 1159F PR MEDICATION LIST DOCUMENTED IN MEDICAL RECORD: ICD-10-PCS | Mod: HCNC,S$GLB,, | Performed by: INTERNAL MEDICINE

## 2020-07-10 PROCEDURE — 81002 POCT URINE DIPSTICK WITHOUT MICROSCOPE: ICD-10-PCS | Mod: HCNC,S$GLB,, | Performed by: INTERNAL MEDICINE

## 2020-07-10 PROCEDURE — 99999 PR PBB SHADOW E&M-EST. PATIENT-LVL III: ICD-10-PCS | Mod: PBBFAC,HCNC,, | Performed by: INTERNAL MEDICINE

## 2020-07-10 PROCEDURE — 73130 XR HAND COMPLETE 3 VIEW LEFT: ICD-10-PCS | Mod: 26,HCNC,LT, | Performed by: RADIOLOGY

## 2020-07-10 PROCEDURE — 1101F PT FALLS ASSESS-DOCD LE1/YR: CPT | Mod: HCNC,CPTII,S$GLB, | Performed by: INTERNAL MEDICINE

## 2020-07-10 PROCEDURE — 73130 X-RAY EXAM OF HAND: CPT | Mod: TC,HCNC,LT

## 2020-07-10 PROCEDURE — 3078F PR MOST RECENT DIASTOLIC BLOOD PRESSURE < 80 MM HG: ICD-10-PCS | Mod: HCNC,CPTII,S$GLB, | Performed by: INTERNAL MEDICINE

## 2020-07-10 PROCEDURE — 1159F MED LIST DOCD IN RCRD: CPT | Mod: HCNC,S$GLB,, | Performed by: INTERNAL MEDICINE

## 2020-07-10 PROCEDURE — 3078F DIAST BP <80 MM HG: CPT | Mod: HCNC,CPTII,S$GLB, | Performed by: INTERNAL MEDICINE

## 2020-07-10 PROCEDURE — 3074F PR MOST RECENT SYSTOLIC BLOOD PRESSURE < 130 MM HG: ICD-10-PCS | Mod: HCNC,CPTII,S$GLB, | Performed by: INTERNAL MEDICINE

## 2020-07-10 PROCEDURE — 1101F PR PT FALLS ASSESS DOC 0-1 FALLS W/OUT INJ PAST YR: ICD-10-PCS | Mod: HCNC,CPTII,S$GLB, | Performed by: INTERNAL MEDICINE

## 2020-07-10 PROCEDURE — 73560 X-RAY EXAM OF KNEE 1 OR 2: CPT | Mod: 26,HCNC,RT, | Performed by: RADIOLOGY

## 2020-07-10 PROCEDURE — 99214 OFFICE O/P EST MOD 30 MIN: CPT | Mod: HCNC,25,S$GLB, | Performed by: INTERNAL MEDICINE

## 2020-07-10 PROCEDURE — 99214 PR OFFICE/OUTPT VISIT, EST, LEVL IV, 30-39 MIN: ICD-10-PCS | Mod: HCNC,25,S$GLB, | Performed by: INTERNAL MEDICINE

## 2020-07-10 PROCEDURE — 73130 X-RAY EXAM OF HAND: CPT | Mod: 26,HCNC,LT, | Performed by: RADIOLOGY

## 2020-07-10 RX ORDER — TRAMADOL HYDROCHLORIDE 50 MG/1
50 TABLET ORAL EVERY 6 HOURS PRN
Qty: 20 TABLET | Refills: 0 | Status: SHIPPED | OUTPATIENT
Start: 2020-07-10 | End: 2022-09-14
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
## 2020-07-14 ENCOUNTER — TELEPHONE (OUTPATIENT)
Dept: INTERNAL MEDICINE | Facility: CLINIC | Age: 75
End: 2020-07-14

## 2020-07-14 NOTE — TELEPHONE ENCOUNTER
----- Message from Anne Marie Rodriguez sent at 7/14/2020  3:36 PM CDT -----  Name of Who is Calling: PT    What is the request in detail: calling in regards to her test results. Please contact to further discuss and advise      Can the clinic reply by [facility]: no    What Number [facility]:  [phone]

## 2020-07-16 ENCOUNTER — TELEPHONE (OUTPATIENT)
Dept: INTERNAL MEDICINE | Facility: CLINIC | Age: 75
End: 2020-07-16

## 2020-07-16 NOTE — TELEPHONE ENCOUNTER
Pt had xrays as ordered by Dr. Vilchis on 7/10/20.    Please review and advise of results of such.    Thanks.

## 2020-07-16 NOTE — TELEPHONE ENCOUNTER
----- Message from Che Parikh sent at 7/16/2020  4:42 PM CDT -----  Contact: Self   Patient is returning a phone call.  Who left a message for the patient: Quincy Salmon MA  Does patient know what this is regarding:    Comments:

## 2020-07-16 NOTE — TELEPHONE ENCOUNTER
----- Message from Afshin Mena sent at 7/16/2020  8:09 AM CDT -----  Contact: Self   Calling to get test results.  Name of test (lab, xray, etc.):  xray   Date of test:  07/10  Ordering provider: HAIR PONCE  Where was the test performed:  [facility]  Would the patient rather a call back or a response via [facility]?:  call back  Comments:

## 2020-07-20 RX ORDER — ROSUVASTATIN CALCIUM 40 MG/1
40 TABLET, COATED ORAL DAILY
Qty: 90 TABLET | Refills: 3 | Status: SHIPPED | OUTPATIENT
Start: 2020-07-20 | End: 2021-07-13 | Stop reason: SDUPTHER

## 2020-07-22 ENCOUNTER — OFFICE VISIT (OUTPATIENT)
Dept: OPTOMETRY | Facility: CLINIC | Age: 75
End: 2020-07-22
Payer: COMMERCIAL

## 2020-07-22 DIAGNOSIS — H52.7 REFRACTIVE ERROR: ICD-10-CM

## 2020-07-22 DIAGNOSIS — Z96.1 PSEUDOPHAKIA OF BOTH EYES: ICD-10-CM

## 2020-07-22 DIAGNOSIS — I10 ESSENTIAL HYPERTENSION: Primary | ICD-10-CM

## 2020-07-22 PROCEDURE — 92015 PR REFRACTION: ICD-10-PCS | Mod: S$GLB,,, | Performed by: OPTOMETRIST

## 2020-07-22 PROCEDURE — 92014 COMPRE OPH EXAM EST PT 1/>: CPT | Mod: S$GLB,,, | Performed by: OPTOMETRIST

## 2020-07-22 PROCEDURE — 99999 PR PBB SHADOW E&M-EST. PATIENT-LVL III: CPT | Mod: PBBFAC,,, | Performed by: OPTOMETRIST

## 2020-07-22 PROCEDURE — 92014 PR EYE EXAM, EST PATIENT,COMPREHESV: ICD-10-PCS | Mod: S$GLB,,, | Performed by: OPTOMETRIST

## 2020-07-22 PROCEDURE — 99999 PR PBB SHADOW E&M-EST. PATIENT-LVL III: ICD-10-PCS | Mod: PBBFAC,,, | Performed by: OPTOMETRIST

## 2020-07-22 PROCEDURE — 92015 DETERMINE REFRACTIVE STATE: CPT | Mod: S$GLB,,, | Performed by: OPTOMETRIST

## 2020-07-22 NOTE — PROGRESS NOTES
PATEL JOSHI 05/2021  Glasses about 2 yrs. Old and still seem fine.  Patient   hasn't noticed any vision changes since last exam.  Not using any drops.     Last edited by Angie Quiñonez on 7/22/2020  2:26 PM. (History)            Assessment /Plan     For exam results, see Encounter Report.    Essential hypertension    Pseudophakia of both eyes    Refractive error      1. No HTN ret, Monitor condition. Patient to report any changes. RTC 1 year recheck.  2. Monitor condition. Patient to report any changes. RTC 1 year recheck.  3. New Spec Rx given. Different lens options discussed with patient. RTC 1 year full exam.

## 2020-07-24 ENCOUNTER — OFFICE VISIT (OUTPATIENT)
Dept: URGENT CARE | Facility: CLINIC | Age: 75
End: 2020-07-24
Payer: MEDICARE

## 2020-07-24 VITALS
HEART RATE: 74 BPM | BODY MASS INDEX: 25.16 KG/M2 | RESPIRATION RATE: 14 BRPM | TEMPERATURE: 98 F | WEIGHT: 151 LBS | OXYGEN SATURATION: 97 % | HEIGHT: 65 IN | DIASTOLIC BLOOD PRESSURE: 75 MMHG | SYSTOLIC BLOOD PRESSURE: 135 MMHG

## 2020-07-24 DIAGNOSIS — N30.00 ACUTE CYSTITIS WITHOUT HEMATURIA: Primary | ICD-10-CM

## 2020-07-24 LAB
BILIRUB UR QL STRIP: NEGATIVE
GLUCOSE UR QL STRIP: NEGATIVE
KETONES UR QL STRIP: NEGATIVE
LEUKOCYTE ESTERASE UR QL STRIP: POSITIVE
PH, POC UA: 6 (ref 5–8)
POC BLOOD, URINE: NEGATIVE
POC NITRATES, URINE: NEGATIVE
PROT UR QL STRIP: POSITIVE
SP GR UR STRIP: <=1.005 (ref 1–1.03)
UROBILINOGEN UR STRIP-ACNC: ABNORMAL (ref 0.1–1.1)

## 2020-07-24 PROCEDURE — 81003 POCT URINALYSIS, DIPSTICK, AUTOMATED, W/O SCOPE: ICD-10-PCS | Mod: QW,S$GLB,, | Performed by: STUDENT IN AN ORGANIZED HEALTH CARE EDUCATION/TRAINING PROGRAM

## 2020-07-24 PROCEDURE — 99214 PR OFFICE/OUTPT VISIT, EST, LEVL IV, 30-39 MIN: ICD-10-PCS | Mod: 25,S$GLB,, | Performed by: STUDENT IN AN ORGANIZED HEALTH CARE EDUCATION/TRAINING PROGRAM

## 2020-07-24 PROCEDURE — 81003 URINALYSIS AUTO W/O SCOPE: CPT | Mod: QW,S$GLB,, | Performed by: STUDENT IN AN ORGANIZED HEALTH CARE EDUCATION/TRAINING PROGRAM

## 2020-07-24 PROCEDURE — 99214 OFFICE O/P EST MOD 30 MIN: CPT | Mod: 25,S$GLB,, | Performed by: STUDENT IN AN ORGANIZED HEALTH CARE EDUCATION/TRAINING PROGRAM

## 2020-07-24 PROCEDURE — 87086 URINE CULTURE/COLONY COUNT: CPT | Mod: HCNC

## 2020-07-24 RX ORDER — CEPHALEXIN 500 MG/1
500 CAPSULE ORAL EVERY 8 HOURS
Qty: 21 CAPSULE | Refills: 0 | Status: SHIPPED | OUTPATIENT
Start: 2020-07-24 | End: 2020-07-31
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
## 2020-07-24 NOTE — PATIENT INSTRUCTIONS

## 2020-07-26 LAB — BACTERIA UR CULT: NORMAL

## 2020-07-29 RX ORDER — ALPRAZOLAM 0.25 MG/1
TABLET ORAL
Qty: 20 TABLET | Refills: 0 | Status: SHIPPED | OUTPATIENT
Start: 2020-07-29 | End: 2020-08-25

## 2020-08-24 ENCOUNTER — LAB VISIT (OUTPATIENT)
Dept: LAB | Facility: HOSPITAL | Age: 75
End: 2020-08-24
Attending: INTERNAL MEDICINE
Payer: MEDICARE

## 2020-08-24 DIAGNOSIS — E78.00 HYPERCHOLESTEREMIA: ICD-10-CM

## 2020-08-24 LAB
ALBUMIN SERPL BCP-MCNC: 3.6 G/DL (ref 3.5–5.2)
ALP SERPL-CCNC: 62 U/L (ref 55–135)
ALT SERPL W/O P-5'-P-CCNC: 16 U/L (ref 10–44)
ANION GAP SERPL CALC-SCNC: 9 MMOL/L (ref 8–16)
AST SERPL-CCNC: 15 U/L (ref 10–40)
BILIRUB SERPL-MCNC: 0.5 MG/DL (ref 0.1–1)
BUN SERPL-MCNC: 9 MG/DL (ref 8–23)
CALCIUM SERPL-MCNC: 9.4 MG/DL (ref 8.7–10.5)
CHLORIDE SERPL-SCNC: 101 MMOL/L (ref 95–110)
CHOLEST SERPL-MCNC: 158 MG/DL (ref 120–199)
CHOLEST/HDLC SERPL: 2.1 {RATIO} (ref 2–5)
CO2 SERPL-SCNC: 26 MMOL/L (ref 23–29)
CREAT SERPL-MCNC: 0.8 MG/DL (ref 0.5–1.4)
EST. GFR  (AFRICAN AMERICAN): >60 ML/MIN/1.73 M^2
EST. GFR  (NON AFRICAN AMERICAN): >60 ML/MIN/1.73 M^2
GLUCOSE SERPL-MCNC: 81 MG/DL (ref 70–110)
HDLC SERPL-MCNC: 76 MG/DL (ref 40–75)
HDLC SERPL: 48.1 % (ref 20–50)
LDLC SERPL CALC-MCNC: 63.4 MG/DL (ref 63–159)
NONHDLC SERPL-MCNC: 82 MG/DL
POTASSIUM SERPL-SCNC: 4.1 MMOL/L (ref 3.5–5.1)
PROT SERPL-MCNC: 6.5 G/DL (ref 6–8.4)
SODIUM SERPL-SCNC: 136 MMOL/L (ref 136–145)
TRIGL SERPL-MCNC: 93 MG/DL (ref 30–150)

## 2020-08-24 PROCEDURE — 80053 COMPREHEN METABOLIC PANEL: CPT | Mod: HCNC

## 2020-08-24 PROCEDURE — 80061 LIPID PANEL: CPT | Mod: HCNC

## 2020-08-24 PROCEDURE — 36415 COLL VENOUS BLD VENIPUNCTURE: CPT | Mod: HCNC,PO

## 2020-08-25 RX ORDER — ALPRAZOLAM 0.25 MG/1
TABLET ORAL
Qty: 20 TABLET | Refills: 0 | Status: SHIPPED | OUTPATIENT
Start: 2020-08-25 | End: 2020-09-25

## 2020-08-27 ENCOUNTER — OFFICE VISIT (OUTPATIENT)
Dept: INTERNAL MEDICINE | Facility: CLINIC | Age: 75
End: 2020-08-27
Payer: MEDICARE

## 2020-08-27 VITALS
DIASTOLIC BLOOD PRESSURE: 92 MMHG | OXYGEN SATURATION: 98 % | HEART RATE: 62 BPM | TEMPERATURE: 98 F | RESPIRATION RATE: 14 BRPM | HEIGHT: 65 IN | SYSTOLIC BLOOD PRESSURE: 148 MMHG | BODY MASS INDEX: 24.68 KG/M2 | WEIGHT: 148.13 LBS

## 2020-08-27 DIAGNOSIS — A05.9 FOOD POISONING: Primary | ICD-10-CM

## 2020-08-27 DIAGNOSIS — R11.0 NAUSEA: ICD-10-CM

## 2020-08-27 PROCEDURE — 96372 PR INJECTION,THERAP/PROPH/DIAG2ST, IM OR SUBCUT: ICD-10-PCS | Mod: HCNC,S$GLB,, | Performed by: FAMILY MEDICINE

## 2020-08-27 PROCEDURE — 1126F AMNT PAIN NOTED NONE PRSNT: CPT | Mod: HCNC,S$GLB,, | Performed by: FAMILY MEDICINE

## 2020-08-27 PROCEDURE — 99999 PR PBB SHADOW E&M-EST. PATIENT-LVL IV: ICD-10-PCS | Mod: PBBFAC,HCNC,, | Performed by: FAMILY MEDICINE

## 2020-08-27 PROCEDURE — 3080F PR MOST RECENT DIASTOLIC BLOOD PRESSURE >= 90 MM HG: ICD-10-PCS | Mod: HCNC,CPTII,S$GLB, | Performed by: FAMILY MEDICINE

## 2020-08-27 PROCEDURE — 1126F PR PAIN SEVERITY QUANTIFIED, NO PAIN PRESENT: ICD-10-PCS | Mod: HCNC,S$GLB,, | Performed by: FAMILY MEDICINE

## 2020-08-27 PROCEDURE — 99214 OFFICE O/P EST MOD 30 MIN: CPT | Mod: HCNC,25,S$GLB, | Performed by: FAMILY MEDICINE

## 2020-08-27 PROCEDURE — 1159F MED LIST DOCD IN RCRD: CPT | Mod: HCNC,S$GLB,, | Performed by: FAMILY MEDICINE

## 2020-08-27 PROCEDURE — 96372 THER/PROPH/DIAG INJ SC/IM: CPT | Mod: HCNC,S$GLB,, | Performed by: FAMILY MEDICINE

## 2020-08-27 PROCEDURE — 3077F PR MOST RECENT SYSTOLIC BLOOD PRESSURE >= 140 MM HG: ICD-10-PCS | Mod: HCNC,CPTII,S$GLB, | Performed by: FAMILY MEDICINE

## 2020-08-27 PROCEDURE — 1101F PT FALLS ASSESS-DOCD LE1/YR: CPT | Mod: HCNC,CPTII,S$GLB, | Performed by: FAMILY MEDICINE

## 2020-08-27 PROCEDURE — 1101F PR PT FALLS ASSESS DOC 0-1 FALLS W/OUT INJ PAST YR: ICD-10-PCS | Mod: HCNC,CPTII,S$GLB, | Performed by: FAMILY MEDICINE

## 2020-08-27 PROCEDURE — 99214 PR OFFICE/OUTPT VISIT, EST, LEVL IV, 30-39 MIN: ICD-10-PCS | Mod: HCNC,25,S$GLB, | Performed by: FAMILY MEDICINE

## 2020-08-27 PROCEDURE — 99999 PR PBB SHADOW E&M-EST. PATIENT-LVL IV: CPT | Mod: PBBFAC,HCNC,, | Performed by: FAMILY MEDICINE

## 2020-08-27 PROCEDURE — 3077F SYST BP >= 140 MM HG: CPT | Mod: HCNC,CPTII,S$GLB, | Performed by: FAMILY MEDICINE

## 2020-08-27 PROCEDURE — 1159F PR MEDICATION LIST DOCUMENTED IN MEDICAL RECORD: ICD-10-PCS | Mod: HCNC,S$GLB,, | Performed by: FAMILY MEDICINE

## 2020-08-27 PROCEDURE — 3080F DIAST BP >= 90 MM HG: CPT | Mod: HCNC,CPTII,S$GLB, | Performed by: FAMILY MEDICINE

## 2020-08-27 RX ORDER — PROMETHAZINE HYDROCHLORIDE 25 MG/ML
25 INJECTION, SOLUTION INTRAMUSCULAR; INTRAVENOUS
Status: COMPLETED | OUTPATIENT
Start: 2020-08-27 | End: 2020-08-27

## 2020-08-27 RX ORDER — OMEPRAZOLE 40 MG/1
CAPSULE, DELAYED RELEASE ORAL
COMMUNITY
Start: 2020-08-18 | End: 2022-12-08 | Stop reason: ALTCHOICE

## 2020-08-27 RX ORDER — LEVOFLOXACIN 500 MG/1
TABLET, FILM COATED ORAL
COMMUNITY
Start: 2020-08-21 | End: 2020-10-30 | Stop reason: ALTCHOICE

## 2020-08-27 RX ADMIN — PROMETHAZINE HYDROCHLORIDE 25 MG: 25 INJECTION, SOLUTION INTRAMUSCULAR; INTRAVENOUS at 09:08

## 2020-08-27 NOTE — PATIENT INSTRUCTIONS
Food Poisoning (Adult)  Food poisoning is illness that is passed along in food. It usually occurs from 1 to 24 hours after eating food that has spoiled. Food poisoning can occur when you eat food or drink that contains viruses, bacteria, parasites, or toxins. This includes food that has not been cooked or refrigerated properly.  Food poisoning can cause these symptoms:  · Abdominal pain and cramping  · Nausea  · Vomiting  · Diarrhea  · Fever and chills  · Fatigue  The symptoms usually last from 1 to 2 days.  Antibiotics are not effective for this illness.  Home care  Follow all instructions given by your healthcare provider. Rest at home for the next 24 hours, or until you feel better. Avoid caffeine, tobacco, and alcohol. These can make diarrhea, cramping, and pain worse.  If taking medicines:  · Dont take over-the-counter diarrhea or nausea medicines unless your healthcare provider tells you to.  · You may be given medicine for nausea or vomiting to help keep down fluids. Take these medicines as prescribed.  · You may use acetaminophen or NSAID medicine like ibuprofen or naproxen to reduce pain and fever. Dont use these if you have chronic liver or kidney disease, or ever had a stomach ulcer or gastrointestinal bleeding. Talk with your healthcare provider first. Don't use NSAID medicines if you are already taking one for another condition (like arthritis) or are on aspirin (such as for heart disease or after a stroke).  To prevent the spread of illness:  · Remember that washing with soap and water or using alcohol-based  is the best way to prevent the spread of infection.  · Clean the toilet after each use.  · Wash your hands before eating.  · Wash your hands or use alcohol-based  before and after preparing food. Keep in mind that people with diarrhea or vomiting should not prepare food for others.  · Wash your hands after using cutting boards, counter-tops, and knives or other utensils that  have been in contact with raw foods.  · Wash and then peel fruits and vegetables.  · Keep uncooked meats away from cooked and ready-to-eat foods.  · Use a food thermometer when cooking. Cook poultry to at least 165°F (74°C). Cook ground meat (beef, veal, pork, lamb) to at least 160°F (71°C). Cook fresh beef, veal, lamb, and pork to at least 145°F (63°C).  · Dont eat raw or undercooked eggs (poached or austin side up), poultry, meat or unpasteurized milk and juices.  · Do not eat foods requiring refrigeration. Don't eat foods that have not been refrigerated for long periods such as at buffets or picnics.  · Do not eat seafood that is undercooked or with high rates of food toxins.  Food and drinks  The main goal while treating vomiting or diarrhea is to prevent dehydration. This is done by taking small amounts of liquids often.  · Keep in mind that liquids are more important than food right now.  · Drink only small amounts of liquids at a time.  · Dont force yourself to eat, especially if you are having cramping, vomiting, or diarrhea. Dont eat large amounts at a time, even if you are hungry.  · If you eat, avoid fatty, greasy, spicy, or fried foods.  · Dont eat dairy foods or drink milk if you have diarrhea. These can make diarrhea worse.  The first 24 hours you can try:  · Soft drinks without caffeine  · Ginger ale  · Water (plain or flavored)  · Decaf tea or coffee  · Clear broth, consommé, or bouillon  · Gelatin, popsicles, or frozen fruit juice bars  If you are very dehydrated, sports drinks are not a good choice. They have too much sugar and not enough electrolytes. In this case, commercially available products called oral rehydration solutions are best.  The second 24 hours, if you are feeling better, you can add:  · Hot cereal, plain toast, bread, rolls, or crackers  · Plain noodles, rice, mashed potatoes, chicken noodle soup, or rice soup  · Unsweetened canned fruit (no pineapple)  · Bananas  As you  recover:  · Limit fat intake to less than 15 grams per day. Dont eat margarine, butter, oils, mayonnaise, sauces, gravies, fried foods, peanut butter, meat, poultry, or fish.  · Limit fiber. Dont eat raw or cooked vegetables, fresh fruits except bananas, and bran cereals.  · Limit caffeine and chocolate.  · Dont use spices or seasonings except salt.  · Resume a normal diet over time, as you feel better and your symptoms improve.  · If the symptoms come back, go back to a simple diet or clear liquids.  Follow-up care  Follow up with your healthcare provider, or as advised. If a stool sample was taken or cultures were done, call the healthcare provider for the results as instructed.  Call 911  Call 911 if you have any of these symptoms:  · Trouble breathing  · Chest pain  · Confusion  · Extreme drowsiness or trouble walking  · Loss of consciousness  · Rapid heart rate  · Stiff neck  · Seizure  When to seek medical advice  Call your health care provider right away if any of these occur:  · Abdominal pain that gets worse  · Constant lower right abdominal pain  · Continued vomiting and inability to keep liquids down  · Diarrhea more than 5 times a day  · Blood in vomit or stool  · Dark urine or no urine for 8 hours, dry mouth and tongue, tiredness, weakness, or dizziness  · New rash  · You dont get better in 2 to 3 days  · Fever of 100.4°F (38°C) or higher that doesnt get lower with medicine  Date Last Reviewed: 1/3/2016  © 7126-7819 [employer]. [address] [place] [address], [place] [address]. All rights reserved. This information is not intended as a substitute for professional medical care. Always follow your healthcare professional's instructions.

## 2020-08-27 NOTE — PROGRESS NOTES
Subjective:       Patient ID: Nesha Landa is a 74 y.o. female.    Chief Complaint: Diarrhea (x 2 episodes), Nausea (Began last night, pt state ate [place]. Pt state taking Levofloxacin), and Fall (x1)    HPI 74-year-old white female patient of Dr. Angeles but new patient to me presents to clinic today secondary to a complaint of nausea, dry heaving, and diarrhea since yesterday evening.  She is currently on Levaquin prescribed by her ENT and has had previous episodes of nausea associated with Cipro use.  She does take probiotics daily.  She denies any nausea until last night.  She reports that she ate salmon yesterday for lunch.  Later in the evening she began noticing abdominal cramps, nausea, and 2 episodes of loose stools.  She has taken Zofran with slight using of the nausea but continues to note a gurgling sensation to her stomach.  She denies any fever, chills, or body aches.  Review of Systems   Constitutional: Negative for appetite change, chills, fatigue and fever.   HENT: Negative for nasal congestion, ear pain, hearing loss, postnasal drip, rhinorrhea, sinus pressure/congestion, sore throat and tinnitus.    Eyes: Negative for redness, itching and visual disturbance.   Respiratory: Negative for cough, chest tightness and shortness of breath.    Cardiovascular: Negative for chest pain and palpitations.   Gastrointestinal: Positive for diarrhea, nausea and vomiting (dry heaving). Negative for abdominal pain and constipation.   Genitourinary: Negative for decreased urine volume, difficulty urinating, dysuria, frequency, hematuria and urgency.   Musculoskeletal: Negative for back pain, myalgias, neck pain and neck stiffness.   Integumentary:  Negative for rash.   Neurological: Negative for dizziness, light-headedness and headaches.   Psychiatric/Behavioral: Negative.          Objective:      Physical Exam  Vitals signs and nursing note reviewed.   Constitutional:       General: She is not in acute distress.      Appearance: She is well-developed. She is not diaphoretic.   HENT:      Head: Normocephalic and atraumatic.      Right Ear: External ear normal.      Left Ear: External ear normal.      Nose: Nose normal.      Mouth/Throat:      Pharynx: No oropharyngeal exudate.   Eyes:      General: No scleral icterus.        Right eye: No discharge.         Left eye: No discharge.      Conjunctiva/sclera: Conjunctivae normal.      Pupils: Pupils are equal, round, and reactive to light.   Neck:      Musculoskeletal: Normal range of motion and neck supple.      Thyroid: No thyromegaly.      Vascular: No JVD.      Trachea: No tracheal deviation.   Cardiovascular:      Rate and Rhythm: Normal rate and regular rhythm.      Heart sounds: Normal heart sounds. No murmur. No friction rub. No gallop.    Pulmonary:      Effort: Pulmonary effort is normal. No respiratory distress.      Breath sounds: Normal breath sounds. No stridor. No wheezing or rales.   Abdominal:      General: Bowel sounds are normal. There is no distension.      Palpations: Abdomen is soft. There is no mass.      Tenderness: There is no abdominal tenderness. There is no guarding or rebound.   Musculoskeletal: Normal range of motion.         General: No tenderness.   Lymphadenopathy:      Cervical: No cervical adenopathy.   Skin:     General: Skin is warm and dry.      Coloration: Skin is not pale.      Findings: No erythema or rash.   Neurological:      Mental Status: She is alert and oriented to person, place, and time.   Psychiatric:         Behavior: Behavior normal.         Thought Content: Thought content normal.         Judgment: Judgment normal.         Assessment:       1. Food poisoning    2. Nausea        Plan:         1.  Phenergan 25 mg IM now.  2.  Continue Zofran as needed for nausea.  3.  Imodium as needed for diarrhea.  4.  Encourage hydration.  5.  Return to clinic as needed if symptoms persist or worsen.

## 2020-09-14 ENCOUNTER — TELEPHONE (OUTPATIENT)
Dept: INTERNAL MEDICINE | Facility: CLINIC | Age: 75
End: 2020-09-14

## 2020-09-14 DIAGNOSIS — E78.00 HYPERCHOLESTEREMIA: ICD-10-CM

## 2020-09-14 DIAGNOSIS — Z00.00 ROUTINE GENERAL MEDICAL EXAMINATION AT A HEALTH CARE FACILITY: Primary | ICD-10-CM

## 2020-09-14 DIAGNOSIS — I10 ESSENTIAL HYPERTENSION: ICD-10-CM

## 2020-09-14 NOTE — TELEPHONE ENCOUNTER
----- Message from Geneva Cortes sent at 9/14/2020  3:01 PM CDT -----  Contact: self [phone]  Doctor appointment and lab have been scheduled.  Please link lab orders to the lab appointment.  Date of doctor appointment:  11/25  Date of lab appointment:  11/19  Physical or EP: physical  Comments:

## 2020-09-25 RX ORDER — ALPRAZOLAM 0.25 MG/1
TABLET ORAL
Qty: 20 TABLET | Refills: 0 | Status: SHIPPED | OUTPATIENT
Start: 2020-09-25 | End: 2020-12-07 | Stop reason: SDUPTHER

## 2020-10-14 ENCOUNTER — TELEPHONE (OUTPATIENT)
Dept: INTERNAL MEDICINE | Facility: CLINIC | Age: 75
End: 2020-10-14

## 2020-10-14 RX ORDER — PROMETHAZINE HYDROCHLORIDE 25 MG/1
TABLET ORAL
Qty: 20 TABLET | Refills: 0 | Status: SHIPPED | OUTPATIENT
Start: 2020-10-14 | End: 2020-10-27 | Stop reason: SDUPTHER

## 2020-10-14 NOTE — TELEPHONE ENCOUNTER
----- Message from Gracie Krupa sent at 10/14/2020  2:29 PM CDT -----  Contact: Pt [phone]  Requesting an RX refill or new RX.  Is this a refill or new RX:  refill  RX name and strength: promethazine (PHENERGAN) 25 MG tablet  Is this a 30 day or 90 day RX:  20  Pharmacy name and phone # (copy/paste from chart):   [id number] [place] [address] [address]  AT [facility] OF [address] [phone] (Phone)  [phone] (Fax)      Comments:  Patient has been calling for 3 days

## 2020-10-27 ENCOUNTER — LAB VISIT (OUTPATIENT)
Dept: LAB | Facility: HOSPITAL | Age: 75
End: 2020-10-27
Attending: INTERNAL MEDICINE
Payer: MEDICARE

## 2020-10-27 ENCOUNTER — OFFICE VISIT (OUTPATIENT)
Dept: INTERNAL MEDICINE | Facility: CLINIC | Age: 75
End: 2020-10-27
Payer: MEDICARE

## 2020-10-27 VITALS
HEIGHT: 65 IN | RESPIRATION RATE: 20 BRPM | WEIGHT: 144.81 LBS | TEMPERATURE: 98 F | HEART RATE: 68 BPM | SYSTOLIC BLOOD PRESSURE: 120 MMHG | BODY MASS INDEX: 24.12 KG/M2 | DIASTOLIC BLOOD PRESSURE: 82 MMHG

## 2020-10-27 DIAGNOSIS — E78.00 HYPERCHOLESTEREMIA: ICD-10-CM

## 2020-10-27 DIAGNOSIS — R10.9 ABDOMINAL CRAMPING: ICD-10-CM

## 2020-10-27 DIAGNOSIS — R11.0 NAUSEA: Primary | ICD-10-CM

## 2020-10-27 DIAGNOSIS — I10 ESSENTIAL HYPERTENSION: ICD-10-CM

## 2020-10-27 LAB
ALBUMIN SERPL BCP-MCNC: 4 G/DL (ref 3.5–5.2)
ALP SERPL-CCNC: 69 U/L (ref 55–135)
ALT SERPL W/O P-5'-P-CCNC: 15 U/L (ref 10–44)
ANION GAP SERPL CALC-SCNC: 11 MMOL/L (ref 8–16)
AST SERPL-CCNC: 22 U/L (ref 10–40)
BASOPHILS # BLD AUTO: 0.01 K/UL (ref 0–0.2)
BASOPHILS NFR BLD: 0.1 % (ref 0–1.9)
BILIRUB SERPL-MCNC: 0.5 MG/DL (ref 0.1–1)
BUN SERPL-MCNC: 10 MG/DL (ref 8–23)
CALCIUM SERPL-MCNC: 10.5 MG/DL (ref 8.7–10.5)
CHLORIDE SERPL-SCNC: 100 MMOL/L (ref 95–110)
CO2 SERPL-SCNC: 27 MMOL/L (ref 23–29)
CREAT SERPL-MCNC: 1 MG/DL (ref 0.5–1.4)
DIFFERENTIAL METHOD: ABNORMAL
EOSINOPHIL # BLD AUTO: 0.1 K/UL (ref 0–0.5)
EOSINOPHIL NFR BLD: 1.5 % (ref 0–8)
ERYTHROCYTE [DISTWIDTH] IN BLOOD BY AUTOMATED COUNT: 12.8 % (ref 11.5–14.5)
EST. GFR  (AFRICAN AMERICAN): >60 ML/MIN/1.73 M^2
EST. GFR  (NON AFRICAN AMERICAN): 54.5 ML/MIN/1.73 M^2
GLUCOSE SERPL-MCNC: 100 MG/DL (ref 70–110)
HCT VFR BLD AUTO: 43 % (ref 37–48.5)
HGB BLD-MCNC: 14 G/DL (ref 12–16)
IMM GRANULOCYTES # BLD AUTO: 0.03 K/UL (ref 0–0.04)
IMM GRANULOCYTES NFR BLD AUTO: 0.4 % (ref 0–0.5)
LYMPHOCYTES # BLD AUTO: 1.7 K/UL (ref 1–4.8)
LYMPHOCYTES NFR BLD: 19.9 % (ref 18–48)
MCH RBC QN AUTO: 32.1 PG (ref 27–31)
MCHC RBC AUTO-ENTMCNC: 32.6 G/DL (ref 32–36)
MCV RBC AUTO: 99 FL (ref 82–98)
MONOCYTES # BLD AUTO: 0.6 K/UL (ref 0.3–1)
MONOCYTES NFR BLD: 7.4 % (ref 4–15)
NEUTROPHILS # BLD AUTO: 6 K/UL (ref 1.8–7.7)
NEUTROPHILS NFR BLD: 70.7 % (ref 38–73)
NRBC BLD-RTO: 0 /100 WBC
PLATELET # BLD AUTO: 221 K/UL (ref 150–350)
PMV BLD AUTO: 11.7 FL (ref 9.2–12.9)
POTASSIUM SERPL-SCNC: 3.7 MMOL/L (ref 3.5–5.1)
PROT SERPL-MCNC: 7.3 G/DL (ref 6–8.4)
RBC # BLD AUTO: 4.36 M/UL (ref 4–5.4)
SODIUM SERPL-SCNC: 138 MMOL/L (ref 136–145)
TSH SERPL DL<=0.005 MIU/L-ACNC: 2.05 UIU/ML (ref 0.4–4)
WBC # BLD AUTO: 8.48 K/UL (ref 3.9–12.7)

## 2020-10-27 PROCEDURE — 1159F MED LIST DOCD IN RCRD: CPT | Mod: HCNC,S$GLB,, | Performed by: INTERNAL MEDICINE

## 2020-10-27 PROCEDURE — 85025 COMPLETE CBC W/AUTO DIFF WBC: CPT | Mod: HCNC

## 2020-10-27 PROCEDURE — 1101F PR PT FALLS ASSESS DOC 0-1 FALLS W/OUT INJ PAST YR: ICD-10-PCS | Mod: HCNC,CPTII,S$GLB, | Performed by: INTERNAL MEDICINE

## 2020-10-27 PROCEDURE — 3288F FALL RISK ASSESSMENT DOCD: CPT | Mod: HCNC,CPTII,S$GLB, | Performed by: INTERNAL MEDICINE

## 2020-10-27 PROCEDURE — 84443 ASSAY THYROID STIM HORMONE: CPT | Mod: HCNC

## 2020-10-27 PROCEDURE — 1159F PR MEDICATION LIST DOCUMENTED IN MEDICAL RECORD: ICD-10-PCS | Mod: HCNC,S$GLB,, | Performed by: INTERNAL MEDICINE

## 2020-10-27 PROCEDURE — 3074F PR MOST RECENT SYSTOLIC BLOOD PRESSURE < 130 MM HG: ICD-10-PCS | Mod: HCNC,CPTII,S$GLB, | Performed by: INTERNAL MEDICINE

## 2020-10-27 PROCEDURE — 99999 PR PBB SHADOW E&M-EST. PATIENT-LVL V: ICD-10-PCS | Mod: PBBFAC,HCNC,, | Performed by: INTERNAL MEDICINE

## 2020-10-27 PROCEDURE — 99999 PR PBB SHADOW E&M-EST. PATIENT-LVL V: CPT | Mod: PBBFAC,HCNC,, | Performed by: INTERNAL MEDICINE

## 2020-10-27 PROCEDURE — 1126F AMNT PAIN NOTED NONE PRSNT: CPT | Mod: HCNC,S$GLB,, | Performed by: INTERNAL MEDICINE

## 2020-10-27 PROCEDURE — 3288F PR FALLS RISK ASSESSMENT DOCUMENTED: ICD-10-PCS | Mod: HCNC,CPTII,S$GLB, | Performed by: INTERNAL MEDICINE

## 2020-10-27 PROCEDURE — 3079F PR MOST RECENT DIASTOLIC BLOOD PRESSURE 80-89 MM HG: ICD-10-PCS | Mod: HCNC,CPTII,S$GLB, | Performed by: INTERNAL MEDICINE

## 2020-10-27 PROCEDURE — 1101F PT FALLS ASSESS-DOCD LE1/YR: CPT | Mod: HCNC,CPTII,S$GLB, | Performed by: INTERNAL MEDICINE

## 2020-10-27 PROCEDURE — 3079F DIAST BP 80-89 MM HG: CPT | Mod: HCNC,CPTII,S$GLB, | Performed by: INTERNAL MEDICINE

## 2020-10-27 PROCEDURE — 99213 OFFICE O/P EST LOW 20 MIN: CPT | Mod: HCNC,S$GLB,, | Performed by: INTERNAL MEDICINE

## 2020-10-27 PROCEDURE — 80053 COMPREHEN METABOLIC PANEL: CPT | Mod: HCNC

## 2020-10-27 PROCEDURE — 99213 PR OFFICE/OUTPT VISIT, EST, LEVL III, 20-29 MIN: ICD-10-PCS | Mod: HCNC,S$GLB,, | Performed by: INTERNAL MEDICINE

## 2020-10-27 PROCEDURE — 36415 COLL VENOUS BLD VENIPUNCTURE: CPT | Mod: HCNC,PO

## 2020-10-27 PROCEDURE — 1126F PR PAIN SEVERITY QUANTIFIED, NO PAIN PRESENT: ICD-10-PCS | Mod: HCNC,S$GLB,, | Performed by: INTERNAL MEDICINE

## 2020-10-27 PROCEDURE — 3074F SYST BP LT 130 MM HG: CPT | Mod: HCNC,CPTII,S$GLB, | Performed by: INTERNAL MEDICINE

## 2020-10-27 RX ORDER — PROMETHAZINE HYDROCHLORIDE 25 MG/1
TABLET ORAL
Qty: 20 TABLET | Refills: 2 | Status: SHIPPED | OUTPATIENT
Start: 2020-10-27 | End: 2020-12-04 | Stop reason: SDUPTHER

## 2020-10-27 NOTE — PROGRESS NOTES
History of present illness:  [age] year lady with hypertension dyslipidemia is in today with GI symptomatologies.  The patient reports developing some nausea slight abdominal cramping several days ago.  No vomiting.  No overt diarrhea.  No constipation.  No melena no hematochezia.  No fever no chills.  No respiratory symptomatologies.  No recent travel.  No other family members with same.    Current medications:  All medications are noted and reviewed in the electronic medical record medication list.    Review of systems:  Constitutional:  No fever no chills.  Cardiovascular:  No chest pain palpitations syncope edema.  Respiratory:  No cough shortness of breath.  :  No dysuria frequency change in the color or character of her urine.    Past medical history, past surgical history, family medical history social history is are all noted and reviewed in our electronic medical record history sections.    Physical examination:  General:  Pleasant alert appropriately groomed lady no acute distress.  Vital signs:  All noted and reviewed as normal.  Eyes:  Sclerae white and nonicteric.  HEENT:  Normocephalic.  Neck supple no masses no thyromegaly.  Lungs:  Clear to auscultation.  Cardiovascular:  Regular rhythm.  No significant murmur.  GI:  The abdomen is soft benign no masses no tenderness no organomegaly.    Impression:  Several days of GI symptomatologies nonspecific.  Relatively mild symptomatology.    Plan:  Check CBC, chemistry profile urinalysis.  If negative will proceed with abdominal ultrasound.

## 2020-10-28 ENCOUNTER — TELEPHONE (OUTPATIENT)
Dept: INTERNAL MEDICINE | Facility: CLINIC | Age: 75
End: 2020-10-28

## 2020-10-28 DIAGNOSIS — R11.0 NAUSEA: Primary | ICD-10-CM

## 2020-10-28 NOTE — TELEPHONE ENCOUNTER
Spoke with pt to advise of results and MD recommendations to f/u with abd u/s.    Verbalized understanding.    Message sent to the referral coord to contact the pt to schedule abd u/s.

## 2020-10-28 NOTE — TELEPHONE ENCOUNTER
----- Message from Scotty Regan sent at 10/28/2020  8:19 AM CDT -----  Regarding: Test results  Contact: Patient [phone]  Patient would like to get test results.  Name of test (lab, mammo, etc.):  Lab   Date of test:  10/27/20  Ordering provider:   Where was the test performed:  [facility]  Comments:      Please call an advise  Thank you

## 2020-10-29 ENCOUNTER — HOSPITAL ENCOUNTER (OUTPATIENT)
Dept: RADIOLOGY | Facility: HOSPITAL | Age: 75
Discharge: HOME | End: 2020-10-29
Attending: INTERNAL MEDICINE
Payer: MEDICARE

## 2020-10-29 DIAGNOSIS — R11.0 NAUSEA: ICD-10-CM

## 2020-10-29 PROCEDURE — 76700 US EXAM ABDOM COMPLETE: CPT | Mod: 26,HCNC,, | Performed by: RADIOLOGY

## 2020-10-29 PROCEDURE — 76700 US ABDOMEN COMPLETE: ICD-10-PCS | Mod: 26,HCNC,, | Performed by: RADIOLOGY

## 2020-10-29 PROCEDURE — 76700 US EXAM ABDOM COMPLETE: CPT | Mod: TC,HCNC

## 2020-10-30 ENCOUNTER — TELEPHONE (OUTPATIENT)
Dept: INTERNAL MEDICINE | Facility: CLINIC | Age: 75
End: 2020-10-30

## 2020-10-30 RX ORDER — NITROFURANTOIN 25; 75 MG/1; MG/1
100 CAPSULE ORAL 2 TIMES DAILY
Qty: 14 CAPSULE | Refills: 0 | Status: SHIPPED | OUTPATIENT
Start: 2020-10-30 | End: 2021-10-08

## 2020-10-30 NOTE — TELEPHONE ENCOUNTER
----- Message from Madi Wright sent at 10/29/2020  2:08 PM CDT -----  Regarding: Pt Advice  Contact: DANY JONES [[id number]]  Name of Who is Calling: DANY JONES [[id number]]      What is the request in detail: Would like to speak with staff in regards to having a UTI. States she has painful urination and not a lot of urine. Please advise      Can the clinic reply by [facility]: no      What Number to Call Back [facility]: [phone]

## 2020-10-30 NOTE — TELEPHONE ENCOUNTER
Attempted to contact the pt to advise that a rx has been sent to her pharmacy.    No answer and unable to leave a voicemail.

## 2020-11-03 ENCOUNTER — TELEPHONE (OUTPATIENT)
Dept: INTERNAL MEDICINE | Facility: CLINIC | Age: 75
End: 2020-11-03

## 2020-11-03 NOTE — TELEPHONE ENCOUNTER
Spoke with pt to advise of results.    Verbalized understanding and states that her symptoms have seemed to subside.    She will call if symptoms recur.

## 2020-11-03 NOTE — TELEPHONE ENCOUNTER
----- Message from Cari Coates sent at 11/3/2020  9:08 AM CST -----  Contact: self [phone]  Calling to get test results.  Name of test (lab, x-ray): ultrasound  Date of test: 10/29  [facility]  Comments:

## 2020-11-07 ENCOUNTER — OFFICE VISIT (OUTPATIENT)
Dept: URGENT CARE | Facility: CLINIC | Age: 75
End: 2020-11-07
Payer: MEDICARE

## 2020-11-17 ENCOUNTER — TELEPHONE (OUTPATIENT)
Dept: INTERNAL MEDICINE | Facility: CLINIC | Age: 75
End: 2020-11-17

## 2020-11-17 DIAGNOSIS — N39.0 URINARY TRACT INFECTION WITHOUT HEMATURIA, SITE UNSPECIFIED: Primary | ICD-10-CM

## 2020-11-17 NOTE — TELEPHONE ENCOUNTER
----- Message from Che Parikh sent at 11/17/2020 12:02 PM CST -----  Contact: Self   Pt is requesting orders for urine test to be taken with her blood work on 11/19. Please advise

## 2020-11-19 ENCOUNTER — LAB VISIT (OUTPATIENT)
Dept: LAB | Facility: HOSPITAL | Age: 75
End: 2020-11-19
Attending: INTERNAL MEDICINE
Payer: MEDICARE

## 2020-11-19 DIAGNOSIS — E78.00 HYPERCHOLESTEREMIA: ICD-10-CM

## 2020-11-19 DIAGNOSIS — I10 ESSENTIAL HYPERTENSION: ICD-10-CM

## 2020-11-19 LAB
CHOLEST SERPL-MCNC: 155 MG/DL (ref 120–199)
CHOLEST/HDLC SERPL: 2.3 {RATIO} (ref 2–5)
HDLC SERPL-MCNC: 68 MG/DL (ref 40–75)
HDLC SERPL: 43.9 % (ref 20–50)
LDLC SERPL CALC-MCNC: 68.2 MG/DL (ref 63–159)
NONHDLC SERPL-MCNC: 87 MG/DL
TRIGL SERPL-MCNC: 94 MG/DL (ref 30–150)

## 2020-11-19 PROCEDURE — 36415 COLL VENOUS BLD VENIPUNCTURE: CPT | Mod: HCNC,PO

## 2020-11-19 PROCEDURE — 80061 LIPID PANEL: CPT | Mod: HCNC

## 2020-11-25 ENCOUNTER — OFFICE VISIT (OUTPATIENT)
Dept: INTERNAL MEDICINE | Facility: CLINIC | Age: 75
End: 2020-11-25
Payer: MEDICARE

## 2020-11-25 VITALS
SYSTOLIC BLOOD PRESSURE: 96 MMHG | BODY MASS INDEX: 23.66 KG/M2 | HEART RATE: 60 BPM | TEMPERATURE: 98 F | HEIGHT: 65 IN | DIASTOLIC BLOOD PRESSURE: 60 MMHG | WEIGHT: 142 LBS

## 2020-11-25 DIAGNOSIS — I25.10 CORONARY ARTERY DISEASE DUE TO CALCIFIED CORONARY LESION: ICD-10-CM

## 2020-11-25 DIAGNOSIS — N30.00 ACUTE CYSTITIS WITHOUT HEMATURIA: ICD-10-CM

## 2020-11-25 DIAGNOSIS — E78.5 DYSLIPIDEMIA: ICD-10-CM

## 2020-11-25 DIAGNOSIS — Z00.00 ENCOUNTER FOR PREVENTIVE HEALTH EXAMINATION: Primary | ICD-10-CM

## 2020-11-25 DIAGNOSIS — I25.84 CORONARY ARTERY DISEASE DUE TO CALCIFIED CORONARY LESION: ICD-10-CM

## 2020-11-25 DIAGNOSIS — I10 ESSENTIAL HYPERTENSION: ICD-10-CM

## 2020-11-25 DIAGNOSIS — M85.80 OSTEOPENIA, UNSPECIFIED LOCATION: ICD-10-CM

## 2020-11-25 PROCEDURE — 3078F DIAST BP <80 MM HG: CPT | Mod: HCNC,CPTII,S$GLB, | Performed by: INTERNAL MEDICINE

## 2020-11-25 PROCEDURE — 1126F AMNT PAIN NOTED NONE PRSNT: CPT | Mod: HCNC,S$GLB,, | Performed by: INTERNAL MEDICINE

## 2020-11-25 PROCEDURE — 3074F PR MOST RECENT SYSTOLIC BLOOD PRESSURE < 130 MM HG: ICD-10-PCS | Mod: HCNC,CPTII,S$GLB, | Performed by: INTERNAL MEDICINE

## 2020-11-25 PROCEDURE — 99397 PR PREVENTIVE VISIT,EST,65 & OVER: ICD-10-PCS | Mod: HCNC,S$GLB,, | Performed by: INTERNAL MEDICINE

## 2020-11-25 PROCEDURE — 3078F PR MOST RECENT DIASTOLIC BLOOD PRESSURE < 80 MM HG: ICD-10-PCS | Mod: HCNC,CPTII,S$GLB, | Performed by: INTERNAL MEDICINE

## 2020-11-25 PROCEDURE — 99397 PER PM REEVAL EST PAT 65+ YR: CPT | Mod: HCNC,S$GLB,, | Performed by: INTERNAL MEDICINE

## 2020-11-25 PROCEDURE — 1126F PR PAIN SEVERITY QUANTIFIED, NO PAIN PRESENT: ICD-10-PCS | Mod: HCNC,S$GLB,, | Performed by: INTERNAL MEDICINE

## 2020-11-25 PROCEDURE — 99999 PR PBB SHADOW E&M-EST. PATIENT-LVL III: ICD-10-PCS | Mod: PBBFAC,HCNC,, | Performed by: INTERNAL MEDICINE

## 2020-11-25 PROCEDURE — 1101F PR PT FALLS ASSESS DOC 0-1 FALLS W/OUT INJ PAST YR: ICD-10-PCS | Mod: HCNC,CPTII,S$GLB, | Performed by: INTERNAL MEDICINE

## 2020-11-25 PROCEDURE — 99999 PR PBB SHADOW E&M-EST. PATIENT-LVL III: CPT | Mod: PBBFAC,HCNC,, | Performed by: INTERNAL MEDICINE

## 2020-11-25 PROCEDURE — 3074F SYST BP LT 130 MM HG: CPT | Mod: HCNC,CPTII,S$GLB, | Performed by: INTERNAL MEDICINE

## 2020-11-25 PROCEDURE — 3288F PR FALLS RISK ASSESSMENT DOCUMENTED: ICD-10-PCS | Mod: HCNC,CPTII,S$GLB, | Performed by: INTERNAL MEDICINE

## 2020-11-25 PROCEDURE — 1101F PT FALLS ASSESS-DOCD LE1/YR: CPT | Mod: HCNC,CPTII,S$GLB, | Performed by: INTERNAL MEDICINE

## 2020-11-25 PROCEDURE — 3288F FALL RISK ASSESSMENT DOCD: CPT | Mod: HCNC,CPTII,S$GLB, | Performed by: INTERNAL MEDICINE

## 2020-11-25 RX ORDER — CEPHALEXIN 500 MG/1
500 CAPSULE ORAL 3 TIMES DAILY
Qty: 21 CAPSULE | Refills: 0 | Status: SHIPPED | OUTPATIENT
Start: 2020-11-25 | End: 2021-07-15

## 2020-11-25 NOTE — PROGRESS NOTES
History of present illness:  [age] year lady in today for general health assessment.    Current medications:  All medications noted and reviewed in the electronic medical record medication list.    Review of systems:  General: no fever, chills, generalized body aches. No unexpected weight loss.  Eyes:  No visual disturbances.  HEENT:  No hoarseness, dysphagia, ear pain.  Respiratory:  No cough, no shortness of breath.  Cardiovascular: no chest pain, palpitations, cough, exertional limb pain. No edema.  GI: no nausea, vomiting.  No abdominal pain. No change in bowel habits.  No melena, no hematochezia.  Recent GI symptoms have resolved.  She did see her outside gastroenterologist and had an EGD.  : no dysuria. No change in the color or character of the urine.  Still having some slight discomfort with urination.  Treated 1 month ago for UTI with nitrofurantoin.  Musculoskeletal: no joint pain or swelling.  Neurologic:  No focal neurological complaints.  No headaches.  Skin:  No rashes or other concerns.  Psych:  No emotional issues      Past medical history, past surgical history, family medical history social history is are all noted and reviewed in the electronic medical record history sections.    Health screenings:  Colonoscopy is up-to-date outside of our institution.  Mammogram May 2020.  Gynecologic visit is up-to-date and scheduled.  She has had both pneumococcal vaccines.  Bone densitometry November 2019.    Physical examination:  GENERAL:  Alert, appropriately groomed, no acute distress.  VS:  Blood pressure 106/60 taken by this examiner  EYES: sclerae white ,nonicteric. PERRL.  HEENT:  Normocephalic. Ear canals and tympanic membranes normal. Mouth and pharynx normal. No thyromegaly. Trachea midline and freely mobile.  LUNGS:  Clear to ascultation and normal to percussion.  CARDIOVASCULAR:  Normal heart sounds.  No significant murmur. Carotids full bilaterally without bruit.  Pedal pulses intact .   No abdominal bruit.  No peripheral extremity edema.  GI: the abdomen is soft, no distension. No masses , tenderness, organomegaly..  LYMPHATIC:  No axillary, inguinal , cervical adenopathy.  MUSCULOSKELETAL:  Range of motion, stability and strength of the right and left upper and lower extremities normal. No swollen or tender joints  NEUROLOGIC:  DTR's normal. No gross motor or sensory deficits apparent, gait normal.  SKIN:  No rashes.   MS:  Alert, oriented , affect and mood all appropriate    Data:  Reviewed recent health maintenance laboratory data from October 27th.  Urine noted.    Impression:  Generally healthy 75-year-old lady with a couple of chronic medical conditions.  Hypertension is controlled.  Dyslipidemia on statin therapy.  Coronary artery disease as evidenced by elevated Agatston coronary score.  Osteopenia currently deferring pharmacologic therapy.  Persistent lower UTI symptomatologies.    Plan:  Urine is submitted for culture.  Cephalexin 500 mg t.i.d. 7 days.  Continue current pharmacologic regimens.  Return to clinic 6 months follow-up.

## 2020-12-04 ENCOUNTER — TELEPHONE (OUTPATIENT)
Dept: INTERNAL MEDICINE | Facility: CLINIC | Age: 75
End: 2020-12-04

## 2020-12-04 RX ORDER — PROMETHAZINE HYDROCHLORIDE 25 MG/1
TABLET ORAL
Qty: 20 TABLET | Refills: 2 | Status: SHIPPED | OUTPATIENT
Start: 2020-12-04 | End: 2020-12-05

## 2020-12-04 RX ORDER — PROMETHAZINE HYDROCHLORIDE 25 MG/1
TABLET ORAL
Qty: 20 TABLET | Refills: 2 | OUTPATIENT
Start: 2020-12-04

## 2020-12-04 NOTE — TELEPHONE ENCOUNTER
Sent to dr. Angeles to send in again to [facility] pharmacy.    Attempted to call in but unable to reach [facility].

## 2020-12-04 NOTE — TELEPHONE ENCOUNTER
----- Message from Luz Carrillo sent at 12/4/2020 10:23 AM CST -----  Contact: Patient @ [phone]  Requesting an RX refill or new RX.  Is this a refill or new RX:   RX name and strength:promethazine (PHENERGAN) 25 MG tablet  Is this a 30 day or 90 day RX:   Pharmacy name and phone #[facility] #[id number] - [place] - [address]  AT [facility] OF [facility] [address]  Comments:

## 2020-12-04 NOTE — TELEPHONE ENCOUNTER
----- Message from Giselle Jeff sent at 12/4/2020  3:47 PM CST -----  Contact: Pt- [phone]  Requesting an RX refill or new RX.    Is this a refill or new RX: refill    RX name and strength: promethazine (PHENERGAN) 25 MG tablet    Pharmacy name and phone # (copy/paste from chart):  [id number] [place] - [address]  AT [facility] OF [address] [phone] (Phone)  [phone] (Fax)    Comments: Pt states [facility] has not received her medication. She is requesting a call back by the end of the day.

## 2020-12-04 NOTE — TELEPHONE ENCOUNTER
----- Message from Lidia Kessler sent at 12/4/2020  2:42 PM CST -----  Contact: [phone]  Pt states pharmacy doesn't have medication promethazine (PHENERGAN) 25 MG tablet

## 2020-12-04 NOTE — TELEPHONE ENCOUNTER
----- Message from Ana Peñaloza sent at 12/4/2020  2:48 PM CST -----  Contact: Patient  INCORRECT PHARMACY    Rx promethazine (PHENERGAN) 25 MG tablet was sent to [facility] instead of [facility].    [facility] #[id number] - [place] [facility]  AT [facility] & [address]  [address] [place] [address]  Phone: [phone] Fax: [phone]    Please fix    Thank you

## 2020-12-04 NOTE — TELEPHONE ENCOUNTER
----- Message from Veronica Metzger sent at 12/4/2020  7:52 AM CST -----  Contact: [phone]  Requesting an RX refill or new RX.  Is this a refill or new RX: refill  RX name and strength:promethazine (PHENERGAN) 25 MG tablet 20 tablet   Is this a 30 day or 90 day RX:   Pharmacy name and phone # (copy/paste from chart):  [id number] [address] AT [facility] OF [address] [phone] (Phone)  [phone] (Fax)  Comments:

## 2020-12-05 ENCOUNTER — NURSE TRIAGE (OUTPATIENT)
Dept: ADMINISTRATIVE | Facility: CLINIC | Age: 75
End: 2020-12-05

## 2020-12-05 RX ORDER — PROMETHAZINE HYDROCHLORIDE 25 MG/1
25 TABLET ORAL EVERY 6 HOURS PRN
Qty: 20 TABLET | Refills: 2 | Status: SHIPPED | OUTPATIENT
Start: 2020-12-05 | End: 2020-12-07 | Stop reason: SDUPTHER

## 2020-12-05 NOTE — TELEPHONE ENCOUNTER
"Called in RX to [facility].   Reason for Disposition   Caller requesting a refill, no triage required, and triager able to refill per unit policy    Additional Information   Negative: Drug overdose and triager unable to answer question   Negative: Caller requesting information unrelated to medicine   Negative: Caller requesting a prescription for Strep throat and has a positive culture result   Negative: Rash while taking a medication or within 3 days of stopping it   Negative: Immunization reaction suspected   Negative: [1] Asthma and [2] having symptoms of asthma (cough, wheezing, etc.)   Negative: [1] Influenza symptoms AND [2] anti-viral med prescription request, such as Tamiflu   Negative: [1] Symptom of illness (e.g., headache, abdominal pain, earache, vomiting) AND [2] more than mild   Negative: MORE THAN A DOUBLE DOSE of a prescription or over-the-counter (OTC) drug   Negative: [1] DOUBLE DOSE (an extra dose or lesser amount) of over-the-counter (OTC) drug AND [2] any symptoms (e.g., dizziness, nausea, pain, sleepiness)   Negative: [1] DOUBLE DOSE (an extra dose or lesser amount) of prescription drug AND [2] any symptoms (e.g., dizziness, nausea, pain, sleepiness)   Negative: Took another person's prescription drug   Negative: [1] DOUBLE DOSE (an extra dose or lesser amount) of prescription drug AND [2] NO symptoms (Exception: a double dose of antibiotics)   Negative: Diabetes drug error or overdose (e.g., took wrong type of insulin or took extra dose)   Negative: [1] Request for URGENT new prescription or refill of "essential" medication (i.e., likelihood of harm to patient if not taken) AND [2] triager unable to fill per unit policy   Negative: [1] Prescription not at pharmacy AND [2] was prescribed by PCP recently   Negative: [1] Pharmacy calling with prescription questions AND [2] triager unable to answer question   Negative: [1] Caller has URGENT medication question about med that PCP " or specialist prescribed AND [2] triager unable to answer question   Negative: [1] Caller has NON-URGENT medication question about med that PCP prescribed AND [2] triager unable to answer question   Negative: [1] Caller requesting a NON-URGENT new prescription or refill AND [2] triager unable to refill per unit policy   Negative: [1] Caller has medication question about med not prescribed by PCP AND [2] triager unable to answer question (e.g., compatibility with other med, storage)   Negative: Caller requesting a CONTROLLED substance prescription refill (e.g., narcotics, ADHD medicines)   Negative: Caller wants to use a complementary or alternative medicine   Negative: [1] Prescription prescribed recently is not at pharmacy AND [2] triager has access to patient's EMR AND [3] prescription is recorded in the EMR   Negative: [1] DOUBLE DOSE (an extra dose or lesser amount) of over-the-counter (OTC) drug AND [2] NO symptoms   Negative: [1] DOUBLE DOSE (an extra dose or lesser amount) of antibiotic drug AND [2] NO symptoms   Negative: Caller has medication question only, adult not sick, and triager answers question   Negative: Caller has medication question, adult has minor symptoms, caller declines triage, AND triager answers question   Negative: Caller requesting information about medication during pregnancy; adult is not ill AND triager answers question   Negative: Caller requesting information about medication use with breastfeeding; neither adult nor infant is ill, triager answers question    Protocols used: [facility]

## 2020-12-07 RX ORDER — PROMETHAZINE HYDROCHLORIDE 25 MG/1
25 TABLET ORAL EVERY 6 HOURS PRN
Qty: 20 TABLET | Refills: 2 | Status: SHIPPED | OUTPATIENT
Start: 2020-12-07 | End: 2021-05-24 | Stop reason: SDUPTHER

## 2020-12-07 RX ORDER — ALPRAZOLAM 0.25 MG/1
0.25 TABLET ORAL DAILY PRN
Qty: 20 TABLET | Refills: 0 | Status: SHIPPED | OUTPATIENT
Start: 2020-12-07 | End: 2021-01-06 | Stop reason: SDUPTHER

## 2020-12-07 NOTE — TELEPHONE ENCOUNTER
----- Message from Rula Metzger MA sent at 12/7/2020  8:48 AM CST -----  Contact: [phone]  Requesting an RX refill or new RX.  Is this a refill or new RX:   RX name and strength:ALPRAZolam (XANAX) 0.25 MG tablet  Is this a 30 day or 90 day RX: 30  Pharmacy name and phone # (copy/paste from chart):  [facility] #[id number]  [place] - [address] AT [facility] OF [address] [phone] (Phone)  [phone] (Fax)  Comments: Pt is very up set about her Rx's wasn't refilled.  promethazine (PHENERGAN) 25 MG tablet, pt is very up set about this refill and would like the nurse to give her a call back as soon as possible.

## 2020-12-14 ENCOUNTER — OFFICE VISIT (OUTPATIENT)
Dept: OBSTETRICS AND GYNECOLOGY | Facility: CLINIC | Age: 75
End: 2020-12-14
Attending: OBSTETRICS & GYNECOLOGY
Payer: MEDICARE

## 2020-12-14 VITALS
HEIGHT: 65 IN | BODY MASS INDEX: 24.12 KG/M2 | WEIGHT: 144.81 LBS | DIASTOLIC BLOOD PRESSURE: 60 MMHG | SYSTOLIC BLOOD PRESSURE: 120 MMHG

## 2020-12-14 DIAGNOSIS — N76.0 ACUTE VAGINITIS: ICD-10-CM

## 2020-12-14 DIAGNOSIS — Z90.710 HISTORY OF HYSTERECTOMY: ICD-10-CM

## 2020-12-14 DIAGNOSIS — Z01.419 WELL WOMAN EXAM WITH ROUTINE GYNECOLOGICAL EXAM: Primary | ICD-10-CM

## 2020-12-14 DIAGNOSIS — Z12.31 VISIT FOR SCREENING MAMMOGRAM: ICD-10-CM

## 2020-12-14 DIAGNOSIS — Z78.0 POSTMENOPAUSAL STATUS: ICD-10-CM

## 2020-12-14 PROCEDURE — G0101 CA SCREEN;PELVIC/BREAST EXAM: HCPCS | Mod: GZ,HCNC,S$GLB, | Performed by: OBSTETRICS & GYNECOLOGY

## 2020-12-14 PROCEDURE — G0101 PR CA SCREEN;PELVIC/BREAST EXAM: ICD-10-PCS | Mod: GZ,HCNC,S$GLB, | Performed by: OBSTETRICS & GYNECOLOGY

## 2020-12-14 PROCEDURE — 99999 PR PBB SHADOW E&M-EST. PATIENT-LVL II: CPT | Mod: PBBFAC,HCNC,, | Performed by: OBSTETRICS & GYNECOLOGY

## 2020-12-14 PROCEDURE — 1101F PR PT FALLS ASSESS DOC 0-1 FALLS W/OUT INJ PAST YR: ICD-10-PCS | Mod: HCNC,CPTII,S$GLB, | Performed by: OBSTETRICS & GYNECOLOGY

## 2020-12-14 PROCEDURE — 3288F FALL RISK ASSESSMENT DOCD: CPT | Mod: HCNC,CPTII,S$GLB, | Performed by: OBSTETRICS & GYNECOLOGY

## 2020-12-14 PROCEDURE — 1101F PT FALLS ASSESS-DOCD LE1/YR: CPT | Mod: HCNC,CPTII,S$GLB, | Performed by: OBSTETRICS & GYNECOLOGY

## 2020-12-14 PROCEDURE — 99999 PR PBB SHADOW E&M-EST. PATIENT-LVL II: ICD-10-PCS | Mod: PBBFAC,HCNC,, | Performed by: OBSTETRICS & GYNECOLOGY

## 2020-12-14 PROCEDURE — 3288F PR FALLS RISK ASSESSMENT DOCUMENTED: ICD-10-PCS | Mod: HCNC,CPTII,S$GLB, | Performed by: OBSTETRICS & GYNECOLOGY

## 2020-12-14 PROCEDURE — 1126F PR PAIN SEVERITY QUANTIFIED, NO PAIN PRESENT: ICD-10-PCS | Mod: HCNC,S$GLB,, | Performed by: OBSTETRICS & GYNECOLOGY

## 2020-12-14 PROCEDURE — 1126F AMNT PAIN NOTED NONE PRSNT: CPT | Mod: HCNC,S$GLB,, | Performed by: OBSTETRICS & GYNECOLOGY

## 2020-12-14 NOTE — PROGRESS NOTES
Nesha Landa is a 75 y.o. year old  who presents for annual exam.  S/P DAO for endometriosis (ovaries were not removed), not on hormones.  Denies bleeding, hot flashes, and sweats.  Reports having frequent UTIs over the past year.  Urine culture 20 was negative.  Currently, she is not having any urinary symptoms.  For the past several months, she describes noting a thin discharge but no odor or itching.     20 Mammogram Negative, TC 1.48      Past Medical History:   Diagnosis Date    Coronary arteriosclerosis     HTN (hypertension)     Hypercholesteremia     LBBB (left bundle branch block)     Osteopenia        Past Surgical History:   Procedure Laterality Date    CATARACT EXTRACTION      10 years+ OU    HYSTERECTOMY      partial colectomy         OB History        10    Para   5    Term   5            AB        Living   5       SAB        TAB        Ectopic        Multiple        Live Births   5                 ROS:  GENERAL: Feeling well overall.   SKIN: Denies rash or lesions.   HEAD: Denies head injury or headache.   NODES: Denies enlarged lymph nodes.   CHEST: Denies chest pain or shortness of breath.   CARDIOVASCULAR: Denies palpitations or left sided chest pain.   ABDOMEN: No abdominal pain.  Reports occasional nausea.  URINARY: No dysuria or hematuria.  REPRODUCTIVE: See HPI.   BREASTS: Denies pain, lumps, or nipple discharge.   HEMATOLOGIC: No easy bruisability or excessive bleeding.   MUSCULOSKELETAL: Reports some joint discomfort.   NEUROLOGIC: Denies syncope or weakness.   PSYCHIATRIC: Denies depression.    PE:  (chaperone present during entire exam)  APPEARANCE: Well nourished, well developed, in no acute distress.  NODES: No inguinal lymph node enlargement.  ABDOMEN: Soft. No tenderness or masses. No hernias.  BREASTS: Symmetrical, no skin changes or visible lesions. No palpable masses, nipple discharge or adenopathy bilaterally.  PELVIC: Atrophic external  female genitalia without lesions. Normal hair distribution. Adequate perineal body, normal urethral meatus. Vagina atrophic without lesions.  Minimal thin discharge. No significant cystocele or rectocele. Uterus and cervix surgically absent. Bimanual exam revealed no masses, tenderness or abnormality.  ANUS: Normal.    Diagnosis:  1. Well woman exam with routine gynecological exam    2. Postmenopausal status    3. History of hysterectomy    4. Acute vaginitis    5. Visit for screening mammogram          PLAN:    Orders Placed This Encounter    Vaginosis Screen by DNA Probe       Patient was counseled today on postmenopausal issues.  We also discussed vaginitis: etiology, diagnosis, and treatment - we will contact her with results of the [facility].    Follow-up in 1 year.

## 2020-12-17 ENCOUNTER — TELEPHONE (OUTPATIENT)
Dept: OBSTETRICS AND GYNECOLOGY | Facility: CLINIC | Age: 75
End: 2020-12-17

## 2021-01-06 RX ORDER — ALPRAZOLAM 0.25 MG/1
0.25 TABLET ORAL DAILY PRN
Qty: 20 TABLET | Refills: 0 | Status: SHIPPED | OUTPATIENT
Start: 2021-01-06 | End: 2021-01-07 | Stop reason: SDUPTHER

## 2021-01-07 ENCOUNTER — TELEPHONE (OUTPATIENT)
Dept: INTERNAL MEDICINE | Facility: CLINIC | Age: 76
End: 2021-01-07

## 2021-01-07 RX ORDER — ALPRAZOLAM 0.25 MG/1
0.25 TABLET ORAL DAILY PRN
Qty: 20 TABLET | Refills: 0 | Status: SHIPPED | OUTPATIENT
Start: 2021-01-07 | End: 2021-04-01 | Stop reason: SDUPTHER

## 2021-02-12 ENCOUNTER — IMMUNIZATION (OUTPATIENT)
Dept: INTERNAL MEDICINE | Facility: CLINIC | Age: 76
End: 2021-02-12
Payer: MEDICARE

## 2021-02-12 DIAGNOSIS — Z23 NEED FOR VACCINATION: Primary | ICD-10-CM

## 2021-02-12 PROCEDURE — 0011A COVID-19, MRNA, LNP-S, PF, 100 MCG/0.5 ML DOSE VACCINE: CPT | Mod: PBBFAC | Performed by: INTERNAL MEDICINE

## 2021-03-30 ENCOUNTER — PES CALL (OUTPATIENT)
Dept: ADMINISTRATIVE | Facility: CLINIC | Age: 76
End: 2021-03-30

## 2021-04-01 RX ORDER — ALPRAZOLAM 0.25 MG/1
0.25 TABLET ORAL DAILY PRN
Qty: 20 TABLET | Refills: 0 | Status: SHIPPED | OUTPATIENT
Start: 2021-04-01 | End: 2021-05-06

## 2021-04-06 DIAGNOSIS — I25.10 CORONARY ARTERY DISEASE DUE TO CALCIFIED CORONARY LESION: Primary | ICD-10-CM

## 2021-04-06 DIAGNOSIS — I42.9 CARDIOMYOPATHY, UNSPECIFIED TYPE: ICD-10-CM

## 2021-04-06 DIAGNOSIS — E78.00 HYPERCHOLESTEREMIA: ICD-10-CM

## 2021-04-06 DIAGNOSIS — I25.84 CORONARY ARTERY DISEASE DUE TO CALCIFIED CORONARY LESION: Primary | ICD-10-CM

## 2021-05-06 RX ORDER — ALPRAZOLAM 0.25 MG/1
TABLET ORAL
Qty: 20 TABLET | Refills: 0 | Status: SHIPPED | OUTPATIENT
Start: 2021-05-06 | End: 2021-05-24 | Stop reason: SDUPTHER

## 2021-05-17 ENCOUNTER — TELEPHONE (OUTPATIENT)
Dept: INTERNAL MEDICINE | Facility: CLINIC | Age: 76
End: 2021-05-17

## 2021-05-17 DIAGNOSIS — Z12.31 VISIT FOR SCREENING MAMMOGRAM: Primary | ICD-10-CM

## 2021-05-24 RX ORDER — ALPRAZOLAM 0.25 MG/1
TABLET ORAL
Qty: 20 TABLET | Refills: 0 | Status: SHIPPED | OUTPATIENT
Start: 2021-05-24 | End: 2021-06-24 | Stop reason: SDUPTHER

## 2021-05-24 RX ORDER — PROMETHAZINE HYDROCHLORIDE 25 MG/1
25 TABLET ORAL EVERY 6 HOURS PRN
Qty: 20 TABLET | Refills: 2 | Status: SHIPPED | OUTPATIENT
Start: 2021-05-24 | End: 2021-11-09

## 2021-05-26 ENCOUNTER — PES CALL (OUTPATIENT)
Dept: ADMINISTRATIVE | Facility: CLINIC | Age: 76
End: 2021-05-26

## 2021-05-31 ENCOUNTER — TELEPHONE (OUTPATIENT)
Dept: INTERNAL MEDICINE | Facility: CLINIC | Age: 76
End: 2021-05-31

## 2021-05-31 NOTE — TELEPHONE ENCOUNTER
----- Message from Oliver Warren MD sent at 9/5/2019  1:05 PM CDT -----  Advise patient to decrease HCTZ to 5 days a week.   <<----- Click to add NO pertinent Family History

## 2021-06-01 ENCOUNTER — HOSPITAL ENCOUNTER (OUTPATIENT)
Dept: RADIOLOGY | Facility: HOSPITAL | Age: 76
Discharge: HOME | End: 2021-06-01
Attending: INTERNAL MEDICINE
Payer: MEDICARE

## 2021-06-01 DIAGNOSIS — Z12.31 VISIT FOR SCREENING MAMMOGRAM: ICD-10-CM

## 2021-06-01 PROCEDURE — 77067 SCR MAMMO BI INCL CAD: CPT | Mod: TC,PO

## 2021-06-01 PROCEDURE — 77067 SCR MAMMO BI INCL CAD: CPT | Mod: 26,,, | Performed by: RADIOLOGY

## 2021-06-01 PROCEDURE — 77067 MAMMO DIGITAL SCREENING BILAT WITH TOMO: ICD-10-PCS | Mod: 26,,, | Performed by: RADIOLOGY

## 2021-06-01 PROCEDURE — 77063 MAMMO DIGITAL SCREENING BILAT WITH TOMO: ICD-10-PCS | Mod: 26,,, | Performed by: RADIOLOGY

## 2021-06-01 PROCEDURE — 77063 BREAST TOMOSYNTHESIS BI: CPT | Mod: 26,,, | Performed by: RADIOLOGY

## 2021-06-24 RX ORDER — ALPRAZOLAM 0.25 MG/1
TABLET ORAL
Qty: 20 TABLET | Refills: 0 | Status: SHIPPED | OUTPATIENT
Start: 2021-06-24 | End: 2021-08-04

## 2021-07-09 ENCOUNTER — LAB VISIT (OUTPATIENT)
Dept: LAB | Facility: HOSPITAL | Age: 76
End: 2021-07-09
Payer: MEDICARE

## 2021-07-09 ENCOUNTER — TELEPHONE (OUTPATIENT)
Dept: OPTOMETRY | Facility: CLINIC | Age: 76
End: 2021-07-09

## 2021-07-09 DIAGNOSIS — I25.10 CORONARY ARTERY DISEASE DUE TO CALCIFIED CORONARY LESION: ICD-10-CM

## 2021-07-09 DIAGNOSIS — I42.9 CARDIOMYOPATHY, UNSPECIFIED TYPE: ICD-10-CM

## 2021-07-09 DIAGNOSIS — I25.84 CORONARY ARTERY DISEASE DUE TO CALCIFIED CORONARY LESION: ICD-10-CM

## 2021-07-09 DIAGNOSIS — E78.00 HYPERCHOLESTEREMIA: ICD-10-CM

## 2021-07-09 LAB
ALBUMIN SERPL BCP-MCNC: 3.4 G/DL (ref 3.5–5.2)
ALP SERPL-CCNC: 62 U/L (ref 55–135)
ALT SERPL W/O P-5'-P-CCNC: 16 U/L (ref 10–44)
ANION GAP SERPL CALC-SCNC: 6 MMOL/L (ref 8–16)
AST SERPL-CCNC: 21 U/L (ref 10–40)
BILIRUB SERPL-MCNC: 0.4 MG/DL (ref 0.1–1)
BUN SERPL-MCNC: 11 MG/DL (ref 8–23)
CALCIUM SERPL-MCNC: 9.8 MG/DL (ref 8.7–10.5)
CHLORIDE SERPL-SCNC: 105 MMOL/L (ref 95–110)
CHOLEST SERPL-MCNC: 141 MG/DL (ref 120–199)
CHOLEST/HDLC SERPL: 2.2 {RATIO} (ref 2–5)
CO2 SERPL-SCNC: 29 MMOL/L (ref 23–29)
CREAT SERPL-MCNC: 0.9 MG/DL (ref 0.5–1.4)
EST. GFR  (AFRICAN AMERICAN): >60 ML/MIN/1.73 M^2
EST. GFR  (NON AFRICAN AMERICAN): >60 ML/MIN/1.73 M^2
GLUCOSE SERPL-MCNC: 100 MG/DL (ref 70–110)
HDLC SERPL-MCNC: 63 MG/DL (ref 40–75)
HDLC SERPL: 44.7 % (ref 20–50)
LDLC SERPL CALC-MCNC: 64.4 MG/DL (ref 63–159)
NONHDLC SERPL-MCNC: 78 MG/DL
POTASSIUM SERPL-SCNC: 4.5 MMOL/L (ref 3.5–5.1)
PROT SERPL-MCNC: 6.5 G/DL (ref 6–8.4)
SODIUM SERPL-SCNC: 140 MMOL/L (ref 136–145)
TRIGL SERPL-MCNC: 68 MG/DL (ref 30–150)

## 2021-07-09 PROCEDURE — 80061 LIPID PANEL: CPT | Performed by: INTERNAL MEDICINE

## 2021-07-09 PROCEDURE — 80053 COMPREHEN METABOLIC PANEL: CPT | Performed by: INTERNAL MEDICINE

## 2021-07-09 PROCEDURE — 36415 COLL VENOUS BLD VENIPUNCTURE: CPT | Mod: PO | Performed by: INTERNAL MEDICINE

## 2021-07-10 ENCOUNTER — OFFICE VISIT (OUTPATIENT)
Dept: URGENT CARE | Facility: CLINIC | Age: 76
End: 2021-07-10
Payer: MEDICARE

## 2021-07-10 VITALS
WEIGHT: 142 LBS | SYSTOLIC BLOOD PRESSURE: 127 MMHG | RESPIRATION RATE: 16 BRPM | HEIGHT: 65 IN | OXYGEN SATURATION: 97 % | TEMPERATURE: 97 F | DIASTOLIC BLOOD PRESSURE: 79 MMHG | BODY MASS INDEX: 23.66 KG/M2 | HEART RATE: 59 BPM
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
## 2021-07-10 DIAGNOSIS — R09.81 NASAL CONGESTION: Primary | ICD-10-CM

## 2021-07-10 DIAGNOSIS — J01.80 ACUTE NON-RECURRENT SINUSITIS OF OTHER SINUS: ICD-10-CM

## 2021-07-10 LAB
CTP QC/QA: YES
SARS-COV-2 RDRP RESP QL NAA+PROBE: NEGATIVE

## 2021-07-10 PROCEDURE — 99214 PR OFFICE/OUTPT VISIT, EST, LEVL IV, 30-39 MIN: ICD-10-PCS | Mod: S$GLB,,, | Performed by: FAMILY MEDICINE

## 2021-07-10 PROCEDURE — 99214 OFFICE O/P EST MOD 30 MIN: CPT | Mod: S$GLB,,, | Performed by: FAMILY MEDICINE

## 2021-07-10 PROCEDURE — U0002: ICD-10-PCS | Mod: QW,S$GLB,, | Performed by: FAMILY MEDICINE

## 2021-07-10 PROCEDURE — U0002 COVID-19 LAB TEST NON-CDC: HCPCS | Mod: QW,S$GLB,, | Performed by: FAMILY MEDICINE

## 2021-07-10 RX ORDER — AMOXICILLIN AND CLAVULANATE POTASSIUM 875; 125 MG/1; MG/1
1 TABLET, FILM COATED ORAL 2 TIMES DAILY
Qty: 14 TABLET | Refills: 0 | Status: SHIPPED | OUTPATIENT
Start: 2021-07-10 | End: 2021-07-15 | Stop reason: ALTCHOICE

## 2021-07-14 ENCOUNTER — PATIENT OUTREACH (OUTPATIENT)
Dept: ADMINISTRATIVE | Facility: OTHER | Age: 76
End: 2021-07-14

## 2021-07-15 ENCOUNTER — OFFICE VISIT (OUTPATIENT)
Dept: CARDIOLOGY | Facility: CLINIC | Age: 76
End: 2021-07-15
Payer: MEDICARE

## 2021-07-15 VITALS
HEIGHT: 65 IN | SYSTOLIC BLOOD PRESSURE: 128 MMHG | WEIGHT: 140 LBS | DIASTOLIC BLOOD PRESSURE: 76 MMHG | HEART RATE: 56 BPM | BODY MASS INDEX: 23.32 KG/M2

## 2021-07-15 DIAGNOSIS — I25.84 CORONARY ARTERY DISEASE DUE TO CALCIFIED CORONARY LESION: Primary | ICD-10-CM

## 2021-07-15 DIAGNOSIS — I42.9 CARDIOMYOPATHY, UNSPECIFIED TYPE: ICD-10-CM

## 2021-07-15 DIAGNOSIS — I10 ESSENTIAL HYPERTENSION: ICD-10-CM

## 2021-07-15 DIAGNOSIS — E78.00 HYPERCHOLESTEREMIA: ICD-10-CM

## 2021-07-15 DIAGNOSIS — I44.7 LBBB (LEFT BUNDLE BRANCH BLOCK): ICD-10-CM

## 2021-07-15 DIAGNOSIS — I25.10 CORONARY ARTERY DISEASE DUE TO CALCIFIED CORONARY LESION: Primary | ICD-10-CM

## 2021-07-15 PROCEDURE — 93010 ELECTROCARDIOGRAM REPORT: CPT | Mod: S$GLB,,, | Performed by: INTERNAL MEDICINE

## 2021-07-15 PROCEDURE — 99499 UNLISTED E&M SERVICE: CPT | Mod: HCNC,S$GLB,, | Performed by: INTERNAL MEDICINE

## 2021-07-15 PROCEDURE — 93010 EKG 12-LEAD: ICD-10-PCS | Mod: S$GLB,,, | Performed by: INTERNAL MEDICINE

## 2021-07-15 PROCEDURE — 1126F PR PAIN SEVERITY QUANTIFIED, NO PAIN PRESENT: ICD-10-PCS | Mod: CPTII,S$GLB,, | Performed by: INTERNAL MEDICINE

## 2021-07-15 PROCEDURE — 93005 EKG 12-LEAD: ICD-10-PCS | Mod: S$GLB,,, | Performed by: INTERNAL MEDICINE

## 2021-07-15 PROCEDURE — 3078F DIAST BP <80 MM HG: CPT | Mod: CPTII,S$GLB,, | Performed by: INTERNAL MEDICINE

## 2021-07-15 PROCEDURE — 3074F SYST BP LT 130 MM HG: CPT | Mod: CPTII,S$GLB,, | Performed by: INTERNAL MEDICINE

## 2021-07-15 PROCEDURE — 1101F PR PT FALLS ASSESS DOC 0-1 FALLS W/OUT INJ PAST YR: ICD-10-PCS | Mod: CPTII,S$GLB,, | Performed by: INTERNAL MEDICINE

## 2021-07-15 PROCEDURE — 3078F PR MOST RECENT DIASTOLIC BLOOD PRESSURE < 80 MM HG: ICD-10-PCS | Mod: CPTII,S$GLB,, | Performed by: INTERNAL MEDICINE

## 2021-07-15 PROCEDURE — 93005 ELECTROCARDIOGRAM TRACING: CPT | Mod: S$GLB,,, | Performed by: INTERNAL MEDICINE

## 2021-07-15 PROCEDURE — 99499 RISK ADDL DX/OHS AUDIT: ICD-10-PCS | Mod: HCNC,S$GLB,, | Performed by: INTERNAL MEDICINE

## 2021-07-15 PROCEDURE — 3288F FALL RISK ASSESSMENT DOCD: CPT | Mod: CPTII,S$GLB,, | Performed by: INTERNAL MEDICINE

## 2021-07-15 PROCEDURE — 1101F PT FALLS ASSESS-DOCD LE1/YR: CPT | Mod: CPTII,S$GLB,, | Performed by: INTERNAL MEDICINE

## 2021-07-15 PROCEDURE — 99999 PR PBB SHADOW E&M-EST. PATIENT-LVL V: CPT | Mod: PBBFAC,,, | Performed by: INTERNAL MEDICINE

## 2021-07-15 PROCEDURE — 99999 PR PBB SHADOW E&M-EST. PATIENT-LVL V: ICD-10-PCS | Mod: PBBFAC,,, | Performed by: INTERNAL MEDICINE

## 2021-07-15 PROCEDURE — 3074F PR MOST RECENT SYSTOLIC BLOOD PRESSURE < 130 MM HG: ICD-10-PCS | Mod: CPTII,S$GLB,, | Performed by: INTERNAL MEDICINE

## 2021-07-15 PROCEDURE — 3288F PR FALLS RISK ASSESSMENT DOCUMENTED: ICD-10-PCS | Mod: CPTII,S$GLB,, | Performed by: INTERNAL MEDICINE

## 2021-07-15 PROCEDURE — 99213 PR OFFICE/OUTPT VISIT, EST, LEVL III, 20-29 MIN: ICD-10-PCS | Mod: S$GLB,,, | Performed by: INTERNAL MEDICINE

## 2021-07-15 PROCEDURE — 99213 OFFICE O/P EST LOW 20 MIN: CPT | Mod: S$GLB,,, | Performed by: INTERNAL MEDICINE

## 2021-07-15 PROCEDURE — 1126F AMNT PAIN NOTED NONE PRSNT: CPT | Mod: CPTII,S$GLB,, | Performed by: INTERNAL MEDICINE

## 2021-07-27 ENCOUNTER — OFFICE VISIT (OUTPATIENT)
Dept: OPTOMETRY | Facility: CLINIC | Age: 76
End: 2021-07-27
Payer: MEDICARE

## 2021-07-27 DIAGNOSIS — Z96.1 PSEUDOPHAKIA OF BOTH EYES: ICD-10-CM

## 2021-07-27 DIAGNOSIS — H52.7 REFRACTIVE ERROR: ICD-10-CM

## 2021-07-27 DIAGNOSIS — I10 ESSENTIAL HYPERTENSION: Primary | ICD-10-CM

## 2021-07-27 PROCEDURE — 92014 PR EYE EXAM, EST PATIENT,COMPREHESV: ICD-10-PCS | Mod: S$GLB,,, | Performed by: OPTOMETRIST

## 2021-07-27 PROCEDURE — 99999 PR PBB SHADOW E&M-EST. PATIENT-LVL III: ICD-10-PCS | Mod: PBBFAC,,, | Performed by: OPTOMETRIST

## 2021-07-27 PROCEDURE — 92015 PR REFRACTION: ICD-10-PCS | Mod: S$GLB,,, | Performed by: OPTOMETRIST

## 2021-07-27 PROCEDURE — 92015 DETERMINE REFRACTIVE STATE: CPT | Mod: S$GLB,,, | Performed by: OPTOMETRIST

## 2021-07-27 PROCEDURE — 99999 PR PBB SHADOW E&M-EST. PATIENT-LVL III: CPT | Mod: PBBFAC,,, | Performed by: OPTOMETRIST

## 2021-07-27 PROCEDURE — 92014 COMPRE OPH EXAM EST PT 1/>: CPT | Mod: S$GLB,,, | Performed by: OPTOMETRIST

## 2021-07-27 RX ORDER — CYCLOBENZAPRINE HCL 10 MG
TABLET ORAL
COMMUNITY
Start: 2021-05-07

## 2021-07-27 RX ORDER — PREDNISONE 5 MG/1
TABLET ORAL
COMMUNITY
Start: 2021-05-07 | End: 2022-12-08

## 2021-07-27 RX ORDER — MUPIROCIN 20 MG/G
OINTMENT TOPICAL
COMMUNITY
Start: 2021-07-14 | End: 2022-12-08

## 2021-07-30 ENCOUNTER — PES CALL (OUTPATIENT)
Dept: ADMINISTRATIVE | Facility: CLINIC | Age: 76
End: 2021-07-30

## 2021-08-04 RX ORDER — ALPRAZOLAM 0.25 MG/1
TABLET ORAL
Qty: 20 TABLET | Refills: 1 | Status: SHIPPED | OUTPATIENT
Start: 2021-08-04 | End: 2021-10-08

## 2021-08-13 ENCOUNTER — TELEPHONE (OUTPATIENT)
Dept: OBSTETRICS AND GYNECOLOGY | Facility: CLINIC | Age: 76
End: 2021-08-13

## 2021-08-13 RX ORDER — FLUCONAZOLE 150 MG/1
TABLET ORAL
Qty: 2 TABLET | Refills: 0 | Status: SHIPPED | OUTPATIENT
Start: 2021-08-13 | End: 2022-12-08 | Stop reason: ALTCHOICE

## 2021-08-15 ENCOUNTER — PATIENT OUTREACH (OUTPATIENT)
Dept: ADMINISTRATIVE | Facility: OTHER | Age: 76
End: 2021-08-15

## 2021-08-16 ENCOUNTER — TELEPHONE (OUTPATIENT)
Dept: OBSTETRICS AND GYNECOLOGY | Facility: CLINIC | Age: 76
End: 2021-08-16

## 2021-08-16 ENCOUNTER — OFFICE VISIT (OUTPATIENT)
Dept: OBSTETRICS AND GYNECOLOGY | Facility: CLINIC | Age: 76
End: 2021-08-16
Attending: OBSTETRICS & GYNECOLOGY
Payer: MEDICARE

## 2021-08-16 VITALS
SYSTOLIC BLOOD PRESSURE: 110 MMHG | BODY MASS INDEX: 22.96 KG/M2 | WEIGHT: 137.81 LBS | HEIGHT: 65 IN | DIASTOLIC BLOOD PRESSURE: 74 MMHG

## 2021-08-16 DIAGNOSIS — Z78.0 POSTMENOPAUSAL STATUS: ICD-10-CM

## 2021-08-16 DIAGNOSIS — N76.0 VULVOVAGINITIS: Primary | ICD-10-CM

## 2021-08-16 DIAGNOSIS — Z90.710 HISTORY OF HYSTERECTOMY: ICD-10-CM

## 2021-08-16 PROCEDURE — 1101F PR PT FALLS ASSESS DOC 0-1 FALLS W/OUT INJ PAST YR: ICD-10-PCS | Mod: CPTII,S$GLB,, | Performed by: OBSTETRICS & GYNECOLOGY

## 2021-08-16 PROCEDURE — 1126F PR PAIN SEVERITY QUANTIFIED, NO PAIN PRESENT: ICD-10-PCS | Mod: CPTII,S$GLB,, | Performed by: OBSTETRICS & GYNECOLOGY

## 2021-08-16 PROCEDURE — 1101F PT FALLS ASSESS-DOCD LE1/YR: CPT | Mod: CPTII,S$GLB,, | Performed by: OBSTETRICS & GYNECOLOGY

## 2021-08-16 PROCEDURE — 1160F RVW MEDS BY RX/DR IN RCRD: CPT | Mod: CPTII,S$GLB,, | Performed by: OBSTETRICS & GYNECOLOGY

## 2021-08-16 PROCEDURE — 3074F SYST BP LT 130 MM HG: CPT | Mod: CPTII,S$GLB,, | Performed by: OBSTETRICS & GYNECOLOGY

## 2021-08-16 PROCEDURE — 99999 PR PBB SHADOW E&M-EST. PATIENT-LVL II: CPT | Mod: PBBFAC,,, | Performed by: OBSTETRICS & GYNECOLOGY

## 2021-08-16 PROCEDURE — 3078F DIAST BP <80 MM HG: CPT | Mod: CPTII,S$GLB,, | Performed by: OBSTETRICS & GYNECOLOGY

## 2021-08-16 PROCEDURE — 99999 PR PBB SHADOW E&M-EST. PATIENT-LVL II: ICD-10-PCS | Mod: PBBFAC,,, | Performed by: OBSTETRICS & GYNECOLOGY

## 2021-08-16 PROCEDURE — 3074F PR MOST RECENT SYSTOLIC BLOOD PRESSURE < 130 MM HG: ICD-10-PCS | Mod: CPTII,S$GLB,, | Performed by: OBSTETRICS & GYNECOLOGY

## 2021-08-16 PROCEDURE — 1159F MED LIST DOCD IN RCRD: CPT | Mod: CPTII,S$GLB,, | Performed by: OBSTETRICS & GYNECOLOGY

## 2021-08-16 PROCEDURE — 87481 CANDIDA DNA AMP PROBE: CPT | Mod: 59 | Performed by: OBSTETRICS & GYNECOLOGY

## 2021-08-16 PROCEDURE — 1126F AMNT PAIN NOTED NONE PRSNT: CPT | Mod: CPTII,S$GLB,, | Performed by: OBSTETRICS & GYNECOLOGY

## 2021-08-16 PROCEDURE — 3288F PR FALLS RISK ASSESSMENT DOCUMENTED: ICD-10-PCS | Mod: CPTII,S$GLB,, | Performed by: OBSTETRICS & GYNECOLOGY

## 2021-08-16 PROCEDURE — 99213 OFFICE O/P EST LOW 20 MIN: CPT | Mod: S$GLB,,, | Performed by: OBSTETRICS & GYNECOLOGY

## 2021-08-16 PROCEDURE — 3288F FALL RISK ASSESSMENT DOCD: CPT | Mod: CPTII,S$GLB,, | Performed by: OBSTETRICS & GYNECOLOGY

## 2021-08-16 PROCEDURE — 1160F PR REVIEW ALL MEDS BY PRESCRIBER/CLIN PHARMACIST DOCUMENTED: ICD-10-PCS | Mod: CPTII,S$GLB,, | Performed by: OBSTETRICS & GYNECOLOGY

## 2021-08-16 PROCEDURE — 1159F PR MEDICATION LIST DOCUMENTED IN MEDICAL RECORD: ICD-10-PCS | Mod: CPTII,S$GLB,, | Performed by: OBSTETRICS & GYNECOLOGY

## 2021-08-16 PROCEDURE — 3078F PR MOST RECENT DIASTOLIC BLOOD PRESSURE < 80 MM HG: ICD-10-PCS | Mod: CPTII,S$GLB,, | Performed by: OBSTETRICS & GYNECOLOGY

## 2021-08-16 PROCEDURE — 99213 PR OFFICE/OUTPT VISIT, EST, LEVL III, 20-29 MIN: ICD-10-PCS | Mod: S$GLB,,, | Performed by: OBSTETRICS & GYNECOLOGY

## 2021-08-16 RX ORDER — NYSTATIN AND TRIAMCINOLONE ACETONIDE 100000; 1 [USP'U]/G; MG/G
CREAM TOPICAL
Qty: 30 G | Refills: 1 | Status: SHIPPED | OUTPATIENT
Start: 2021-08-16 | End: 2022-09-14

## 2021-08-16 RX ORDER — AMOXICILLIN 875 MG/1
TABLET, FILM COATED ORAL
COMMUNITY
Start: 2021-08-04 | End: 2022-12-08 | Stop reason: ALTCHOICE

## 2021-08-18 LAB
BACTERIAL VAGINOSIS DNA: NEGATIVE
CANDIDA GLABRATA DNA: NEGATIVE
CANDIDA KRUSEI DNA: NEGATIVE
CANDIDA RRNA VAG QL PROBE: NEGATIVE
T VAGINALIS RRNA GENITAL QL PROBE: NEGATIVE

## 2021-08-19 ENCOUNTER — TELEPHONE (OUTPATIENT)
Dept: OBSTETRICS AND GYNECOLOGY | Facility: CLINIC | Age: 76
End: 2021-08-19

## 2021-09-17 ENCOUNTER — PES CALL (OUTPATIENT)
Dept: ADMINISTRATIVE | Facility: CLINIC | Age: 76
End: 2021-09-17

## 2021-10-06 ENCOUNTER — TELEPHONE (OUTPATIENT)
Dept: INTERNAL MEDICINE | Facility: CLINIC | Age: 76
End: 2021-10-06

## 2021-10-06 ENCOUNTER — OFFICE VISIT (OUTPATIENT)
Dept: INTERNAL MEDICINE | Facility: CLINIC | Age: 76
End: 2021-10-06
Payer: MEDICARE

## 2021-10-06 VITALS
SYSTOLIC BLOOD PRESSURE: 120 MMHG | RESPIRATION RATE: 15 BRPM | HEART RATE: 60 BPM | OXYGEN SATURATION: 97 % | BODY MASS INDEX: 22.48 KG/M2 | WEIGHT: 134.94 LBS | HEIGHT: 65 IN | DIASTOLIC BLOOD PRESSURE: 72 MMHG

## 2021-10-06 DIAGNOSIS — I25.84 CORONARY ARTERY DISEASE DUE TO CALCIFIED CORONARY LESION: ICD-10-CM

## 2021-10-06 DIAGNOSIS — R00.1 BRADYCARDIA: ICD-10-CM

## 2021-10-06 DIAGNOSIS — M85.80 OSTEOPENIA, UNSPECIFIED LOCATION: ICD-10-CM

## 2021-10-06 DIAGNOSIS — Z00.00 ENCOUNTER FOR PREVENTIVE HEALTH EXAMINATION: Primary | ICD-10-CM

## 2021-10-06 DIAGNOSIS — E55.9 VITAMIN D DEFICIENCY: ICD-10-CM

## 2021-10-06 DIAGNOSIS — I44.7 LBBB (LEFT BUNDLE BRANCH BLOCK): ICD-10-CM

## 2021-10-06 DIAGNOSIS — I10 ESSENTIAL HYPERTENSION: ICD-10-CM

## 2021-10-06 DIAGNOSIS — E78.00 HYPERCHOLESTEREMIA: ICD-10-CM

## 2021-10-06 DIAGNOSIS — E78.5 DYSLIPIDEMIA: ICD-10-CM

## 2021-10-06 DIAGNOSIS — I25.10 CORONARY ARTERY DISEASE DUE TO CALCIFIED CORONARY LESION: ICD-10-CM

## 2021-10-06 PROBLEM — E87.1 HYPONATREMIA: Status: RESOLVED | Noted: 2020-01-15 | Resolved: 2021-10-06

## 2021-10-06 PROCEDURE — 3078F DIAST BP <80 MM HG: CPT | Mod: HCNC,CPTII,S$GLB, | Performed by: NURSE PRACTITIONER

## 2021-10-06 PROCEDURE — 1160F PR REVIEW ALL MEDS BY PRESCRIBER/CLIN PHARMACIST DOCUMENTED: ICD-10-PCS | Mod: HCNC,CPTII,S$GLB, | Performed by: NURSE PRACTITIONER

## 2021-10-06 PROCEDURE — 3288F FALL RISK ASSESSMENT DOCD: CPT | Mod: HCNC,CPTII,S$GLB, | Performed by: NURSE PRACTITIONER

## 2021-10-06 PROCEDURE — G0439 PPPS, SUBSEQ VISIT: HCPCS | Mod: HCNC,S$GLB,, | Performed by: NURSE PRACTITIONER

## 2021-10-06 PROCEDURE — 99999 PR PBB SHADOW E&M-EST. PATIENT-LVL V: CPT | Mod: PBBFAC,HCNC,, | Performed by: NURSE PRACTITIONER

## 2021-10-06 PROCEDURE — 1101F PT FALLS ASSESS-DOCD LE1/YR: CPT | Mod: HCNC,CPTII,S$GLB, | Performed by: NURSE PRACTITIONER

## 2021-10-06 PROCEDURE — 1159F PR MEDICATION LIST DOCUMENTED IN MEDICAL RECORD: ICD-10-PCS | Mod: HCNC,CPTII,S$GLB, | Performed by: NURSE PRACTITIONER

## 2021-10-06 PROCEDURE — 3288F PR FALLS RISK ASSESSMENT DOCUMENTED: ICD-10-PCS | Mod: HCNC,CPTII,S$GLB, | Performed by: NURSE PRACTITIONER

## 2021-10-06 PROCEDURE — 1170F FXNL STATUS ASSESSED: CPT | Mod: HCNC,CPTII,S$GLB, | Performed by: NURSE PRACTITIONER

## 2021-10-06 PROCEDURE — 1160F RVW MEDS BY RX/DR IN RCRD: CPT | Mod: HCNC,CPTII,S$GLB, | Performed by: NURSE PRACTITIONER

## 2021-10-06 PROCEDURE — 1101F PR PT FALLS ASSESS DOC 0-1 FALLS W/OUT INJ PAST YR: ICD-10-PCS | Mod: HCNC,CPTII,S$GLB, | Performed by: NURSE PRACTITIONER

## 2021-10-06 PROCEDURE — 3078F PR MOST RECENT DIASTOLIC BLOOD PRESSURE < 80 MM HG: ICD-10-PCS | Mod: HCNC,CPTII,S$GLB, | Performed by: NURSE PRACTITIONER

## 2021-10-06 PROCEDURE — 1170F PR FUNCTIONAL STATUS ASSESSED: ICD-10-PCS | Mod: HCNC,CPTII,S$GLB, | Performed by: NURSE PRACTITIONER

## 2021-10-06 PROCEDURE — 99999 PR PBB SHADOW E&M-EST. PATIENT-LVL V: ICD-10-PCS | Mod: PBBFAC,HCNC,, | Performed by: NURSE PRACTITIONER

## 2021-10-06 PROCEDURE — G0439 PR MEDICARE ANNUAL WELLNESS SUBSEQUENT VISIT: ICD-10-PCS | Mod: HCNC,S$GLB,, | Performed by: NURSE PRACTITIONER

## 2021-10-06 PROCEDURE — 1126F AMNT PAIN NOTED NONE PRSNT: CPT | Mod: HCNC,CPTII,S$GLB, | Performed by: NURSE PRACTITIONER

## 2021-10-06 PROCEDURE — 1126F PR PAIN SEVERITY QUANTIFIED, NO PAIN PRESENT: ICD-10-PCS | Mod: HCNC,CPTII,S$GLB, | Performed by: NURSE PRACTITIONER

## 2021-10-06 PROCEDURE — 3074F PR MOST RECENT SYSTOLIC BLOOD PRESSURE < 130 MM HG: ICD-10-PCS | Mod: HCNC,CPTII,S$GLB, | Performed by: NURSE PRACTITIONER

## 2021-10-06 PROCEDURE — 99499 UNLISTED E&M SERVICE: CPT | Mod: S$GLB,,, | Performed by: NURSE PRACTITIONER

## 2021-10-06 PROCEDURE — 3074F SYST BP LT 130 MM HG: CPT | Mod: HCNC,CPTII,S$GLB, | Performed by: NURSE PRACTITIONER

## 2021-10-06 PROCEDURE — 1159F MED LIST DOCD IN RCRD: CPT | Mod: HCNC,CPTII,S$GLB, | Performed by: NURSE PRACTITIONER

## 2021-10-06 PROCEDURE — 99499 RISK ADDL DX/OHS AUDIT: ICD-10-PCS | Mod: S$GLB,,, | Performed by: NURSE PRACTITIONER

## 2021-10-06 RX ORDER — ASPIRIN 81 MG/1
81 TABLET ORAL DAILY
COMMUNITY

## 2021-10-07 ENCOUNTER — TELEPHONE (OUTPATIENT)
Dept: INTERNAL MEDICINE | Facility: CLINIC | Age: 76
End: 2021-10-07

## 2021-10-07 DIAGNOSIS — R30.0 DYSURIA: Primary | ICD-10-CM

## 2021-10-07 DIAGNOSIS — I10 ESSENTIAL HYPERTENSION: Primary | ICD-10-CM

## 2021-10-07 DIAGNOSIS — E78.5 DYSLIPIDEMIA: ICD-10-CM

## 2021-10-08 ENCOUNTER — LAB VISIT (OUTPATIENT)
Dept: LAB | Facility: HOSPITAL | Age: 76
End: 2021-10-08
Attending: INTERNAL MEDICINE
Payer: MEDICARE

## 2021-10-08 DIAGNOSIS — R30.0 DYSURIA: ICD-10-CM

## 2021-10-08 LAB
BACTERIA #/AREA URNS AUTO: ABNORMAL /HPF
BILIRUB UR QL STRIP: NEGATIVE
CLARITY UR REFRACT.AUTO: CLEAR
COLOR UR AUTO: ABNORMAL
GLUCOSE UR QL STRIP: NEGATIVE
HGB UR QL STRIP: NEGATIVE
HYALINE CASTS UR QL AUTO: 28 /LPF
KETONES UR QL STRIP: ABNORMAL
LEUKOCYTE ESTERASE UR QL STRIP: ABNORMAL
MICROSCOPIC COMMENT: ABNORMAL
NITRITE UR QL STRIP: POSITIVE
PH UR STRIP: 6 [PH] (ref 5–8)
PROT UR QL STRIP: NEGATIVE
RBC #/AREA URNS AUTO: 4 /HPF (ref 0–4)
SP GR UR STRIP: 1.02 (ref 1–1.03)
SQUAMOUS #/AREA URNS AUTO: 1 /HPF
URN SPEC COLLECT METH UR: ABNORMAL
WBC #/AREA URNS AUTO: 52 /HPF (ref 0–5)

## 2021-10-08 PROCEDURE — 87186 SC STD MICRODIL/AGAR DIL: CPT | Mod: HCNC | Performed by: INTERNAL MEDICINE

## 2021-10-08 PROCEDURE — 87077 CULTURE AEROBIC IDENTIFY: CPT | Mod: HCNC | Performed by: INTERNAL MEDICINE

## 2021-10-08 PROCEDURE — 81001 URINALYSIS AUTO W/SCOPE: CPT | Mod: HCNC | Performed by: INTERNAL MEDICINE

## 2021-10-08 PROCEDURE — 87088 URINE BACTERIA CULTURE: CPT | Mod: HCNC | Performed by: INTERNAL MEDICINE

## 2021-10-08 PROCEDURE — 87086 URINE CULTURE/COLONY COUNT: CPT | Mod: HCNC | Performed by: INTERNAL MEDICINE

## 2021-10-08 RX ORDER — ALPRAZOLAM 0.25 MG/1
TABLET ORAL
Qty: 20 TABLET | Refills: 1 | Status: SHIPPED | OUTPATIENT
Start: 2021-10-08 | End: 2021-12-10

## 2021-10-08 RX ORDER — NITROFURANTOIN 25; 75 MG/1; MG/1
100 CAPSULE ORAL 2 TIMES DAILY
Qty: 14 CAPSULE | Refills: 0 | Status: SHIPPED | OUTPATIENT
Start: 2021-10-08 | End: 2022-12-08

## 2021-10-11 LAB — BACTERIA UR CULT: ABNORMAL

## 2021-12-10 RX ORDER — ALPRAZOLAM 0.25 MG/1
TABLET ORAL
Qty: 20 TABLET | Refills: 1 | Status: SHIPPED | OUTPATIENT
Start: 2021-12-10 | End: 2022-02-11

## 2022-02-11 RX ORDER — ALPRAZOLAM 0.25 MG/1
TABLET ORAL
Qty: 20 TABLET | Refills: 1 | Status: SHIPPED | OUTPATIENT
Start: 2022-02-11 | End: 2022-03-21

## 2022-02-23 ENCOUNTER — LAB VISIT (OUTPATIENT)
Dept: LAB | Facility: HOSPITAL | Age: 77
End: 2022-02-23
Attending: INTERNAL MEDICINE
Payer: MEDICARE

## 2022-02-23 DIAGNOSIS — E78.5 DYSLIPIDEMIA: ICD-10-CM

## 2022-02-23 DIAGNOSIS — I10 ESSENTIAL HYPERTENSION: ICD-10-CM

## 2022-02-23 LAB
ALBUMIN SERPL BCP-MCNC: 3.6 G/DL (ref 3.5–5.2)
ALP SERPL-CCNC: 68 U/L (ref 55–135)
ALT SERPL W/O P-5'-P-CCNC: 14 U/L (ref 10–44)
ANION GAP SERPL CALC-SCNC: 10 MMOL/L (ref 8–16)
AST SERPL-CCNC: 24 U/L (ref 10–40)
BASOPHILS # BLD AUTO: 0.03 K/UL (ref 0–0.2)
BASOPHILS NFR BLD: 0.5 % (ref 0–1.9)
BILIRUB SERPL-MCNC: 0.5 MG/DL (ref 0.1–1)
BUN SERPL-MCNC: 12 MG/DL (ref 8–23)
CALCIUM SERPL-MCNC: 9.7 MG/DL (ref 8.7–10.5)
CHLORIDE SERPL-SCNC: 103 MMOL/L (ref 95–110)
CHOLEST SERPL-MCNC: 158 MG/DL (ref 120–199)
CHOLEST/HDLC SERPL: 2.6 {RATIO} (ref 2–5)
CO2 SERPL-SCNC: 26 MMOL/L (ref 23–29)
CREAT SERPL-MCNC: 0.8 MG/DL (ref 0.5–1.4)
DIFFERENTIAL METHOD: ABNORMAL
EOSINOPHIL # BLD AUTO: 0.2 K/UL (ref 0–0.5)
EOSINOPHIL NFR BLD: 3.7 % (ref 0–8)
ERYTHROCYTE [DISTWIDTH] IN BLOOD BY AUTOMATED COUNT: 12.9 % (ref 11.5–14.5)
EST. GFR  (AFRICAN AMERICAN): >60 ML/MIN/1.73 M^2
EST. GFR  (NON AFRICAN AMERICAN): >60 ML/MIN/1.73 M^2
GLUCOSE SERPL-MCNC: 99 MG/DL (ref 70–110)
HCT VFR BLD AUTO: 42.2 % (ref 37–48.5)
HDLC SERPL-MCNC: 61 MG/DL (ref 40–75)
HDLC SERPL: 38.6 % (ref 20–50)
HGB BLD-MCNC: 13.3 G/DL (ref 12–16)
IMM GRANULOCYTES # BLD AUTO: 0.02 K/UL (ref 0–0.04)
IMM GRANULOCYTES NFR BLD AUTO: 0.3 % (ref 0–0.5)
LDLC SERPL CALC-MCNC: 77.8 MG/DL (ref 63–159)
LYMPHOCYTES # BLD AUTO: 1.7 K/UL (ref 1–4.8)
LYMPHOCYTES NFR BLD: 28.3 % (ref 18–48)
MCH RBC QN AUTO: 30.9 PG (ref 27–31)
MCHC RBC AUTO-ENTMCNC: 31.5 G/DL (ref 32–36)
MCV RBC AUTO: 98 FL (ref 82–98)
MONOCYTES # BLD AUTO: 0.5 K/UL (ref 0.3–1)
MONOCYTES NFR BLD: 8.2 % (ref 4–15)
NEUTROPHILS # BLD AUTO: 3.5 K/UL (ref 1.8–7.7)
NEUTROPHILS NFR BLD: 59 % (ref 38–73)
NONHDLC SERPL-MCNC: 97 MG/DL
NRBC BLD-RTO: 0 /100 WBC
PLATELET # BLD AUTO: 208 K/UL (ref 150–450)
PMV BLD AUTO: 11.9 FL (ref 9.2–12.9)
POTASSIUM SERPL-SCNC: 4.1 MMOL/L (ref 3.5–5.1)
PROT SERPL-MCNC: 6.8 G/DL (ref 6–8.4)
RBC # BLD AUTO: 4.3 M/UL (ref 4–5.4)
SODIUM SERPL-SCNC: 139 MMOL/L (ref 136–145)
TRIGL SERPL-MCNC: 96 MG/DL (ref 30–150)
TSH SERPL DL<=0.005 MIU/L-ACNC: 2.73 UIU/ML (ref 0.4–4)
WBC # BLD AUTO: 5.97 K/UL (ref 3.9–12.7)

## 2022-02-23 PROCEDURE — 80061 LIPID PANEL: CPT | Mod: HCNC | Performed by: INTERNAL MEDICINE

## 2022-02-23 PROCEDURE — 80053 COMPREHEN METABOLIC PANEL: CPT | Mod: HCNC | Performed by: INTERNAL MEDICINE

## 2022-02-23 PROCEDURE — 85025 COMPLETE CBC W/AUTO DIFF WBC: CPT | Mod: HCNC | Performed by: INTERNAL MEDICINE

## 2022-02-23 PROCEDURE — 84443 ASSAY THYROID STIM HORMONE: CPT | Mod: HCNC | Performed by: INTERNAL MEDICINE

## 2022-02-23 PROCEDURE — 36415 COLL VENOUS BLD VENIPUNCTURE: CPT | Mod: HCNC,PO | Performed by: INTERNAL MEDICINE

## 2022-03-02 ENCOUNTER — OFFICE VISIT (OUTPATIENT)
Dept: INTERNAL MEDICINE | Facility: CLINIC | Age: 77
End: 2022-03-02
Payer: MEDICARE

## 2022-03-02 VITALS
HEART RATE: 56 BPM | TEMPERATURE: 98 F | BODY MASS INDEX: 22.77 KG/M2 | HEIGHT: 65 IN | WEIGHT: 136.69 LBS | OXYGEN SATURATION: 97 % | SYSTOLIC BLOOD PRESSURE: 110 MMHG | DIASTOLIC BLOOD PRESSURE: 68 MMHG

## 2022-03-02 DIAGNOSIS — I10 ESSENTIAL HYPERTENSION: ICD-10-CM

## 2022-03-02 DIAGNOSIS — E78.5 DYSLIPIDEMIA: ICD-10-CM

## 2022-03-02 DIAGNOSIS — Z78.0 POSTMENOPAUSAL: ICD-10-CM

## 2022-03-02 DIAGNOSIS — M85.80 OSTEOPENIA, UNSPECIFIED LOCATION: ICD-10-CM

## 2022-03-02 DIAGNOSIS — Z00.00 ENCOUNTER FOR PREVENTIVE HEALTH EXAMINATION: Primary | ICD-10-CM

## 2022-03-02 PROCEDURE — 3078F DIAST BP <80 MM HG: CPT | Mod: HCNC,CPTII,S$GLB, | Performed by: INTERNAL MEDICINE

## 2022-03-02 PROCEDURE — 99397 PR PREVENTIVE VISIT,EST,65 & OVER: ICD-10-PCS | Mod: HCNC,S$GLB,, | Performed by: INTERNAL MEDICINE

## 2022-03-02 PROCEDURE — 99999 PR PBB SHADOW E&M-EST. PATIENT-LVL V: ICD-10-PCS | Mod: PBBFAC,HCNC,, | Performed by: INTERNAL MEDICINE

## 2022-03-02 PROCEDURE — 1126F AMNT PAIN NOTED NONE PRSNT: CPT | Mod: HCNC,CPTII,S$GLB, | Performed by: INTERNAL MEDICINE

## 2022-03-02 PROCEDURE — 3074F PR MOST RECENT SYSTOLIC BLOOD PRESSURE < 130 MM HG: ICD-10-PCS | Mod: HCNC,CPTII,S$GLB, | Performed by: INTERNAL MEDICINE

## 2022-03-02 PROCEDURE — 1101F PR PT FALLS ASSESS DOC 0-1 FALLS W/OUT INJ PAST YR: ICD-10-PCS | Mod: HCNC,CPTII,S$GLB, | Performed by: INTERNAL MEDICINE

## 2022-03-02 PROCEDURE — 1159F PR MEDICATION LIST DOCUMENTED IN MEDICAL RECORD: ICD-10-PCS | Mod: HCNC,CPTII,S$GLB, | Performed by: INTERNAL MEDICINE

## 2022-03-02 PROCEDURE — 1159F MED LIST DOCD IN RCRD: CPT | Mod: HCNC,CPTII,S$GLB, | Performed by: INTERNAL MEDICINE

## 2022-03-02 PROCEDURE — 3074F SYST BP LT 130 MM HG: CPT | Mod: HCNC,CPTII,S$GLB, | Performed by: INTERNAL MEDICINE

## 2022-03-02 PROCEDURE — 1126F PR PAIN SEVERITY QUANTIFIED, NO PAIN PRESENT: ICD-10-PCS | Mod: HCNC,CPTII,S$GLB, | Performed by: INTERNAL MEDICINE

## 2022-03-02 PROCEDURE — 1101F PT FALLS ASSESS-DOCD LE1/YR: CPT | Mod: HCNC,CPTII,S$GLB, | Performed by: INTERNAL MEDICINE

## 2022-03-02 PROCEDURE — 3288F PR FALLS RISK ASSESSMENT DOCUMENTED: ICD-10-PCS | Mod: HCNC,CPTII,S$GLB, | Performed by: INTERNAL MEDICINE

## 2022-03-02 PROCEDURE — 3078F PR MOST RECENT DIASTOLIC BLOOD PRESSURE < 80 MM HG: ICD-10-PCS | Mod: HCNC,CPTII,S$GLB, | Performed by: INTERNAL MEDICINE

## 2022-03-02 PROCEDURE — 99999 PR PBB SHADOW E&M-EST. PATIENT-LVL V: CPT | Mod: PBBFAC,HCNC,, | Performed by: INTERNAL MEDICINE

## 2022-03-02 PROCEDURE — 99397 PER PM REEVAL EST PAT 65+ YR: CPT | Mod: HCNC,S$GLB,, | Performed by: INTERNAL MEDICINE

## 2022-03-02 PROCEDURE — 3288F FALL RISK ASSESSMENT DOCD: CPT | Mod: HCNC,CPTII,S$GLB, | Performed by: INTERNAL MEDICINE

## 2022-03-02 NOTE — PROGRESS NOTES
History of present illness:  76-year-old lady in today for general health assessment.    Current medications:  All medications are noted and reviewed    Review of systems:  General: no fever, chills, generalized body aches. No unexpected weight loss.  Eyes:  No visual disturbances.  HEENT:  No hoarseness, dysphagia, ear pain.  Respiratory:  No cough, no shortness of breath.  Cardiovascular: no chest pain, palpitations, cough, exertional limb pain. No edema.  GI: no nausea, vomiting.  No abdominal pain. No change in bowel habits.  No melena, no hematochezia.  : no dysuria. No change in the color or character of the urine. No urinary frequency.  Musculoskeletal:  Mild-to-moderate DJD symptoms of both knees.  Not interested in further evaluation at this time.  Neurologic:  No focal neurological complaints.  No headaches.  Skin:  No rashes or other concerns.  Psych:  No emotional issues      Health screenings:  Colonoscopy up-to-date outside of our institution.  Mammogram June 2021.  Gynecologic care is up-to-date.  Bone densitometry December 2019.  She has had both pneumococcal vaccines.  She has had the COVID vaccines.  Eye exam is up-to-date.      Physical examination:  GENERAL:  Alert, appropriately groomed, no acute distress.  VS:  Blood pressure 110 over a 68  EYES: sclerae white ,nonicteric. PERRL.  HEENT:  Normocephalic. Ear canals and tympanic membranes normal. Mouth and pharynx normal. No thyromegaly. Trachea midline and freely mobile.  LUNGS:  Clear to ascultation and normal to percussion.  CARDIOVASCULAR:  Normal heart sounds.  No significant murmur. Carotids full bilaterally without bruit.  Pedal pulses intact .  No abdominal bruit.  No peripheral extremity edema.  GI: the abdomen is soft, no distension. No masses , tenderness, organomegaly.    LYMPHATIC:  No axillary, inguinal , cervical adenopathy.  MUSCULOSKELETAL:  Range of motion, stability and strength of the right and left upper and lower  extremities normal. No swollen or tender joints  NEUROLOGIC:  DTR's normal. No gross motor or sensory deficits apparent, gait normal.  SKIN:  No rashes.   MS:  Alert, oriented , affect and mood all appropriate      Data:  Recent health maintenance laboratory data all noted and reviewed and reasonable.        Impression:  Generally healthy 76 year lady with a couple of chronic medical issues.    Hypertension is well controlled.    Dyslipidemia on statin therapy.    Osteopenia not currently on pharmacologic therapy due to patient's preference .  She did have some issues with oral bisphosphonate therapy in the past          Plan:  Update bone densitometry.  Referral to Endocrinology for review and recommendations.  Continue current pharmacologic regimens.  Return to clinic six months follow-up.

## 2022-03-15 ENCOUNTER — OFFICE VISIT (OUTPATIENT)
Dept: ENDOCRINOLOGY | Facility: CLINIC | Age: 77
End: 2022-03-15
Payer: MEDICARE

## 2022-03-15 VITALS
DIASTOLIC BLOOD PRESSURE: 78 MMHG | SYSTOLIC BLOOD PRESSURE: 116 MMHG | HEIGHT: 63 IN | WEIGHT: 135.25 LBS | BODY MASS INDEX: 23.96 KG/M2 | RESPIRATION RATE: 18 BRPM | HEART RATE: 67 BPM | OXYGEN SATURATION: 97 %

## 2022-03-15 DIAGNOSIS — E55.9 VITAMIN D DEFICIENCY: ICD-10-CM

## 2022-03-15 DIAGNOSIS — M81.0 OSTEOPOROSIS, POSTMENOPAUSAL: Primary | ICD-10-CM

## 2022-03-15 DIAGNOSIS — M85.80 OSTEOPENIA, UNSPECIFIED LOCATION: ICD-10-CM

## 2022-03-15 DIAGNOSIS — M81.0 POSTMENOPAUSAL OSTEOPOROSIS: ICD-10-CM

## 2022-03-15 DIAGNOSIS — K21.9 GASTROESOPHAGEAL REFLUX DISEASE, UNSPECIFIED WHETHER ESOPHAGITIS PRESENT: ICD-10-CM

## 2022-03-15 PROCEDURE — 3288F FALL RISK ASSESSMENT DOCD: CPT | Mod: CPTII,S$GLB,, | Performed by: INTERNAL MEDICINE

## 2022-03-15 PROCEDURE — 1101F PR PT FALLS ASSESS DOC 0-1 FALLS W/OUT INJ PAST YR: ICD-10-PCS | Mod: CPTII,S$GLB,, | Performed by: INTERNAL MEDICINE

## 2022-03-15 PROCEDURE — 1126F PR PAIN SEVERITY QUANTIFIED, NO PAIN PRESENT: ICD-10-PCS | Mod: CPTII,S$GLB,, | Performed by: INTERNAL MEDICINE

## 2022-03-15 PROCEDURE — 99204 OFFICE O/P NEW MOD 45 MIN: CPT | Mod: S$GLB,,, | Performed by: INTERNAL MEDICINE

## 2022-03-15 PROCEDURE — 3078F PR MOST RECENT DIASTOLIC BLOOD PRESSURE < 80 MM HG: ICD-10-PCS | Mod: CPTII,S$GLB,, | Performed by: INTERNAL MEDICINE

## 2022-03-15 PROCEDURE — 1126F AMNT PAIN NOTED NONE PRSNT: CPT | Mod: CPTII,S$GLB,, | Performed by: INTERNAL MEDICINE

## 2022-03-15 PROCEDURE — 3078F DIAST BP <80 MM HG: CPT | Mod: CPTII,S$GLB,, | Performed by: INTERNAL MEDICINE

## 2022-03-15 PROCEDURE — 1101F PT FALLS ASSESS-DOCD LE1/YR: CPT | Mod: CPTII,S$GLB,, | Performed by: INTERNAL MEDICINE

## 2022-03-15 PROCEDURE — 99204 PR OFFICE/OUTPT VISIT, NEW, LEVL IV, 45-59 MIN: ICD-10-PCS | Mod: S$GLB,,, | Performed by: INTERNAL MEDICINE

## 2022-03-15 PROCEDURE — 1159F PR MEDICATION LIST DOCUMENTED IN MEDICAL RECORD: ICD-10-PCS | Mod: CPTII,S$GLB,, | Performed by: INTERNAL MEDICINE

## 2022-03-15 PROCEDURE — 3074F SYST BP LT 130 MM HG: CPT | Mod: CPTII,S$GLB,, | Performed by: INTERNAL MEDICINE

## 2022-03-15 PROCEDURE — 99999 PR PBB SHADOW E&M-EST. PATIENT-LVL V: ICD-10-PCS | Mod: PBBFAC,,, | Performed by: INTERNAL MEDICINE

## 2022-03-15 PROCEDURE — 3288F PR FALLS RISK ASSESSMENT DOCUMENTED: ICD-10-PCS | Mod: CPTII,S$GLB,, | Performed by: INTERNAL MEDICINE

## 2022-03-15 PROCEDURE — 1159F MED LIST DOCD IN RCRD: CPT | Mod: CPTII,S$GLB,, | Performed by: INTERNAL MEDICINE

## 2022-03-15 PROCEDURE — 3074F PR MOST RECENT SYSTOLIC BLOOD PRESSURE < 130 MM HG: ICD-10-PCS | Mod: CPTII,S$GLB,, | Performed by: INTERNAL MEDICINE

## 2022-03-15 PROCEDURE — 99999 PR PBB SHADOW E&M-EST. PATIENT-LVL V: CPT | Mod: PBBFAC,,, | Performed by: INTERNAL MEDICINE

## 2022-03-15 NOTE — ASSESSMENT & PLAN NOTE
She believes that she is taking 500 IU of vitamin-D. Will check vitamin-D level to determine if she needs an adjustment.

## 2022-03-15 NOTE — ASSESSMENT & PLAN NOTE
Patient reports having a very sensitive stomach and previously did not tolerate Fosamax due to reflux and dyspepsia.  Will avoid oral bisphosphonates moving forward.

## 2022-03-15 NOTE — PROGRESS NOTES
NEW PATIENT VISIT    Subjective:      Chief Complaint:     HPI: Nesha Landa is a 76 y.o. female who is here for an initial evaluation for osteoporosis      Past Medical History:   Diagnosis Date    Coronary arteriosclerosis     HTN (hypertension)     Hypercholesteremia     LBBB (left bundle branch block)     Osteopenia        With regards to osteoporosis:     Diagnosed with osteoporosis in 2019 based on low bone mass + high FRAX    Past osteoporosis medication: Fosamax - took for short while but was having significant stomach issues so she couldn't take it.  Current osteoporosis medication: none    Calcium intake?  [employer] - 3 tabs per day - not sure if carbonate/citrate  Vit D3 intake?  500 IU per day   Weight bearing exercise?   Therapy for right knee; was doing senior program at [facility] but it shut down with COVID.  Sense of balance? good  Recent falls? no  Patient is using the following assist devices for ambulation: none  Height loss (>2 inches)? Measured lower but not sure if she truly lost two inches    Fracture history:  None    Tobacco use?  no  EtOH use?   no     Current diarrhea or h/o malabsorption? +Lactose intolerant; needs to eat bland foods or she has issues.  GERD or stomach ulcers? yes  Thyroid disease? no  Anemia? no  Kidney Stones? No, but has two sons with kidney stones; dad had kidney stones.    High risk medications include:  none    Post-menopausal? Age?: partial hysterectomy in 30s (endometriosis). No menopausal symptoms.  ERT after menopause?: no    Mom or dad with hip/spine fracture or diagnosed with osteoporosis?: no     Malignancy involving bone (active or history)? no  Prior radiation treatment? no  Unexplained elevation of alkaline phosphatase? no    Dental work planned? no    Lab Results   Component Value Date    KQPSUTUZ95LM 33 07/07/2014    XXDDPCIX06QT 26 (L) 05/17/2012    EGODBQNG83HH 41 11/24/2009    CALCIUM 9.7 02/23/2022    CALCIUM 9.8 07/09/2021    CALCIUM 10.5  10/27/2020    PHOS 2.3 (L) 11/20/2005    ALKPHOS 68 02/23/2022    ALKPHOS 62 07/09/2021    ALKPHOS 69 10/27/2020    TSH 2.735 02/23/2022         No results found for: CTELOPEPTIDE, PROCOLLAGEN        DXA (Year)  Location 2014  ([place]) 2017  [place] 2019  [place]) Percent change from previous   L-spine T-score -0.8 -0.9 -1.0 ([facility] since 2017)   Total Hip T-score -0.6 -0.9 -0.8 ([facility] since 2017)   Femoral Neck T-score -2.0 -2.2 -2.4    FRAX (MOF  /  Hip) 12%  /  2.5% 14%  /  3.6% 16%  /  4.9%          Reviewed past medical, family, social history and updated as appropriate.    Objective:     Vitals:    03/15/22 0930   BP: 116/78   Pulse: 67   Resp: 18         BP Readings from Last 5 Encounters:   03/15/22 116/78   03/02/22 110/68   10/06/21 120/72   08/16/21 110/74   07/15/21 128/76         Physical Exam  Vitals and nursing note reviewed.   Constitutional:       General: She is not in acute distress.     Appearance: She is well-developed.   HENT:      Head: Normocephalic and atraumatic.   Eyes:      General:         Right eye: No discharge.         Left eye: No discharge.      Conjunctiva/sclera: Conjunctivae normal.   Neck:      Thyroid: No thyromegaly.      Trachea: No tracheal deviation.   Cardiovascular:      Rate and Rhythm: Normal rate.   Pulmonary:      Effort: Pulmonary effort is normal. No respiratory distress.   Musculoskeletal:      Comments: No digital clubbing or extremity cyanosis  Thin skeletal frame   Skin:     Comments: Thin skin   Neurological:      General: No focal deficit present.      Mental Status: She is alert and oriented to person, place, and time.      Coordination: Coordination normal.   Psychiatric:         Mood and Affect: Mood normal.         Behavior: Behavior normal.           Wt Readings from Last 30 Encounters:   03/15/22 0930 61.4 kg (135 lb 4 oz)   03/02/22 1059 62 kg (136 lb 11 oz)   10/06/21 0858 61.2 kg (134 lb 14.7 oz)   08/16/21 0852 62.5 kg (137 lb 12.6 oz)   07/15/21  1100 63.5 kg (139 lb 15.9 oz)   07/10/21 0912 64.4 kg (142 lb)   12/14/20 1455 65.7 kg (144 lb 13.5 oz)   11/25/20 0955 64.4 kg (141 lb 15.6 oz)   10/27/20 1021 65.7 kg (144 lb 13.5 oz)   08/27/20 0823 67.2 kg (148 lb 2.4 oz)   07/24/20 1047 68.5 kg (151 lb)   07/10/20 1712 68.5 kg (151 lb 0.2 oz)   06/15/20 1247 68.9 kg (152 lb)   06/13/20 1737 68.9 kg (152 lb)   05/26/20 0947 69.2 kg (152 lb 8.9 oz)   01/15/20 0825 68.6 kg (151 lb 3.8 oz)   11/15/19 1254 66.3 kg (146 lb 2.6 oz)   07/18/19 1453 64.5 kg (142 lb 3.2 oz)   04/16/19 1318 63 kg (138 lb 14.2 oz)   01/28/19 1453 65.7 kg (144 lb 13.5 oz)   12/06/18 1016 64.5 kg (142 lb 3.2 oz)   11/13/18 1001 65.9 kg (145 lb 4.5 oz)   03/07/18 0825 68 kg (150 lb)   09/05/17 0849 67.5 kg (148 lb 13 oz)   08/22/17 1143 67.8 kg (149 lb 5.8 oz)   11/14/16 1514 69.1 kg (152 lb 5.4 oz)   08/09/16 0811 69.4 kg (153 lb)   06/28/16 0919 69.5 kg (153 lb 3.5 oz)   12/10/15 1008 70 kg (154 lb 5.2 oz)   09/14/15 1558 71.7 kg (158 lb 1.6 oz)       Lab Results   Component Value Date    HGBA1C 6.1 05/17/2012     Lab Results   Component Value Date    CHOL 158 02/23/2022    HDL 61 02/23/2022    LDLCALC 77.8 02/23/2022    TRIG 96 02/23/2022    CHOLHDL 38.6 02/23/2022     Lab Results   Component Value Date     02/23/2022    K 4.1 02/23/2022     02/23/2022    CO2 26 02/23/2022    GLU 99 02/23/2022    BUN 12 02/23/2022    CREATININE 0.8 02/23/2022    CALCIUM 9.7 02/23/2022    PROT 6.8 02/23/2022    ALBUMIN 3.6 02/23/2022    BILITOT 0.5 02/23/2022    ALKPHOS 68 02/23/2022    AST 24 02/23/2022    ALT 14 02/23/2022    ANIONGAP 10 02/23/2022    ESTGFRAFRICA >60.0 02/23/2022    EGFRNONAA >60.0 02/23/2022    TSH 2.735 02/23/2022      No results found for: MICALBCREAT    Assessment/Plan:     Postmenopausal osteoporosis  Risk factors include low BMI,  race, early surgical menopause    Reviewed basics of quantity versus quality  Reassuring she is not fracturing     Labs not  suggestive of a secondary cause of osteoporosis.  Recheck vitamin-D     RDA of calcium 7984-7224 mg/day - Calcium from food sources preferred; recommended OTC calcium supplement if she decides to limit dairy intake.  RDA Vitamin D3 - 1000- 2000 IU/day discussed    Fall precautions emphasized  Weight bearing exercises encouraged    Treatment options and potential side effects discussed for Reclast and Prolia.    Discussed the risk of osteonecrosis of the jaw with bisphosphonates and denosumab, with incidence estimated from 1-10/786028 treated patients. Discussed that the incidence of ONJ is higher in patients undergoing invasive dental surgery (excludes routine cleaning, fillings or root canals). Dental work should ideally be completed prior to initiation of treatment; and to alert your dentist/oral surgeon if you are planning any invasive dental work while on one of these agents. Preventative measures such as good oral hygiene are encouraged.    Discussed the risk of atypical femur fractures with bisphosphonates and denosumab. The exact incidence is unknown, but has been estimated at approximately 1 in 08148 patients treated with oral bisphosphonates and increasing incidence was correlated with increased duration of bisphosphonate use. Despite this risk, the significant benefit on fracture reduction far outweighs the potential for this rare event.    Will update DXA later this week and also check vitamin-D.  Anticipate starting Reclast yearly for three doses.    Vitamin D deficiency  She believes that she is taking 500 IU of vitamin-D. Will check vitamin-D level to determine if she needs an adjustment.    GERD (gastroesophageal reflux disease)  Patient reports having a very sensitive stomach and previously did not tolerate Fosamax due to reflux and dyspepsia.  Will avoid oral bisphosphonates moving forward.      Follow up in about 1 year (around 3/15/2023).

## 2022-03-15 NOTE — ASSESSMENT & PLAN NOTE
Risk factors include low BMI,  race, early surgical menopause    Reviewed basics of quantity versus quality  Reassuring she is not fracturing     Labs not suggestive of a secondary cause of osteoporosis.  Recheck vitamin-D     RDA of calcium 8468-2399 mg/day - Calcium from food sources preferred; recommended OTC calcium supplement if she decides to limit dairy intake.  RDA Vitamin D3 - 1000- 2000 IU/day discussed    Fall precautions emphasized  Weight bearing exercises encouraged    Treatment options and potential side effects discussed for Reclast and Prolia.    Discussed the risk of osteonecrosis of the jaw with bisphosphonates and denosumab, with incidence estimated from 1-10/127794 treated patients. Discussed that the incidence of ONJ is higher in patients undergoing invasive dental surgery (excludes routine cleaning, fillings or root canals). Dental work should ideally be completed prior to initiation of treatment; and to alert your dentist/oral surgeon if you are planning any invasive dental work while on one of these agents. Preventative measures such as good oral hygiene are encouraged.    Discussed the risk of atypical femur fractures with bisphosphonates and denosumab. The exact incidence is unknown, but has been estimated at approximately 1 in 29336 patients treated with oral bisphosphonates and increasing incidence was correlated with increased duration of bisphosphonate use. Despite this risk, the significant benefit on fracture reduction far outweighs the potential for this rare event.    Will update DXA later this week and also check vitamin-D.  Anticipate starting Reclast yearly for three doses.

## 2022-03-18 ENCOUNTER — APPOINTMENT (OUTPATIENT)
Dept: RADIOLOGY | Facility: CLINIC | Age: 77
End: 2022-03-18
Attending: INTERNAL MEDICINE
Payer: MEDICARE

## 2022-03-18 DIAGNOSIS — Z78.0 POSTMENOPAUSAL: ICD-10-CM

## 2022-03-18 PROCEDURE — 77080 DXA BONE DENSITY AXIAL: CPT | Mod: TC,PO

## 2022-03-18 PROCEDURE — 77080 DEXA BONE DENSITY SPINE HIP: ICD-10-PCS | Mod: 26,,, | Performed by: INTERNAL MEDICINE

## 2022-03-18 PROCEDURE — 77080 DXA BONE DENSITY AXIAL: CPT | Mod: 26,,, | Performed by: INTERNAL MEDICINE

## 2022-03-21 RX ORDER — ALPRAZOLAM 0.25 MG/1
TABLET ORAL
Qty: 20 TABLET | Refills: 1 | Status: SHIPPED | OUTPATIENT
Start: 2022-03-21 | End: 2022-06-06

## 2022-03-22 ENCOUNTER — TELEPHONE (OUTPATIENT)
Dept: ENDOCRINOLOGY | Facility: CLINIC | Age: 77
End: 2022-03-22
Payer: MEDICARE

## 2022-03-22 RX ORDER — ZOLEDRONIC ACID 5 MG/100ML
5 INJECTION, SOLUTION INTRAVENOUS
Status: CANCELLED | OUTPATIENT
Start: 2022-03-22

## 2022-03-22 RX ORDER — HEPARIN 100 UNIT/ML
500 SYRINGE INTRAVENOUS
Status: CANCELLED | OUTPATIENT
Start: 2022-03-22

## 2022-03-22 RX ORDER — SODIUM CHLORIDE 0.9 % (FLUSH) 0.9 %
10 SYRINGE (ML) INJECTION
Status: CANCELLED | OUTPATIENT
Start: 2022-03-22

## 2022-03-22 RX ORDER — ACETAMINOPHEN 500 MG
500 TABLET ORAL
Status: CANCELLED | OUTPATIENT
Start: 2022-03-22

## 2022-03-22 NOTE — TELEPHONE ENCOUNTER
I spoke with Ms. Landa. Discussed the vitamin D and bone density results (official report still pending). I recommended Reclast yearly x 3 years.  I discussed the common and rare side effects related to Reclast.  She is agreeable to proceed.  Will send prescription for insurance approval.

## 2022-03-25 RX ORDER — CARVEDILOL 25 MG/1
TABLET ORAL
Qty: 180 TABLET | Refills: 3 | Status: SHIPPED | OUTPATIENT
Start: 2022-03-25 | End: 2023-01-25

## 2022-04-06 ENCOUNTER — OFFICE VISIT (OUTPATIENT)
Dept: OBSTETRICS AND GYNECOLOGY | Facility: CLINIC | Age: 77
End: 2022-04-06
Payer: MEDICARE

## 2022-04-06 VITALS
HEIGHT: 63 IN | WEIGHT: 136.69 LBS | DIASTOLIC BLOOD PRESSURE: 86 MMHG | SYSTOLIC BLOOD PRESSURE: 142 MMHG | BODY MASS INDEX: 24.22 KG/M2

## 2022-04-06 DIAGNOSIS — Z78.0 POSTMENOPAUSAL STATUS: ICD-10-CM

## 2022-04-06 DIAGNOSIS — Z90.710 HISTORY OF HYSTERECTOMY: ICD-10-CM

## 2022-04-06 DIAGNOSIS — Z12.31 VISIT FOR SCREENING MAMMOGRAM: ICD-10-CM

## 2022-04-06 DIAGNOSIS — Z01.419 WOMEN'S ANNUAL ROUTINE GYNECOLOGICAL EXAMINATION: Primary | ICD-10-CM

## 2022-04-06 PROCEDURE — 1160F PR REVIEW ALL MEDS BY PRESCRIBER/CLIN PHARMACIST DOCUMENTED: ICD-10-PCS | Mod: CPTII,S$GLB,, | Performed by: OBSTETRICS & GYNECOLOGY

## 2022-04-06 PROCEDURE — G0101 PR CA SCREEN;PELVIC/BREAST EXAM: ICD-10-PCS | Mod: S$GLB,,, | Performed by: OBSTETRICS & GYNECOLOGY

## 2022-04-06 PROCEDURE — 1101F PR PT FALLS ASSESS DOC 0-1 FALLS W/OUT INJ PAST YR: ICD-10-PCS | Mod: CPTII,S$GLB,, | Performed by: OBSTETRICS & GYNECOLOGY

## 2022-04-06 PROCEDURE — 99999 PR PBB SHADOW E&M-EST. PATIENT-LVL IV: ICD-10-PCS | Mod: PBBFAC,,, | Performed by: OBSTETRICS & GYNECOLOGY

## 2022-04-06 PROCEDURE — 3288F FALL RISK ASSESSMENT DOCD: CPT | Mod: CPTII,S$GLB,, | Performed by: OBSTETRICS & GYNECOLOGY

## 2022-04-06 PROCEDURE — 3077F SYST BP >= 140 MM HG: CPT | Mod: CPTII,S$GLB,, | Performed by: OBSTETRICS & GYNECOLOGY

## 2022-04-06 PROCEDURE — 99999 PR PBB SHADOW E&M-EST. PATIENT-LVL IV: CPT | Mod: PBBFAC,,, | Performed by: OBSTETRICS & GYNECOLOGY

## 2022-04-06 PROCEDURE — 1125F AMNT PAIN NOTED PAIN PRSNT: CPT | Mod: CPTII,S$GLB,, | Performed by: OBSTETRICS & GYNECOLOGY

## 2022-04-06 PROCEDURE — G0101 CA SCREEN;PELVIC/BREAST EXAM: HCPCS | Mod: S$GLB,,, | Performed by: OBSTETRICS & GYNECOLOGY

## 2022-04-06 PROCEDURE — 1159F PR MEDICATION LIST DOCUMENTED IN MEDICAL RECORD: ICD-10-PCS | Mod: CPTII,S$GLB,, | Performed by: OBSTETRICS & GYNECOLOGY

## 2022-04-06 PROCEDURE — 3079F PR MOST RECENT DIASTOLIC BLOOD PRESSURE 80-89 MM HG: ICD-10-PCS | Mod: CPTII,S$GLB,, | Performed by: OBSTETRICS & GYNECOLOGY

## 2022-04-06 PROCEDURE — 3079F DIAST BP 80-89 MM HG: CPT | Mod: CPTII,S$GLB,, | Performed by: OBSTETRICS & GYNECOLOGY

## 2022-04-06 PROCEDURE — 1101F PT FALLS ASSESS-DOCD LE1/YR: CPT | Mod: CPTII,S$GLB,, | Performed by: OBSTETRICS & GYNECOLOGY

## 2022-04-06 PROCEDURE — 1159F MED LIST DOCD IN RCRD: CPT | Mod: CPTII,S$GLB,, | Performed by: OBSTETRICS & GYNECOLOGY

## 2022-04-06 PROCEDURE — 1125F PR PAIN SEVERITY QUANTIFIED, PAIN PRESENT: ICD-10-PCS | Mod: CPTII,S$GLB,, | Performed by: OBSTETRICS & GYNECOLOGY

## 2022-04-06 PROCEDURE — 1160F RVW MEDS BY RX/DR IN RCRD: CPT | Mod: CPTII,S$GLB,, | Performed by: OBSTETRICS & GYNECOLOGY

## 2022-04-06 PROCEDURE — 3288F PR FALLS RISK ASSESSMENT DOCUMENTED: ICD-10-PCS | Mod: CPTII,S$GLB,, | Performed by: OBSTETRICS & GYNECOLOGY

## 2022-04-06 PROCEDURE — 3077F PR MOST RECENT SYSTOLIC BLOOD PRESSURE >= 140 MM HG: ICD-10-PCS | Mod: CPTII,S$GLB,, | Performed by: OBSTETRICS & GYNECOLOGY

## 2022-04-06 RX ORDER — CEPHALEXIN 500 MG/1
CAPSULE ORAL
COMMUNITY
Start: 2022-02-11 | End: 2022-11-26 | Stop reason: ALTCHOICE

## 2022-04-06 RX ORDER — INFLUENZA VACCINE, ADJUVANTED 15; 15; 15; 15 UG/.5ML; UG/.5ML; UG/.5ML; UG/.5ML
INJECTION, SUSPENSION INTRAMUSCULAR
COMMUNITY
Start: 2021-10-12 | End: 2022-12-08 | Stop reason: ALTCHOICE

## 2022-04-06 NOTE — PROGRESS NOTES
Nesha Landa is a 76 y.o. year old  who presents for annual exam.  S/P DAO for endometriosis, not on hormones.  Denies bleeding but describes an occasional hot flash at night.  She is seeing Endocrinology for evaluation of osteoporosis and plans to begin Reclast.  No GYN complaints.    21 Mammogram: Negative, TC 1.8    Past Medical History:   Diagnosis Date    Coronary arteriosclerosis     HTN (hypertension)     Hypercholesteremia     LBBB (left bundle branch block)     Osteopenia        Past Surgical History:   Procedure Laterality Date    CATARACT EXTRACTION      10 years+ OU    HYSTERECTOMY      partial colectomy         OB History        10    Para   5    Term   5            AB        Living   5       SAB        IAB        Ectopic        Multiple        Live Births   5                 ROS:  GENERAL: Feeling well overall.   SKIN: Denies rash or lesions.   HEAD: Denies head injury or headache.   NODES: Denies enlarged lymph nodes.   CHEST: Denies chest pain or shortness of breath.   CARDIOVASCULAR: Denies palpitations or left sided chest pain.   ABDOMEN: No abdominal pain.  Reports occasional constipation / diarrhea.  URINARY: No dysuria or hematuria.  REPRODUCTIVE: See HPI.   BREASTS: Denies pain, lumps, or nipple discharge.   HEMATOLOGIC: No easy bruisability or excessive bleeding.   MUSCULOSKELETAL: Reports some knee and low back discomfort.   NEUROLOGIC: Denies syncope or weakness.   PSYCHIATRIC: Denies depression.    PE:  (chaperone present during entire exam)  APPEARANCE: Well nourished, well developed, in no acute distress.  NODES: No inguinal lymph node enlargement.  ABDOMEN: Soft. No tenderness or masses. No hernias.  BREASTS: Symmetrical, no skin changes or visible lesions. No palpable masses, nipple discharge or adenopathy bilaterally.  PELVIC: Atrophic external female genitalia without lesions. Normal hair distribution. Adequate perineal body, normal urethral meatus.  Vagina atrophic without lesions or discharge. No significant cystocele or rectocele. Uterus and cervix surgically absent. Bimanual exam revealed no masses, tenderness or abnormality.  ANUS: Normal.  Thin yellow spec, single digit BME    Diagnosis:  1. Women's annual routine gynecological examination    2. Postmenopausal status    3. History of hysterectomy    4. Visit for screening mammogram          PLAN:    Orders Placed This Encounter    Mammo Digital Screening Bilat w/ [facility]       Patient was counseled today on postmenopausal issues.     Follow-up in 1 year.

## 2022-05-04 ENCOUNTER — INFUSION (OUTPATIENT)
Dept: INFECTIOUS DISEASES | Facility: HOSPITAL | Age: 77
End: 2022-05-04
Attending: INTERNAL MEDICINE
Payer: MEDICARE

## 2022-05-04 ENCOUNTER — NURSE TRIAGE (OUTPATIENT)
Dept: ADMINISTRATIVE | Facility: CLINIC | Age: 77
End: 2022-05-04
Payer: MEDICARE

## 2022-05-04 VITALS
OXYGEN SATURATION: 94 % | WEIGHT: 136.69 LBS | TEMPERATURE: 98 F | RESPIRATION RATE: 16 BRPM | SYSTOLIC BLOOD PRESSURE: 153 MMHG | DIASTOLIC BLOOD PRESSURE: 68 MMHG | HEART RATE: 50 BPM | BODY MASS INDEX: 24.21 KG/M2

## 2022-05-04 DIAGNOSIS — M81.0 POSTMENOPAUSAL OSTEOPOROSIS: Primary | ICD-10-CM

## 2022-05-04 PROCEDURE — 63600175 PHARM REV CODE 636 W HCPCS: Performed by: INTERNAL MEDICINE

## 2022-05-04 PROCEDURE — 25000003 PHARM REV CODE 250: Performed by: INTERNAL MEDICINE

## 2022-05-04 PROCEDURE — 96365 THER/PROPH/DIAG IV INF INIT: CPT

## 2022-05-04 RX ORDER — HEPARIN 100 UNIT/ML
500 SYRINGE INTRAVENOUS
Status: DISCONTINUED | OUTPATIENT
Start: 2022-05-04 | End: 2022-05-04 | Stop reason: HOSPADM

## 2022-05-04 RX ORDER — ZOLEDRONIC ACID 5 MG/100ML
5 INJECTION, SOLUTION INTRAVENOUS
Status: CANCELLED | OUTPATIENT
Start: 2022-05-04

## 2022-05-04 RX ORDER — HEPARIN 100 UNIT/ML
500 SYRINGE INTRAVENOUS
Status: CANCELLED | OUTPATIENT
Start: 2022-05-04

## 2022-05-04 RX ORDER — SODIUM CHLORIDE 0.9 % (FLUSH) 0.9 %
10 SYRINGE (ML) INJECTION
Status: DISCONTINUED | OUTPATIENT
Start: 2022-05-04 | End: 2022-05-04 | Stop reason: HOSPADM

## 2022-05-04 RX ORDER — ZOLEDRONIC ACID 5 MG/100ML
5 INJECTION, SOLUTION INTRAVENOUS
Status: COMPLETED | OUTPATIENT
Start: 2022-05-04 | End: 2022-05-04

## 2022-05-04 RX ORDER — SODIUM CHLORIDE 0.9 % (FLUSH) 0.9 %
10 SYRINGE (ML) INJECTION
Status: CANCELLED | OUTPATIENT
Start: 2022-05-04

## 2022-05-04 RX ORDER — ACETAMINOPHEN 500 MG
500 TABLET ORAL
Status: DISCONTINUED | OUTPATIENT
Start: 2022-05-04 | End: 2022-05-04 | Stop reason: HOSPADM

## 2022-05-04 RX ORDER — ACETAMINOPHEN 500 MG
500 TABLET ORAL
Status: CANCELLED | OUTPATIENT
Start: 2022-05-04

## 2022-05-04 RX ADMIN — SODIUM CHLORIDE: 0.9 INJECTION, SOLUTION INTRAVENOUS at 10:05

## 2022-05-04 RX ADMIN — ZOLEDRONIC ACID 5 MG: 5 INJECTION, SOLUTION INTRAVENOUS at 10:05

## 2022-05-04 NOTE — PROGRESS NOTES
Pt received reclast infusion, pt took Tylenol 650mg prior to arrival, pt tolerated well & left in NAD

## 2022-05-05 ENCOUNTER — TELEPHONE (OUTPATIENT)
Dept: INTERNAL MEDICINE | Facility: CLINIC | Age: 77
End: 2022-05-05
Payer: MEDICARE

## 2022-05-05 ENCOUNTER — TELEPHONE (OUTPATIENT)
Dept: ENDOCRINOLOGY | Facility: CLINIC | Age: 77
End: 2022-05-05
Payer: MEDICARE

## 2022-05-05 NOTE — TELEPHONE ENCOUNTER
Spoke with pt to advise.    Verbalized understanding and advises that the diarrhea has resolved but she still has a h/a and body aches.    Please advise.    Thank you.

## 2022-05-05 NOTE — TELEPHONE ENCOUNTER
----- Message from LALY Angeles MD sent at 5/5/2022  5:18 PM CDT -----  Regarding: message  Unable to route prior message as sent.  Advise her symptoms of headache , body ache will resolve. She can take tylenol if needed.  pwb

## 2022-05-05 NOTE — TELEPHONE ENCOUNTER
Spoke with patient, gave information regarding Reclast per MD's request. Patient verbalized understanding.

## 2022-05-05 NOTE — TELEPHONE ENCOUNTER
----- Message from Norma Logan sent at 5/5/2022  7:38 AM CDT -----  Type:  Needs Medical Advice    Who Called: #self  Reason:speak with nurse :)  Would the patient rather a call back or a response via [facility]? call  Best Call Back Number: [phone]  Additional Information: none

## 2022-05-05 NOTE — TELEPHONE ENCOUNTER
----- Message from Norma Logan sent at 5/5/2022  7:38 AM CDT -----  Type:  Needs Medical Advice    Who Called: #self  Reason:speak with nurse :)  Would the patient rather a call back or a response via [facility]? call  Best Call Back Number: [phone]  Additional Information: none    Spoke with patient, states she had infusion done yesterday. C/O diarrhea on yesterday but does not have it today. Also states she has not eaten anything today. Would like to know if diarrhea is one of the side effects. Please advise

## 2022-05-05 NOTE — TELEPHONE ENCOUNTER
Patient states she had an infusion this AM for osteoporosis and has had 3-4 episodes of diarrhea since returning home. Patient states she has taken Immodium to resolve. Patient inquiring if diarrhea is a side effect of her infusion. Patient denies fever, abdominal pain, nausea/vomiting.     Care Advice given per Diarrhea (Adult) Guideline. Patient states understanding of Care Advice and cites no additional concerns at this time.     Reason for Disposition   [1] MODERATE diarrhea (e.g., 4-6 times / day more than normal) AND [2] age > 70 years    Additional Information   Negative: Shock suspected (e.g., cold/pale/clammy skin, too weak to stand, low BP, rapid pulse)   Negative: Difficult to awaken or acting confused (e.g., disoriented, slurred speech)   Negative: Sounds like a life-threatening emergency to the triager   Negative: Vomiting also present and worse than the diarrhea   Negative: [1] Blood in stool AND [2] without diarrhea   Negative: Diarrhea in a cancer patient who is currently (or recently) receiving chemotherapy or radiation therapy, or cancer patient who has metastatic or end-stage cancer and is receiving palliative care   Negative: [1] SEVERE abdominal pain (e.g., excruciating) AND [2] present > 1 hour   Negative: [1] SEVERE abdominal pain AND [2] age > 60 years   Negative: [1] Blood in the stool AND [2] moderate or large amount of blood   Negative: Black or tarry bowel movements (Exception: chronic-unchanged black-grey bowel movements AND is taking iron pills or Pepto-bismol)   Negative: [1] Drinking very little AND [2] dehydration suspected (e.g., no urine > 12 hours, very dry mouth, very lightheaded)   Negative: Patient sounds very sick or weak to the triager   Negative: [1] SEVERE diarrhea (e.g., 7 or more times / day more than normal) AND [2] age > 60 years   Negative: [1] Constant abdominal pain AND [2] present > 2 hours   Negative: [1] Fever > 103 F (39.4 C) AND [2] not able to  get the fever down using Fever Care Advice   Negative: [1] SEVERE diarrhea (e.g., 7 or more times / day more than normal) AND [2] present > 24 hours (1 day)   Negative: [1] MODERATE diarrhea (e.g., 4-6 times / day more than normal) AND [2] present > 48 hours (2 days)    Protocols used: DIARRHEA-A-AH

## 2022-06-06 RX ORDER — ALPRAZOLAM 0.25 MG/1
TABLET ORAL
Qty: 20 TABLET | Refills: 1 | Status: SHIPPED | OUTPATIENT
Start: 2022-06-06 | End: 2022-09-01

## 2022-06-09 ENCOUNTER — HOSPITAL ENCOUNTER (OUTPATIENT)
Dept: RADIOLOGY | Facility: HOSPITAL | Age: 77
Discharge: HOME | End: 2022-06-09
Attending: OBSTETRICS & GYNECOLOGY
Payer: MEDICARE

## 2022-06-09 VITALS — BODY MASS INDEX: 24.1 KG/M2 | HEIGHT: 63 IN | WEIGHT: 136 LBS

## 2022-06-09 DIAGNOSIS — Z12.31 VISIT FOR SCREENING MAMMOGRAM: ICD-10-CM

## 2022-06-09 PROCEDURE — 77063 BREAST TOMOSYNTHESIS BI: CPT | Mod: 26,,, | Performed by: RADIOLOGY

## 2022-06-09 PROCEDURE — 77067 SCR MAMMO BI INCL CAD: CPT | Mod: 26,,, | Performed by: RADIOLOGY

## 2022-06-09 PROCEDURE — 77067 MAMMO DIGITAL SCREENING BILAT WITH TOMO: ICD-10-PCS | Mod: 26,,, | Performed by: RADIOLOGY

## 2022-06-09 PROCEDURE — 77063 BREAST TOMOSYNTHESIS BI: CPT | Mod: TC,PO

## 2022-06-09 PROCEDURE — 77067 SCR MAMMO BI INCL CAD: CPT | Mod: TC,PO

## 2022-06-09 PROCEDURE — 77063 MAMMO DIGITAL SCREENING BILAT WITH TOMO: ICD-10-PCS | Mod: 26,,, | Performed by: RADIOLOGY

## 2022-08-29 ENCOUNTER — PES CALL (OUTPATIENT)
Dept: ADMINISTRATIVE | Facility: CLINIC | Age: 77
End: 2022-08-29
Payer: MEDICARE

## 2022-09-01 RX ORDER — ALPRAZOLAM 0.25 MG/1
TABLET ORAL
Qty: 20 TABLET | Refills: 1 | Status: SHIPPED | OUTPATIENT
Start: 2022-09-01 | End: 2022-11-16

## 2022-09-14 ENCOUNTER — OFFICE VISIT (OUTPATIENT)
Dept: INTERNAL MEDICINE | Facility: CLINIC | Age: 77
End: 2022-09-14
Payer: MEDICARE

## 2022-09-14 VITALS
SYSTOLIC BLOOD PRESSURE: 108 MMHG | DIASTOLIC BLOOD PRESSURE: 68 MMHG | HEIGHT: 64 IN | HEART RATE: 60 BPM | OXYGEN SATURATION: 95 % | TEMPERATURE: 98 F | BODY MASS INDEX: 23.63 KG/M2 | WEIGHT: 138.38 LBS

## 2022-09-14 DIAGNOSIS — K21.9 GASTROESOPHAGEAL REFLUX DISEASE WITHOUT ESOPHAGITIS: ICD-10-CM

## 2022-09-14 DIAGNOSIS — I25.10 CORONARY ARTERY DISEASE DUE TO CALCIFIED CORONARY LESION: ICD-10-CM

## 2022-09-14 DIAGNOSIS — M81.0 POSTMENOPAUSAL OSTEOPOROSIS: Chronic | ICD-10-CM

## 2022-09-14 DIAGNOSIS — I10 ESSENTIAL HYPERTENSION: Primary | ICD-10-CM

## 2022-09-14 DIAGNOSIS — I25.84 CORONARY ARTERY DISEASE DUE TO CALCIFIED CORONARY LESION: ICD-10-CM

## 2022-09-14 DIAGNOSIS — G47.00 INSOMNIA, UNSPECIFIED TYPE: ICD-10-CM

## 2022-09-14 DIAGNOSIS — E78.5 DYSLIPIDEMIA: ICD-10-CM

## 2022-09-14 PROCEDURE — 3078F DIAST BP <80 MM HG: CPT | Mod: CPTII,GC,S$GLB,

## 2022-09-14 PROCEDURE — 1126F PR PAIN SEVERITY QUANTIFIED, NO PAIN PRESENT: ICD-10-PCS | Mod: CPTII,GC,S$GLB,

## 2022-09-14 PROCEDURE — 99213 OFFICE O/P EST LOW 20 MIN: CPT | Mod: GC,S$GLB,,

## 2022-09-14 PROCEDURE — 3074F PR MOST RECENT SYSTOLIC BLOOD PRESSURE < 130 MM HG: ICD-10-PCS | Mod: CPTII,GC,S$GLB,

## 2022-09-14 PROCEDURE — 1101F PT FALLS ASSESS-DOCD LE1/YR: CPT | Mod: CPTII,GC,S$GLB,

## 2022-09-14 PROCEDURE — 1160F PR REVIEW ALL MEDS BY PRESCRIBER/CLIN PHARMACIST DOCUMENTED: ICD-10-PCS | Mod: CPTII,GC,S$GLB,

## 2022-09-14 PROCEDURE — 99999 PR PBB SHADOW E&M-EST. PATIENT-LVL V: ICD-10-PCS | Mod: PBBFAC,GC,,

## 2022-09-14 PROCEDURE — 99213 PR OFFICE/OUTPT VISIT, EST, LEVL III, 20-29 MIN: ICD-10-PCS | Mod: GC,S$GLB,,

## 2022-09-14 PROCEDURE — 3074F SYST BP LT 130 MM HG: CPT | Mod: CPTII,GC,S$GLB,

## 2022-09-14 PROCEDURE — 1159F PR MEDICATION LIST DOCUMENTED IN MEDICAL RECORD: ICD-10-PCS | Mod: CPTII,GC,S$GLB,

## 2022-09-14 PROCEDURE — 1126F AMNT PAIN NOTED NONE PRSNT: CPT | Mod: CPTII,GC,S$GLB,

## 2022-09-14 PROCEDURE — 3288F FALL RISK ASSESSMENT DOCD: CPT | Mod: CPTII,GC,S$GLB,

## 2022-09-14 PROCEDURE — 1160F RVW MEDS BY RX/DR IN RCRD: CPT | Mod: CPTII,GC,S$GLB,

## 2022-09-14 PROCEDURE — 3288F PR FALLS RISK ASSESSMENT DOCUMENTED: ICD-10-PCS | Mod: CPTII,GC,S$GLB,

## 2022-09-14 PROCEDURE — 1101F PR PT FALLS ASSESS DOC 0-1 FALLS W/OUT INJ PAST YR: ICD-10-PCS | Mod: CPTII,GC,S$GLB,

## 2022-09-14 PROCEDURE — 1159F MED LIST DOCD IN RCRD: CPT | Mod: CPTII,GC,S$GLB,

## 2022-09-14 PROCEDURE — 3078F PR MOST RECENT DIASTOLIC BLOOD PRESSURE < 80 MM HG: ICD-10-PCS | Mod: CPTII,GC,S$GLB,

## 2022-09-14 PROCEDURE — 99999 PR PBB SHADOW E&M-EST. PATIENT-LVL V: CPT | Mod: PBBFAC,GC,,

## 2022-09-14 RX ORDER — TRAZODONE HYDROCHLORIDE 50 MG/1
50 TABLET ORAL NIGHTLY PRN
Qty: 30 TABLET | Refills: 11 | Status: SHIPPED | OUTPATIENT
Start: 2022-09-14 | End: 2023-12-20

## 2022-09-14 NOTE — PROGRESS NOTES
Clinic Note  9/14/2022      Subjective:       Patient ID:  patient is a 76 y.o. female being seen for a 6 mo follow up.     Chief Complaint: Follow-up (6 Mos ) and Discuss Sleep Issue  (Pt says in the middle of the night when she have to use the bathroom, she has trouble going back to sleep.)    HPI  Ms. Landa is a 76 year old F w/ CAD, HTN, hypercholesteremia, LBBB and osteopenia who presents today for a 6 mo follow up. She was last seen in the office by Dr. Angeles on 3/02/2022. No acute concerns at this time. Denies HA, vision changes, SOB, CP, abdominal pain, urinary sxs, or changes w/ stool. Reports difficulty falling back asleep after waking up to use the bathroom.     CAD: ASA 81mg. Upcoming appointment w/ Dr. Warren.     HTN: Norvasc 5, Coreg 25, valsartan 320. Does not take BP at home. Denies any dizziness, HA, light-headedness or syncopal symptoms.     Dyslipidemia: Crestor 40mg. Most recent lipid panel was stable.     GERD: omeprazole 40mg.    Osteopenia: Takes Vit D3 500 IU/day. Denies recent falls. Patient did have some digestive issues w/ Fosamax in the past. DXA done 3/2/2022. Was referred to Dr. Adame for osteoporosis (diagnosed in 2019 w/ low bone mass and high FRAX). S/p Reckast infusion - patient reports severe HA ~1 week after the infusion.     Screening:  DXA: see above   Mammogram: completed 4/06 - showed no evidence of malignancy (BI-RADS 1).   C-scope: is up to date; done at [facility].       Review of Systems   Constitutional:  Negative for chills and fever.   HENT:  Negative for congestion and sinus pain.    Eyes:  Negative for blurred vision and pain.   Respiratory:  Negative for cough and shortness of breath.    Cardiovascular:  Negative for chest pain and palpitations.   Gastrointestinal:  Negative for abdominal pain, diarrhea and vomiting.   Musculoskeletal:  Negative for joint pain and myalgias.   Skin:  Negative for itching and rash.   Neurological:  Negative for dizziness and  headaches.   Psychiatric/Behavioral:  Negative for depression and suicidal ideas.      Medication List with Changes/Refills   Current Medications    ALPRAZOLAM (XANAX) 0.25 MG TABLET    TAKE 1 TABLET BY MOUTH EVERY DAY AS NEEDED FOR ANXIETY    AMLODIPINE (NORVASC) 5 MG TABLET    TAKE 1 TABLET EVERY DAY    AMOXICILLIN (AMOXIL) 875 MG TABLET        ASCORBIC ACID/COLLAGEN HYDR (COLLAGEN PLUS VITAMIN C ORAL)    Take 3 tablets by mouth once daily.    ASPIRIN (ECOTRIN) 81 MG EC TABLET    Take 81 mg by mouth once daily.    BIOTIN ORAL    Take 1 tablet by mouth once daily.    BUTALBITAL-ACETAMINOPHEN-CAFFEINE -40 MG (FIORICET, ESGIC) -40 MG PER TABLET    Take 1 tablet by mouth as needed.    CALCIUM CARBONATE-VITAMIN D3 600-125 MG-UNIT TAB    Take 1 tablet by mouth once daily.    CARVEDILOL (COREG) 25 MG TABLET    TAKE 1 TABLET TWICE DAILY    CEPHALEXIN (KEFLEX) 500 MG CAPSULE        CHOLECALCIFEROL, VITAMIN D3, 1,000 UNIT CAPSULE    Take 1 capsule by mouth Daily.      COENZYME Q10 200 MG CAPSULE    Take 200 mg by mouth once daily.    CYANOCOBALAMIN, VITAMIN B-12, 50 MCG TABLET    Take 1 tablet by mouth Daily.      CYCLOBENZAPRINE (FLEXERIL) 10 MG TABLET    TAKE 1 TABLET BY MOUTH EVERY 8 TO 12 HOURS AS NEEDED FOR MUSCLE SPASMS    ELDERBERRY FRUIT ORAL    Take 5 mLs by mouth 2 (two) times daily.    FISH OIL-OMEGA-3 FATTY ACIDS 300-1,000 MG CAPSULE    Take 1 g by mouth once daily.    FLUAD QUAD 2021-22,65Y UP,,PF, 60 MCG (15 MCG X 4)/0.5 ML SYRG        FLUCONAZOLE (DIFLUCAN) 150 MG TAB    Take one tablet today and repeat in three days if still symptomatic    FLUTICASONE (FLONASE) 50 MCG/ACTUATION NASAL SPRAY    1 spray as needed.     FOLIC ACID (FOLVITE) 400 MCG TABLET    Take 400 mcg by mouth once daily.    LACTOBACILLUS ACIDOPHILUS (ACIDOPHILUS ORAL)    Take 1 capsule by mouth once daily.    MONTELUKAST (SINGULAIR) 10 MG TABLET    Take 10 mg by mouth every evening.    MULTIVIT WITH MINERALS/LUTEIN (MULTIVITAMIN  "50 PLUS ORAL)    Take 1 tablet by mouth once daily.    MUPIROCIN (BACTROBAN) 2 % OINTMENT        NITROFURANTOIN, MACROCRYSTAL-MONOHYDRATE, (MACROBID) 100 MG CAPSULE    Take 1 capsule (100 mg total) by mouth 2 (two) times daily.    NYSTATIN-TRIAMCINOLONE (MYCOLOG II) CREAM    Apply to affected area 2 times daily    OMEPRAZOLE (PRILOSEC) 40 MG CAPSULE    TK 1 C PO QD    ONDANSETRON (ZOFRAN-ODT) 4 MG TBDL    Take 4 mg by mouth as needed.     POLYETHYLENE GLYCOL (GLYCOLAX) 17 GRAM/DOSE POWDER    Take by mouth as needed. Pt taking 1 capful prn.    PREDNISONE (DELTASONE) 5 MG TABLET        PROMETHAZINE (PHENERGAN) 25 MG TABLET    TAKE 1 TABLET(25 MG) BY MOUTH EVERY 6 HOURS AS NEEDED FOR NAUSEA    ROSUVASTATIN (CRESTOR) 40 MG TAB    TAKE 1 TABLET(40 MG) BY MOUTH EVERY DAY    SIMETHICONE (GAS-X ORAL)    Take 1 tablet by mouth as needed.    TRAMADOL (ULTRAM) 50 MG TABLET    Take 1 tablet (50 mg total) by mouth every 6 (six) hours as needed for Pain.    TURMERIC ROOT EXTRACT ORAL    Take 2 capsules by mouth once daily.    VALSARTAN (DIOVAN) 320 MG TABLET    TAKE 1 TABLET EVERY DAY    VITAMIN E 400 UNIT CAPSULE    Take 400 mg by mouth once daily.       Patient Active Problem List   Diagnosis    Essential hypertension    Hypercholesteremia    LBBB (left bundle branch block)    Coronary artery disease due to calcified coronary lesion    Vitamin D deficiency    Bradycardia - asymptomatic    Postmenopausal osteoporosis    Dyslipidemia    GERD (gastroesophageal reflux disease)             Health Maintenance Topics with due status: Not Due       Topic Last Completion Date    TETANUS VACCINE 05/28/2020    Lipid Panel 02/23/2022    DEXA Scan 03/18/2022       Objective:      There were no vitals taken for this visit.  Estimated body mass index is 24.09 kg/m² as calculated from the following:    Height as of 6/9/22: 5' 3" (1.6 m).    Weight as of 6/9/22: 61.7 kg (136 lb).  Physical Exam  Constitutional:       Appearance: Normal " appearance.   HENT:      Head: Normocephalic and atraumatic.      Right Ear: External ear normal.      Left Ear: External ear normal.      Mouth/Throat:      Mouth: Mucous membranes are dry.      Pharynx: Oropharynx is clear.   Eyes:      Extraocular Movements: Extraocular movements intact.      Conjunctiva/sclera: Conjunctivae normal.   Cardiovascular:      Rate and Rhythm: Normal rate and regular rhythm.   Pulmonary:      Effort: Pulmonary effort is normal.      Breath sounds: Normal breath sounds.   Musculoskeletal:      Cervical back: Normal range of motion.   Neurological:      Mental Status: She is alert.         Assessment and Plan:     Essential hypertension  - /68 in office today, manual repeat 110/70  - patient does not report any symptoms associated w/ hypotension but I advised her to log her BP to determine if anti-hypertensive regimen needs to be adjusted.   - continue Norvasc 5mg, Coreg 25mg and valsartan 320mg    Dyslipidemia  - continue Crestor     Postmenopausal osteoporosis  - continue Reclast infusions as tolerated     Gastroesophageal reflux disease without esophagitis  - continue PPI    Insomnia, unspecified type  - traZODone (DESYREL) 50 MG tablet; Take 1 tablet (50 mg total) by mouth nightly as needed for Insomnia. Do not take with Xanax or other sedating medications  Dispense: 30 tablet; Refill: 11  - explained that trazodone is not to be taken w/ other benzodiazepines or other sedating medications     Coronary artery disease   - continue ASA  - follow up w/ Dr. Warren       Follow Up:   - RTC in 6 months     Alba Manzanares M.D.  Internal Medicine PGY-2  [facility]

## 2022-09-21 ENCOUNTER — TELEPHONE (OUTPATIENT)
Dept: CARDIOLOGY | Facility: CLINIC | Age: 77
End: 2022-09-21
Payer: MEDICARE

## 2022-09-21 ENCOUNTER — TELEPHONE (OUTPATIENT)
Dept: INTERNAL MEDICINE | Facility: CLINIC | Age: 77
End: 2022-09-21
Payer: MEDICARE

## 2022-09-21 NOTE — TELEPHONE ENCOUNTER
Spoke with the pt, letting her know the provider recommended, spoke with the provider, he stated he would cll the pt and discuss things over with her.

## 2022-09-21 NOTE — TELEPHONE ENCOUNTER
"Spoke with pt who advises that at 5 am she woke up she noted numbness to her Right Arm and R Leg.    She states that it was not a numbness similar to sleeping on a particular side.    Pt advises that she had to use her L arm to move her R arm and though she was able to get up out of the bed, she had to "drag her R leg".    Compares the numbness to the heaviness of lead.    Denies headache or blurred vision.    Pt advises that this has never happened before.    Advises that 30 minutes or so later she was back to normal and the numbness was gone.    Please advise.    Thank you.  "

## 2022-09-21 NOTE — TELEPHONE ENCOUNTER
----- Message from Nicolasa Lobo sent at 9/21/2022  9:45 AM CDT -----  Contact: [phone]  Pt states the right side of her arm and leg went numb and she states she is now fine so she does not know what is going on please give return call

## 2022-09-21 NOTE — TELEPHONE ENCOUNTER
Pt calling to report at 5am today she woke up to go to the bathroom and experienced right arm and leg numbness. Pt states she dragged her leg and went to the bathroom then wentback to bed to lie down. Pt states when she woke later she felt fine. Advised pt contact her PCP to report her symptoms. Pt transferred to 's office.

## 2022-09-22 ENCOUNTER — OFFICE VISIT (OUTPATIENT)
Dept: INTERNAL MEDICINE | Facility: CLINIC | Age: 77
End: 2022-09-22
Payer: MEDICARE

## 2022-09-22 ENCOUNTER — LAB VISIT (OUTPATIENT)
Dept: LAB | Facility: HOSPITAL | Age: 77
End: 2022-09-22
Attending: INTERNAL MEDICINE
Payer: MEDICARE

## 2022-09-22 VITALS
WEIGHT: 137.81 LBS | DIASTOLIC BLOOD PRESSURE: 82 MMHG | TEMPERATURE: 97 F | OXYGEN SATURATION: 98 % | SYSTOLIC BLOOD PRESSURE: 122 MMHG | BODY MASS INDEX: 23.53 KG/M2 | RESPIRATION RATE: 16 BRPM | HEIGHT: 64 IN | HEART RATE: 73 BPM

## 2022-09-22 DIAGNOSIS — G45.9 TRANSIENT CEREBRAL ISCHEMIA, UNSPECIFIED TYPE: ICD-10-CM

## 2022-09-22 DIAGNOSIS — I10 ESSENTIAL HYPERTENSION: ICD-10-CM

## 2022-09-22 DIAGNOSIS — G45.9 TIA (TRANSIENT ISCHEMIC ATTACK): ICD-10-CM

## 2022-09-22 DIAGNOSIS — G45.9 TIA (TRANSIENT ISCHEMIC ATTACK): Primary | ICD-10-CM

## 2022-09-22 LAB
ANION GAP SERPL CALC-SCNC: 7 MMOL/L (ref 8–16)
BASOPHILS # BLD AUTO: 0.04 K/UL (ref 0–0.2)
BASOPHILS NFR BLD: 0.7 % (ref 0–1.9)
BUN SERPL-MCNC: 13 MG/DL (ref 8–23)
CALCIUM SERPL-MCNC: 9.2 MG/DL (ref 8.7–10.5)
CHLORIDE SERPL-SCNC: 104 MMOL/L (ref 95–110)
CO2 SERPL-SCNC: 28 MMOL/L (ref 23–29)
CREAT SERPL-MCNC: 1.1 MG/DL (ref 0.5–1.4)
DIFFERENTIAL METHOD: ABNORMAL
EOSINOPHIL # BLD AUTO: 0.2 K/UL (ref 0–0.5)
EOSINOPHIL NFR BLD: 3.4 % (ref 0–8)
ERYTHROCYTE [DISTWIDTH] IN BLOOD BY AUTOMATED COUNT: 12.6 % (ref 11.5–14.5)
EST. GFR  (NO RACE VARIABLE): 51.4 ML/MIN/1.73 M^2
GLUCOSE SERPL-MCNC: 112 MG/DL (ref 70–110)
HCT VFR BLD AUTO: 38 % (ref 37–48.5)
HGB BLD-MCNC: 12.1 G/DL (ref 12–16)
IMM GRANULOCYTES # BLD AUTO: 0.01 K/UL (ref 0–0.04)
IMM GRANULOCYTES NFR BLD AUTO: 0.2 % (ref 0–0.5)
LYMPHOCYTES # BLD AUTO: 2.1 K/UL (ref 1–4.8)
LYMPHOCYTES NFR BLD: 35.7 % (ref 18–48)
MCH RBC QN AUTO: 31.5 PG (ref 27–31)
MCHC RBC AUTO-ENTMCNC: 31.8 G/DL (ref 32–36)
MCV RBC AUTO: 99 FL (ref 82–98)
MONOCYTES # BLD AUTO: 0.5 K/UL (ref 0.3–1)
MONOCYTES NFR BLD: 8.6 % (ref 4–15)
NEUTROPHILS # BLD AUTO: 3 K/UL (ref 1.8–7.7)
NEUTROPHILS NFR BLD: 51.4 % (ref 38–73)
NRBC BLD-RTO: 0 /100 WBC
PLATELET # BLD AUTO: 204 K/UL (ref 150–450)
PMV BLD AUTO: 11.7 FL (ref 9.2–12.9)
POTASSIUM SERPL-SCNC: 4.1 MMOL/L (ref 3.5–5.1)
RBC # BLD AUTO: 3.84 M/UL (ref 4–5.4)
SODIUM SERPL-SCNC: 139 MMOL/L (ref 136–145)
WBC # BLD AUTO: 5.8 K/UL (ref 3.9–12.7)

## 2022-09-22 PROCEDURE — 3079F DIAST BP 80-89 MM HG: CPT | Mod: CPTII,S$GLB,, | Performed by: INTERNAL MEDICINE

## 2022-09-22 PROCEDURE — 1101F PR PT FALLS ASSESS DOC 0-1 FALLS W/OUT INJ PAST YR: ICD-10-PCS | Mod: CPTII,S$GLB,, | Performed by: INTERNAL MEDICINE

## 2022-09-22 PROCEDURE — 99214 OFFICE O/P EST MOD 30 MIN: CPT | Mod: S$GLB,,, | Performed by: INTERNAL MEDICINE

## 2022-09-22 PROCEDURE — 1159F PR MEDICATION LIST DOCUMENTED IN MEDICAL RECORD: ICD-10-PCS | Mod: CPTII,S$GLB,, | Performed by: INTERNAL MEDICINE

## 2022-09-22 PROCEDURE — 3074F SYST BP LT 130 MM HG: CPT | Mod: CPTII,S$GLB,, | Performed by: INTERNAL MEDICINE

## 2022-09-22 PROCEDURE — 3288F PR FALLS RISK ASSESSMENT DOCUMENTED: ICD-10-PCS | Mod: CPTII,S$GLB,, | Performed by: INTERNAL MEDICINE

## 2022-09-22 PROCEDURE — 36415 COLL VENOUS BLD VENIPUNCTURE: CPT | Mod: PO | Performed by: INTERNAL MEDICINE

## 2022-09-22 PROCEDURE — 85025 COMPLETE CBC W/AUTO DIFF WBC: CPT | Performed by: INTERNAL MEDICINE

## 2022-09-22 PROCEDURE — 3288F FALL RISK ASSESSMENT DOCD: CPT | Mod: CPTII,S$GLB,, | Performed by: INTERNAL MEDICINE

## 2022-09-22 PROCEDURE — 1101F PT FALLS ASSESS-DOCD LE1/YR: CPT | Mod: CPTII,S$GLB,, | Performed by: INTERNAL MEDICINE

## 2022-09-22 PROCEDURE — 99999 PR PBB SHADOW E&M-EST. PATIENT-LVL V: ICD-10-PCS | Mod: PBBFAC,,, | Performed by: INTERNAL MEDICINE

## 2022-09-22 PROCEDURE — 3079F PR MOST RECENT DIASTOLIC BLOOD PRESSURE 80-89 MM HG: ICD-10-PCS | Mod: CPTII,S$GLB,, | Performed by: INTERNAL MEDICINE

## 2022-09-22 PROCEDURE — 99214 PR OFFICE/OUTPT VISIT, EST, LEVL IV, 30-39 MIN: ICD-10-PCS | Mod: S$GLB,,, | Performed by: INTERNAL MEDICINE

## 2022-09-22 PROCEDURE — 3074F PR MOST RECENT SYSTOLIC BLOOD PRESSURE < 130 MM HG: ICD-10-PCS | Mod: CPTII,S$GLB,, | Performed by: INTERNAL MEDICINE

## 2022-09-22 PROCEDURE — 1159F MED LIST DOCD IN RCRD: CPT | Mod: CPTII,S$GLB,, | Performed by: INTERNAL MEDICINE

## 2022-09-22 PROCEDURE — 99999 PR PBB SHADOW E&M-EST. PATIENT-LVL V: CPT | Mod: PBBFAC,,, | Performed by: INTERNAL MEDICINE

## 2022-09-22 PROCEDURE — 80048 BASIC METABOLIC PNL TOTAL CA: CPT | Performed by: INTERNAL MEDICINE

## 2022-09-22 RX ORDER — CLOPIDOGREL BISULFATE 75 MG/1
75 TABLET ORAL DAILY
Qty: 30 TABLET | Refills: 0 | Status: SHIPPED | OUTPATIENT
Start: 2022-09-22 | End: 2022-10-19

## 2022-09-22 NOTE — PROGRESS NOTES
History of present illness:   [age] year lady with a history of hypertension, dyslipidemia is in today with transient neurological symptomatologies.  The patient states that yesterday morning she awoke and was unable to move her right upper and lower extremities.  They felt numb.  She lives alone, not certain if there was any speech deficit.  She denied any headache visual disturbance or other.  No bowel urinary incontinence.  She reports symptoms lasted about 30 minutes and resolved.  No prior history of same.  No recurrent symptoms since that episode.  She is on low-dose aspirin therapy chronically.    Current medications:  All medications are noted and reviewed.      Review of systems:  Constitutional:  No fever no chills no generalized body aches.    HEENT:  No hoarseness no dysphagia.  Respiratory:  No cough shortness of breath.    Cardiovascular:  Denies chest pain palpitations, syncope, presyncope.    GI:  No nausea vomiting abdominal pain or bowel incontinence.    :  Or change in the color character urine.  No urinary continence.    Physical examination:  General:  Pleasant alert appropriately groomed lady no acute distress.    Vital signs:  All noted and reviewed is normal.  HEENT: Normocephalic.  Neck supple no masses no thyromegaly.  No facial asymmetry.    Lungs: Clear to auscultation.    Cardiovascular:  Regular rate and rhythm.  Rate low 70s and regular.  No significant murmur.  Carotids are full bilaterally without bruits.  No JVD.  No peripheral extremity edema.  Neurologic:  Cranial nerves 2-12 are intact.  There are no focal motor or obvious sensory deficits.  Gait is normal.  Negative Romberg.      Impression:  TIA with transient right upper and lower extremity paralysis.    ABCD squared score is four.        Plan:   Continue aspirin.    Begin clopidogrel 75 mg daily.    The patient declines any consideration for MRI but is agreeable to CT imaging. CTA neck and brain ordered  urgently.  Stat BMP and CBC today.    2D echo with color-flow Doppler.    Reviewed that any recurrent neurological symptomatologies she should proceed immediately to the ED.

## 2022-09-23 ENCOUNTER — HOSPITAL ENCOUNTER (OUTPATIENT)
Dept: RADIOLOGY | Facility: HOSPITAL | Age: 77
Discharge: HOME | End: 2022-09-23
Attending: INTERNAL MEDICINE
Payer: MEDICARE

## 2022-09-23 ENCOUNTER — HOSPITAL ENCOUNTER (OUTPATIENT)
Dept: CARDIOLOGY | Facility: HOSPITAL | Age: 77
Discharge: HOME | End: 2022-09-23
Attending: INTERNAL MEDICINE
Payer: MEDICARE

## 2022-09-23 ENCOUNTER — TELEPHONE (OUTPATIENT)
Dept: INTERNAL MEDICINE | Facility: CLINIC | Age: 77
End: 2022-09-23
Payer: MEDICARE

## 2022-09-23 VITALS
WEIGHT: 137 LBS | HEART RATE: 55 BPM | SYSTOLIC BLOOD PRESSURE: 120 MMHG | DIASTOLIC BLOOD PRESSURE: 70 MMHG | HEIGHT: 64 IN | BODY MASS INDEX: 23.39 KG/M2

## 2022-09-23 DIAGNOSIS — G45.9 TIA (TRANSIENT ISCHEMIC ATTACK): ICD-10-CM

## 2022-09-23 DIAGNOSIS — G45.9 TRANSIENT CEREBRAL ISCHEMIA, UNSPECIFIED TYPE: ICD-10-CM

## 2022-09-23 LAB
AV INDEX (PROSTH): 0.84
AV MEAN GRADIENT: 2 MMHG
AV PEAK GRADIENT: 3 MMHG
AV VALVE AREA: 2.78 CM2
AV VELOCITY RATIO: 0.83
BSA FOR ECHO PROCEDURE: 1.68 M2
CV ECHO LV RWT: 0.33 CM
DOP CALC AO PEAK VEL: 0.9 M/S
DOP CALC AO VTI: 17.54 CM
DOP CALC LVOT AREA: 3.3 CM2
DOP CALC LVOT DIAMETER: 2.06 CM
DOP CALC LVOT PEAK VEL: 0.75 M/S
DOP CALC LVOT STROKE VOLUME: 48.84 CM3
DOP CALCLVOT PEAK VEL VTI: 14.66 CM
E WAVE DECELERATION TIME: 260.71 MSEC
E/A RATIO: 0.49
E/E' RATIO: 11.11 M/S
ECHO LV POSTERIOR WALL: 0.87 CM (ref 0.6–1.1)
EJECTION FRACTION: 55 %
FRACTIONAL SHORTENING: 20 % (ref 28–44)
INTERVENTRICULAR SEPTUM: 0.9 CM (ref 0.6–1.1)
LA MAJOR: 4.02 CM
LA MINOR: 4.81 CM
LA WIDTH: 3.64 CM
LEFT ATRIUM SIZE: 3.09 CM
LEFT ATRIUM VOLUME INDEX MOD: 25.7 ML/M2
LEFT ATRIUM VOLUME INDEX: 25.1 ML/M2
LEFT ATRIUM VOLUME MOD: 43 CM3
LEFT ATRIUM VOLUME: 41.87 CM3
LEFT INTERNAL DIMENSION IN SYSTOLE: 4.22 CM (ref 2.1–4)
LEFT VENTRICLE DIASTOLIC VOLUME INDEX: 79.82 ML/M2
LEFT VENTRICLE DIASTOLIC VOLUME: 133.3 ML
LEFT VENTRICLE MASS INDEX: 101 G/M2
LEFT VENTRICLE SYSTOLIC VOLUME INDEX: 47.7 ML/M2
LEFT VENTRICLE SYSTOLIC VOLUME: 79.64 ML
LEFT VENTRICULAR INTERNAL DIMENSION IN DIASTOLE: 5.27 CM (ref 3.5–6)
LEFT VENTRICULAR MASS: 169.12 G
LV LATERAL E/E' RATIO: 10 M/S
LV SEPTAL E/E' RATIO: 12.5 M/S
MV A" WAVE DURATION": 16.37 MSEC
MV PEAK A VEL: 1.02 M/S
MV PEAK E VEL: 0.5 M/S
MV STENOSIS PRESSURE HALF TIME: 75.61 MS
MV VALVE AREA P 1/2 METHOD: 2.91 CM2
PULM VEIN S/D RATIO: 1.54
PV PEAK D VEL: 0.28 M/S
PV PEAK S VEL: 0.43 M/S
RA MAJOR: 4.18 CM
RA PRESSURE: 3 MMHG
RA WIDTH: 2.98 CM
RIGHT VENTRICULAR END-DIASTOLIC DIMENSION: 2.73 CM
RV TISSUE DOPPLER FREE WALL SYSTOLIC VELOCITY 1 (APICAL 4 CHAMBER VIEW): 7.59 CM/S
SINUS: 4.2 CM
STJ: 3.7 CM
TDI LATERAL: 0.05 M/S
TDI SEPTAL: 0.04 M/S
TDI: 0.05 M/S
TRICUSPID ANNULAR PLANE SYSTOLIC EXCURSION: 2.19 CM

## 2022-09-23 PROCEDURE — 25500020 PHARM REV CODE 255: Performed by: INTERNAL MEDICINE

## 2022-09-23 PROCEDURE — 70496 CT ANGIOGRAPHY HEAD: CPT | Mod: TC

## 2022-09-23 PROCEDURE — 70498 CT ANGIOGRAPHY NECK: CPT | Mod: 26,,, | Performed by: RADIOLOGY

## 2022-09-23 PROCEDURE — 93306 ECHO (CUPID ONLY): ICD-10-PCS | Mod: 26,,, | Performed by: INTERNAL MEDICINE

## 2022-09-23 PROCEDURE — 93306 TTE W/DOPPLER COMPLETE: CPT

## 2022-09-23 PROCEDURE — 93306 TTE W/DOPPLER COMPLETE: CPT | Mod: 26,,, | Performed by: INTERNAL MEDICINE

## 2022-09-23 PROCEDURE — 70498 CTA HEAD AND NECK (XPD): ICD-10-PCS | Mod: 26,,, | Performed by: RADIOLOGY

## 2022-09-23 PROCEDURE — 70496 CT ANGIOGRAPHY HEAD: CPT | Mod: 26,,, | Performed by: RADIOLOGY

## 2022-09-23 PROCEDURE — 70496 CTA HEAD AND NECK (XPD): ICD-10-PCS | Mod: 26,,, | Performed by: RADIOLOGY

## 2022-09-23 RX ADMIN — IOHEXOL 100 ML: 350 INJECTION, SOLUTION INTRAVENOUS at 08:09

## 2022-09-23 NOTE — TELEPHONE ENCOUNTER
----- Message from Ana Peñaloza sent at 9/23/2022 12:47 PM CDT -----  Contact: pt  Patient is returning a phone call.  Who left a message for the patient: Jose Mcdermott LPN  Does patient know what this is regarding: n/a  Would you like a call back, or a response through your [facility] portal?:   call     Please call and advise.    Thank You

## 2022-09-23 NOTE — TELEPHONE ENCOUNTER
Please advise pt that the CT study of the head and neck did not show any blood vessel blockages or any other concerns---good !  Await Echocardiogram.  Continue current medication for now

## 2022-09-23 NOTE — TELEPHONE ENCOUNTER
"Spoke with pt to advise of CT results.  Verbalized understanding.    She will continue with the blood thinner prescribed yesterday and d/c the "baby ASA".    Please advise Echo results.    Thank you.  "

## 2022-09-23 NOTE — TELEPHONE ENCOUNTER
----- Message from Yesica Maradiaga sent at 9/23/2022  1:27 PM CDT -----  Contact: [phone]  Patient is returning a phone call.  Who left a message for the patient: n/a  Does patient know what this is regarding:  results  Would you like a call back, or a response through your [facility] portal?:   call  Comments:

## 2022-09-27 ENCOUNTER — TELEPHONE (OUTPATIENT)
Dept: INTERNAL MEDICINE | Facility: CLINIC | Age: 77
End: 2022-09-27
Payer: MEDICARE

## 2022-09-27 NOTE — TELEPHONE ENCOUNTER
Pt states that she has noticed bruising throughout body both arms , left leg and foot and states that she noticed after taking Plavix and believes this is the cause  . Pt is wondering if she should continue this medication says she never had bruising like this before . Please advise  Thank you

## 2022-11-26 ENCOUNTER — OFFICE VISIT (OUTPATIENT)
Dept: URGENT CARE | Facility: CLINIC | Age: 77
End: 2022-11-26
Payer: MEDICARE

## 2022-11-26 VITALS
BODY MASS INDEX: 23.39 KG/M2 | HEART RATE: 55 BPM | SYSTOLIC BLOOD PRESSURE: 132 MMHG | RESPIRATION RATE: 14 BRPM | OXYGEN SATURATION: 96 % | WEIGHT: 137 LBS | DIASTOLIC BLOOD PRESSURE: 79 MMHG | TEMPERATURE: 98 F | HEIGHT: 64 IN

## 2022-11-26 DIAGNOSIS — J06.9 UPPER RESPIRATORY TRACT INFECTION, UNSPECIFIED TYPE: ICD-10-CM

## 2022-11-26 DIAGNOSIS — R30.0 DYSURIA: ICD-10-CM

## 2022-11-26 DIAGNOSIS — J34.89 RHINORRHEA: ICD-10-CM

## 2022-11-26 DIAGNOSIS — N30.00 ACUTE CYSTITIS WITHOUT HEMATURIA: Primary | ICD-10-CM

## 2022-11-26 LAB
BILIRUB UR QL STRIP: NEGATIVE
CTP QC/QA: YES
CTP QC/QA: YES
GLUCOSE UR QL STRIP: NEGATIVE
KETONES UR QL STRIP: NEGATIVE
LEUKOCYTE ESTERASE UR QL STRIP: POSITIVE
PH, POC UA: 5.5 (ref 5–8)
POC BLOOD, URINE: NEGATIVE
POC MOLECULAR INFLUENZA A AGN: NEGATIVE
POC MOLECULAR INFLUENZA B AGN: NEGATIVE
POC NITRATES, URINE: NEGATIVE
PROT UR QL STRIP: POSITIVE
SARS-COV-2 AG RESP QL IA.RAPID: NEGATIVE
SP GR UR STRIP: 1.02 (ref 1–1.03)
UROBILINOGEN UR STRIP-ACNC: NORMAL (ref 0.1–1.1)

## 2022-11-26 PROCEDURE — 81003 POCT URINALYSIS, DIPSTICK, AUTOMATED, W/O SCOPE: ICD-10-PCS | Mod: QW,S$GLB,, | Performed by: NURSE PRACTITIONER

## 2022-11-26 PROCEDURE — 1159F PR MEDICATION LIST DOCUMENTED IN MEDICAL RECORD: ICD-10-PCS | Mod: CPTII,S$GLB,, | Performed by: NURSE PRACTITIONER

## 2022-11-26 PROCEDURE — 1125F AMNT PAIN NOTED PAIN PRSNT: CPT | Mod: CPTII,S$GLB,, | Performed by: NURSE PRACTITIONER

## 2022-11-26 PROCEDURE — U0002 COVID-19 LAB TEST NON-CDC: HCPCS | Mod: QW,S$GLB,, | Performed by: NURSE PRACTITIONER

## 2022-11-26 PROCEDURE — 1160F RVW MEDS BY RX/DR IN RCRD: CPT | Mod: CPTII,S$GLB,, | Performed by: NURSE PRACTITIONER

## 2022-11-26 PROCEDURE — 1159F MED LIST DOCD IN RCRD: CPT | Mod: CPTII,S$GLB,, | Performed by: NURSE PRACTITIONER

## 2022-11-26 PROCEDURE — 87502 INFLUENZA DNA AMP PROBE: CPT | Mod: QW,S$GLB,, | Performed by: NURSE PRACTITIONER

## 2022-11-26 PROCEDURE — 3078F DIAST BP <80 MM HG: CPT | Mod: CPTII,S$GLB,, | Performed by: NURSE PRACTITIONER

## 2022-11-26 PROCEDURE — 99214 PR OFFICE/OUTPT VISIT, EST, LEVL IV, 30-39 MIN: ICD-10-PCS | Mod: S$GLB,,, | Performed by: NURSE PRACTITIONER

## 2022-11-26 PROCEDURE — 1125F PR PAIN SEVERITY QUANTIFIED, PAIN PRESENT: ICD-10-PCS | Mod: CPTII,S$GLB,, | Performed by: NURSE PRACTITIONER

## 2022-11-26 PROCEDURE — 99214 OFFICE O/P EST MOD 30 MIN: CPT | Mod: S$GLB,,, | Performed by: NURSE PRACTITIONER

## 2022-11-26 PROCEDURE — 3075F PR MOST RECENT SYSTOLIC BLOOD PRESS GE 130-139MM HG: ICD-10-PCS | Mod: CPTII,S$GLB,, | Performed by: NURSE PRACTITIONER

## 2022-11-26 PROCEDURE — 81003 URINALYSIS AUTO W/O SCOPE: CPT | Mod: QW,S$GLB,, | Performed by: NURSE PRACTITIONER

## 2022-11-26 PROCEDURE — 1160F PR REVIEW ALL MEDS BY PRESCRIBER/CLIN PHARMACIST DOCUMENTED: ICD-10-PCS | Mod: CPTII,S$GLB,, | Performed by: NURSE PRACTITIONER

## 2022-11-26 PROCEDURE — 3078F PR MOST RECENT DIASTOLIC BLOOD PRESSURE < 80 MM HG: ICD-10-PCS | Mod: CPTII,S$GLB,, | Performed by: NURSE PRACTITIONER

## 2022-11-26 PROCEDURE — 3075F SYST BP GE 130 - 139MM HG: CPT | Mod: CPTII,S$GLB,, | Performed by: NURSE PRACTITIONER

## 2022-11-26 PROCEDURE — U0002 SARS CORONAVIRUS 2 ANTIGEN POCT, MANUAL READ: ICD-10-PCS | Mod: QW,S$GLB,, | Performed by: NURSE PRACTITIONER

## 2022-11-26 PROCEDURE — 87502 POCT INFLUENZA A/B MOLECULAR: ICD-10-PCS | Mod: QW,S$GLB,, | Performed by: NURSE PRACTITIONER

## 2022-11-26 RX ORDER — IPRATROPIUM BROMIDE 21 UG/1
1 SPRAY, METERED NASAL 2 TIMES DAILY
Qty: 30 ML | Refills: 0 | Status: SHIPPED | OUTPATIENT
Start: 2022-11-26 | End: 2022-12-03

## 2022-11-26 RX ORDER — CEPHALEXIN 500 MG/1
500 CAPSULE ORAL EVERY 12 HOURS
Qty: 14 CAPSULE | Refills: 0 | Status: SHIPPED | OUTPATIENT
Start: 2022-11-26 | End: 2022-12-03

## 2022-11-26 RX ORDER — MECLIZINE HCL 12.5 MG 12.5 MG/1
TABLET ORAL
COMMUNITY
Start: 2022-06-24

## 2022-11-26 NOTE — PROGRESS NOTES
"Subjective:       Patient ID: Nesha Landa is a 77 y.o. female.    Vitals:  height is 5' 4" (1.626 m) and weight is 62.1 kg (137 lb). Her oral temperature is 98.2 °F (36.8 °C). Her blood pressure is 132/79 and her pulse is 55 (abnormal). Her respiration is 14 and oxygen saturation is 96%.     Chief Complaint: Sinus Problem and Dysuria    Pt comes to the clinic with 3 days of cough, congestion , HA and dysuria. No known sick contacts. Immunized against Covid-19.    Sinus Problem  This is a new problem. The current episode started in the past 7 days (11/23/22). The problem has been gradually worsening since onset. There has been no fever. Her pain is at a severity of 8/10. The pain is severe. Associated symptoms include congestion (rhinorrhea), coughing (non-productive) and headaches. Pertinent negatives include no chills, diaphoresis, ear pain, hoarse voice, neck pain, shortness of breath, sinus pressure, sneezing, sore throat or swollen glands. (Abdominal pain, post-nasal drip, increased flatulence) Treatments tried: tylenol, elderberry, mucinex. The treatment provided mild relief.   Dysuria   This is a new problem. The current episode started today. The problem has been unchanged. Quality: pressure. The pain is at a severity of 8/10. The pain is severe. There has been no fever. Pertinent negatives include no behavior changes, chills, discharge, flank pain, frequency, hematuria, hesitancy, nausea, possible pregnancy, sweats, urgency, vomiting, weight loss, bubble bath use, constipation, rash or withholding. Associated symptoms comments: Abdominal pain (cramping), increased flatulence . She has tried nothing for the symptoms. Her past medical history is significant for genitourinary reflux and hypertension. There is no history of catheterization, diabetes insipidus, diabetes mellitus, kidney stones, recurrent UTIs, a single kidney, STD, urinary stasis or a urological procedure.     Constitution: Negative for chills " and sweating.   HENT:  Positive for congestion (rhinorrhea). Negative for ear pain, sinus pressure and sore throat.    Neck: Negative for neck pain.   Respiratory:  Positive for cough (non-productive). Negative for shortness of breath.    Gastrointestinal:  Negative for nausea, vomiting and constipation.   Genitourinary:  Positive for dysuria. Negative for frequency, urgency, flank pain and hematuria.   Skin:  Negative for rash.   Allergic/Immunologic: Negative for sneezing.   Neurological:  Positive for headaches.     Objective:      Physical Exam   Constitutional: She is oriented to person, place, and time. She appears well-developed. She is cooperative.  Non-toxic appearance. She does not appear ill. No distress.   HENT:   Head: Normocephalic and atraumatic.   Ears:   Right Ear: Hearing, tympanic membrane, external ear and ear canal normal.   Left Ear: Hearing, tympanic membrane, external ear and ear canal normal.   Nose: Nose normal. No mucosal edema, rhinorrhea or nasal deformity. No epistaxis. Right sinus exhibits no maxillary sinus tenderness and no frontal sinus tenderness. Left sinus exhibits no maxillary sinus tenderness and no frontal sinus tenderness.   Mouth/Throat: Uvula is midline, oropharynx is clear and moist and mucous membranes are normal. No trismus in the jaw. Normal dentition. No uvula swelling. Cobblestoning present. No oropharyngeal exudate, posterior oropharyngeal edema or posterior oropharyngeal erythema.   Eyes: Conjunctivae and lids are normal. No scleral icterus.   Neck: Trachea normal and phonation normal. Neck supple. No edema present. No erythema present. No neck rigidity present.   Cardiovascular: Normal rate, regular rhythm, normal heart sounds and normal pulses.   Pulmonary/Chest: Effort normal and breath sounds normal. No respiratory distress. She has no decreased breath sounds. She has no rhonchi.   Abdominal: Normal appearance. There is no left CVA tenderness and no right CVA  tenderness.   Musculoskeletal: Normal range of motion.         General: No deformity. Normal range of motion.   Neurological: She is alert and oriented to person, place, and time. She exhibits normal muscle tone. Coordination normal.   Skin: Skin is warm, dry, intact, not diaphoretic and not pale.   Psychiatric: Her speech is normal and behavior is normal. Judgment and thought content normal.   Nursing note and vitals reviewed.      Results for orders placed or performed in visit on 11/26/22   POCT Urinalysis, Dipstick, Automated, W/O Scope   Result Value Ref Range    POC Blood, Urine Negative Negative    POC Bilirubin, Urine Negative Negative    POC Urobilinogen, Urine Normal 0.1 - 1.1    POC Ketones, Urine Negative Negative    POC Protein, Urine Positive (A) Negative    POC Nitrates, Urine Negative Negative    POC Glucose, Urine Negative Negative    pH, UA 5.5 5 - 8    POC Specific Gravity, Urine 1.025 1.003 - 1.029    POC Leukocytes, Urine Positive (A) Negative   SARS Coronavirus 2 Antigen, POCT Manual Read   Result Value Ref Range    SARS Coronavirus 2 Antigen Negative Negative     Acceptable Yes    POCT Influenza A/B MOLECULAR   Result Value Ref Range    POC Molecular Influenza A Ag Negative Negative, Not Reported    POC Molecular Influenza B Ag Negative Negative, Not Reported     Acceptable Yes        Assessment:       1. Acute cystitis without hematuria    2. Upper respiratory tract infection, unspecified type    3. Rhinorrhea    4. Dysuria          Plan:       Labs ordered at this visit reviewed.     Acute cystitis without hematuria  -     cephALEXin (KEFLEX) 500 MG capsule; Take 1 capsule (500 mg total) by mouth every 12 (twelve) hours. for 7 days  Dispense: 14 capsule; Refill: 0    Upper respiratory tract infection, unspecified type  -     ipratropium (ATROVENT) 21 mcg (0.03 %) nasal spray; 1 spray by Each Nostril route 2 (two) times daily. for 7 days  Dispense: 30 mL; Refill:  0    Rhinorrhea  -     SARS Coronavirus 2 Antigen, POCT Manual Read  -     POCT Influenza A/B MOLECULAR  -     ipratropium (ATROVENT) 21 mcg (0.03 %) nasal spray; 1 spray by Each Nostril route 2 (two) times daily. for 7 days  Dispense: 30 mL; Refill: 0    Dysuria  -     POCT Urinalysis, Dipstick, Automated, W/O Scope

## 2022-12-01 ENCOUNTER — HOSPITAL ENCOUNTER (OUTPATIENT)
Dept: CARDIOLOGY | Facility: HOSPITAL | Age: 77
Discharge: HOME | End: 2022-12-01
Attending: INTERNAL MEDICINE
Payer: MEDICARE

## 2022-12-01 VITALS
HEIGHT: 64 IN | HEART RATE: 52 BPM | DIASTOLIC BLOOD PRESSURE: 68 MMHG | BODY MASS INDEX: 23.39 KG/M2 | SYSTOLIC BLOOD PRESSURE: 142 MMHG | WEIGHT: 137 LBS

## 2022-12-01 DIAGNOSIS — I44.7 LBBB (LEFT BUNDLE BRANCH BLOCK): ICD-10-CM

## 2022-12-01 DIAGNOSIS — I42.9 CARDIOMYOPATHY, UNSPECIFIED TYPE: ICD-10-CM

## 2022-12-01 LAB
ASCENDING AORTA: 3.8 CM
AV INDEX (PROSTH): 0.91
AV MEAN GRADIENT: 4 MMHG
AV PEAK GRADIENT: 6 MMHG
AV VALVE AREA: 3.16 CM2
AV VELOCITY RATIO: 0.86
BSA FOR ECHO PROCEDURE: 1.68 M2
CV ECHO LV RWT: 0.34 CM
DOP CALC AO PEAK VEL: 1.23 M/S
DOP CALC AO VTI: 27.62 CM
DOP CALC LVOT AREA: 3.5 CM2
DOP CALC LVOT DIAMETER: 2.1 CM
DOP CALC LVOT PEAK VEL: 1.06 M/S
DOP CALC LVOT STROKE VOLUME: 87.17 CM3
DOP CALCLVOT PEAK VEL VTI: 25.18 CM
E WAVE DECELERATION TIME: 321.7 MSEC
E/A RATIO: 0.54
E/E' RATIO: 11.8 M/S
ECHO LV POSTERIOR WALL: 0.78 CM (ref 0.6–1.1)
EJECTION FRACTION: 65 %
FRACTIONAL SHORTENING: 43 % (ref 28–44)
INTERVENTRICULAR SEPTUM: 1 CM (ref 0.6–1.1)
IVRT: 182.68 MSEC
LA MAJOR: 5.02 CM
LA MINOR: 4.81 CM
LA WIDTH: 4 CM
LEFT ATRIUM SIZE: 2.95 CM
LEFT ATRIUM VOLUME INDEX MOD: 33.3 ML/M2
LEFT ATRIUM VOLUME INDEX: 29.5 ML/M2
LEFT ATRIUM VOLUME MOD: 55.66 CM3
LEFT ATRIUM VOLUME: 49.27 CM3
LEFT INTERNAL DIMENSION IN SYSTOLE: 2.6 CM (ref 2.1–4)
LEFT VENTRICLE DIASTOLIC VOLUME INDEX: 56.93 ML/M2
LEFT VENTRICLE DIASTOLIC VOLUME: 95.07 ML
LEFT VENTRICLE MASS INDEX: 80 G/M2
LEFT VENTRICLE SYSTOLIC VOLUME INDEX: 14.7 ML/M2
LEFT VENTRICLE SYSTOLIC VOLUME: 24.63 ML
LEFT VENTRICULAR INTERNAL DIMENSION IN DIASTOLE: 4.55 CM (ref 3.5–6)
LEFT VENTRICULAR MASS: 133.25 G
LV LATERAL E/E' RATIO: 9.83 M/S
LV SEPTAL E/E' RATIO: 14.75 M/S
MV PEAK A VEL: 1.1 M/S
MV PEAK E VEL: 0.59 M/S
MV STENOSIS PRESSURE HALF TIME: 93.29 MS
MV VALVE AREA P 1/2 METHOD: 2.36 CM2
PISA TR MAX VEL: 2.27 M/S
PULM VEIN S/D RATIO: 1.19
PV PEAK D VEL: 0.26 M/S
PV PEAK S VEL: 0.31 M/S
QEF: 68 %
RA MAJOR: 4.03 CM
RA PRESSURE: 3 MMHG
RA WIDTH: 2.85 CM
RIGHT VENTRICULAR END-DIASTOLIC DIMENSION: 2.65 CM
SINUS: 4.12 CM
STJ: 3.56 CM
TDI LATERAL: 0.06 M/S
TDI SEPTAL: 0.04 M/S
TDI: 0.05 M/S
TR MAX PG: 21 MMHG
TRICUSPID ANNULAR PLANE SYSTOLIC EXCURSION: 1.55 CM
TV REST PULMONARY ARTERY PRESSURE: 24 MMHG

## 2022-12-01 PROCEDURE — 93306 ECHO (CUPID ONLY): ICD-10-PCS | Mod: 26,,, | Performed by: INTERNAL MEDICINE

## 2022-12-01 PROCEDURE — 93306 TTE W/DOPPLER COMPLETE: CPT | Mod: 26,,, | Performed by: INTERNAL MEDICINE

## 2022-12-01 PROCEDURE — 93306 TTE W/DOPPLER COMPLETE: CPT

## 2022-12-08 ENCOUNTER — OFFICE VISIT (OUTPATIENT)
Dept: CARDIOLOGY | Facility: CLINIC | Age: 77
End: 2022-12-08
Payer: MEDICARE

## 2022-12-08 VITALS
DIASTOLIC BLOOD PRESSURE: 70 MMHG | BODY MASS INDEX: 23.9 KG/M2 | SYSTOLIC BLOOD PRESSURE: 130 MMHG | HEART RATE: 52 BPM | HEIGHT: 64 IN | WEIGHT: 140 LBS

## 2022-12-08 DIAGNOSIS — I25.10 CORONARY ARTERY DISEASE DUE TO CALCIFIED CORONARY LESION: Primary | ICD-10-CM

## 2022-12-08 DIAGNOSIS — I25.84 CORONARY ARTERY DISEASE DUE TO CALCIFIED CORONARY LESION: Primary | ICD-10-CM

## 2022-12-08 DIAGNOSIS — I77.89 ENLARGED THORACIC AORTA: ICD-10-CM

## 2022-12-08 DIAGNOSIS — I10 ESSENTIAL HYPERTENSION: ICD-10-CM

## 2022-12-08 DIAGNOSIS — R00.1 BRADYCARDIA: ICD-10-CM

## 2022-12-08 DIAGNOSIS — I42.9 CARDIOMYOPATHY, UNSPECIFIED TYPE: ICD-10-CM

## 2022-12-08 DIAGNOSIS — E78.00 HYPERCHOLESTEREMIA: ICD-10-CM

## 2022-12-08 DIAGNOSIS — I44.7 LBBB (LEFT BUNDLE BRANCH BLOCK): ICD-10-CM

## 2022-12-08 PROCEDURE — 99999 PR PBB SHADOW E&M-EST. PATIENT-LVL V: ICD-10-PCS | Mod: PBBFAC,,, | Performed by: INTERNAL MEDICINE

## 2022-12-08 PROCEDURE — 99499 RISK ADDL DX/OHS AUDIT: ICD-10-PCS | Mod: HCNC,S$GLB,, | Performed by: INTERNAL MEDICINE

## 2022-12-08 PROCEDURE — 1160F PR REVIEW ALL MEDS BY PRESCRIBER/CLIN PHARMACIST DOCUMENTED: ICD-10-PCS | Mod: CPTII,S$GLB,, | Performed by: INTERNAL MEDICINE

## 2022-12-08 PROCEDURE — 1101F PR PT FALLS ASSESS DOC 0-1 FALLS W/OUT INJ PAST YR: ICD-10-PCS | Mod: CPTII,S$GLB,, | Performed by: INTERNAL MEDICINE

## 2022-12-08 PROCEDURE — 3288F PR FALLS RISK ASSESSMENT DOCUMENTED: ICD-10-PCS | Mod: CPTII,S$GLB,, | Performed by: INTERNAL MEDICINE

## 2022-12-08 PROCEDURE — 93000 EKG 12-LEAD: ICD-10-PCS | Mod: S$GLB,,, | Performed by: INTERNAL MEDICINE

## 2022-12-08 PROCEDURE — 99213 OFFICE O/P EST LOW 20 MIN: CPT | Mod: S$GLB,,, | Performed by: INTERNAL MEDICINE

## 2022-12-08 PROCEDURE — 3075F PR MOST RECENT SYSTOLIC BLOOD PRESS GE 130-139MM HG: ICD-10-PCS | Mod: CPTII,S$GLB,, | Performed by: INTERNAL MEDICINE

## 2022-12-08 PROCEDURE — 1159F PR MEDICATION LIST DOCUMENTED IN MEDICAL RECORD: ICD-10-PCS | Mod: CPTII,S$GLB,, | Performed by: INTERNAL MEDICINE

## 2022-12-08 PROCEDURE — 1101F PT FALLS ASSESS-DOCD LE1/YR: CPT | Mod: CPTII,S$GLB,, | Performed by: INTERNAL MEDICINE

## 2022-12-08 PROCEDURE — 1160F RVW MEDS BY RX/DR IN RCRD: CPT | Mod: CPTII,S$GLB,, | Performed by: INTERNAL MEDICINE

## 2022-12-08 PROCEDURE — 93000 ELECTROCARDIOGRAM COMPLETE: CPT | Mod: S$GLB,,, | Performed by: INTERNAL MEDICINE

## 2022-12-08 PROCEDURE — 1126F AMNT PAIN NOTED NONE PRSNT: CPT | Mod: CPTII,S$GLB,, | Performed by: INTERNAL MEDICINE

## 2022-12-08 PROCEDURE — 3075F SYST BP GE 130 - 139MM HG: CPT | Mod: CPTII,S$GLB,, | Performed by: INTERNAL MEDICINE

## 2022-12-08 PROCEDURE — 3078F DIAST BP <80 MM HG: CPT | Mod: CPTII,S$GLB,, | Performed by: INTERNAL MEDICINE

## 2022-12-08 PROCEDURE — 3078F PR MOST RECENT DIASTOLIC BLOOD PRESSURE < 80 MM HG: ICD-10-PCS | Mod: CPTII,S$GLB,, | Performed by: INTERNAL MEDICINE

## 2022-12-08 PROCEDURE — 1159F MED LIST DOCD IN RCRD: CPT | Mod: CPTII,S$GLB,, | Performed by: INTERNAL MEDICINE

## 2022-12-08 PROCEDURE — 3288F FALL RISK ASSESSMENT DOCD: CPT | Mod: CPTII,S$GLB,, | Performed by: INTERNAL MEDICINE

## 2022-12-08 PROCEDURE — 99999 PR PBB SHADOW E&M-EST. PATIENT-LVL V: CPT | Mod: PBBFAC,,, | Performed by: INTERNAL MEDICINE

## 2022-12-08 PROCEDURE — 99499 UNLISTED E&M SERVICE: CPT | Mod: HCNC,S$GLB,, | Performed by: INTERNAL MEDICINE

## 2022-12-08 PROCEDURE — 1126F PR PAIN SEVERITY QUANTIFIED, NO PAIN PRESENT: ICD-10-PCS | Mod: CPTII,S$GLB,, | Performed by: INTERNAL MEDICINE

## 2022-12-08 PROCEDURE — 99213 PR OFFICE/OUTPT VISIT, EST, LEVL III, 20-29 MIN: ICD-10-PCS | Mod: S$GLB,,, | Performed by: INTERNAL MEDICINE

## 2022-12-08 RX ORDER — TRETINOIN 1 MG/G
CREAM TOPICAL NIGHTLY
COMMUNITY
Start: 2022-08-07

## 2022-12-08 NOTE — PROGRESS NOTES
Subjective:     Problem List:  CACS >600   Hyperlipidemia   Hypertension  LBBB since at least 2006  Cardiomyopathy - resolved  Mildly enlarged aortic root and ascending aorta    HPI:   Nesha Landa is a 77 y.o. female who presents for annual visit regarding CAD and mild enlargement of ascending aorta and aortic root. Mrs. Landa is doing very well. She denies chest pain/pressure/tightness/discomfort, dyspnea on regular exertion, orthopnea, PND, peripheral edema, sustained palpitations, syncope or claudication symptoms.  She has a small young dog that keeps her physically active. States that she walks 6-7 blocks at least 2-3 times per day. She eats a healthy diet. Portion control, lean proteins, enjoys vegetables and fruit. She avoids excessive salt. She has mild edema bilat LE, but not a significant concerns. Discussed elevating feet and compression stockings.     Echocardiogram 12/2022:  Aortic root at the sinuses of Valsalva 4.1 cm (also 4.1 cm in 2016) and ascending aorta 3.8 cm (3.6 cm in 2016).      Review of patient's allergies indicates:   Allergen Reactions    Ciprofloxacin Nausea Only    Flagyl  [metronidazole] Nausea Only    Sulfa (sulfonamide antibiotics)         Current Outpatient Medications   Medication Sig    ALPRAZolam (XANAX) 0.25 MG tablet TAKE 1 TABLET BY MOUTH EVERY DAY AS NEEDED FOR ANXIETY    amLODIPine (NORVASC) 5 MG tablet TAKE 1 TABLET EVERY DAY    amoxicillin (AMOXIL) 875 MG tablet     ascorbic acid/collagen hydr (COLLAGEN PLUS VITAMIN C ORAL) Take 3 tablets by mouth once daily.    aspirin (ECOTRIN) 81 MG EC tablet Take 81 mg by mouth once daily.    BIOTIN ORAL Take 1 tablet by mouth once daily.    butalbital-acetaminophen-caffeine -40 mg (FIORICET, ESGIC) -40 mg per tablet Take 1 tablet by mouth as needed.    calcium carbonate-vitamin D3 600-125 mg-unit Tab Take 1 tablet by mouth once daily.    carvediloL (COREG) 25 MG tablet TAKE 1 TABLET TWICE DAILY    cholecalciferol,  vitamin D3, 1,000 unit capsule Take 1 capsule by mouth Daily.      clopidogreL (PLAVIX) 75 mg tablet TAKE 1 TABLET(75 MG) BY MOUTH EVERY DAY    coenzyme Q10 200 mg capsule Take 200 mg by mouth once daily.    cyanocobalamin, vitamin B-12, 50 mcg tablet Take 1 tablet by mouth Daily.      cyclobenzaprine (FLEXERIL) 10 MG tablet TAKE 1 TABLET BY MOUTH EVERY 8 TO 12 HOURS AS NEEDED FOR MUSCLE SPASMS    ELDERBERRY FRUIT ORAL Take 5 mLs by mouth 2 (two) times daily.    fish oil-omega-3 fatty acids 300-1,000 mg capsule Take 1 g by mouth once daily.    FLUAD QUAD 2021-22,65Y UP,,PF, 60 mcg (15 mcg x 4)/0.5 mL Syrg     fluconazole (DIFLUCAN) 150 MG Tab Take one tablet today and repeat in three days if still symptomatic (Patient not taking: Reported on 11/26/2022)    fluticasone (FLONASE) 50 mcg/actuation nasal spray 1 spray as needed.     folic acid (FOLVITE) 400 MCG tablet Take 400 mcg by mouth once daily.    LACTOBACILLUS ACIDOPHILUS (ACIDOPHILUS ORAL) Take 1 capsule by mouth once daily.    meclizine (ANTIVERT) 12.5 mg tablet TAKE ONE TABLET BY MOUTH EVERY 8 HOURS AS NEEDED FOR dizziness    montelukast (SINGULAIR) 10 mg tablet Take 10 mg by mouth every evening.    multivit with minerals/lutein (MULTIVITAMIN 50 PLUS ORAL) Take 1 tablet by mouth once daily.    mupirocin (BACTROBAN) 2 % ointment     nitrofurantoin, macrocrystal-monohydrate, (MACROBID) 100 MG capsule Take 1 capsule (100 mg total) by mouth 2 (two) times daily. (Patient not taking: Reported on 11/26/2022)    omeprazole (PRILOSEC) 40 MG capsule TK 1 C PO QD    ondansetron (ZOFRAN-ODT) 4 MG TbDL Take 4 mg by mouth as needed.     polyethylene glycol (GLYCOLAX) 17 gram/dose powder Take by mouth as needed. Pt taking 1 capful prn.    predniSONE (DELTASONE) 5 MG tablet     promethazine (PHENERGAN) 25 MG tablet TAKE 1 TABLET(25 MG) BY MOUTH EVERY 6 HOURS AS NEEDED FOR NAUSEA    rosuvastatin (CRESTOR) 40 MG Tab TAKE 1 TABLET(40 MG) BY MOUTH EVERY DAY    SIMETHICONE (GAS-X  "ORAL) Take 1 tablet by mouth as needed.    traZODone (DESYREL) 50 MG tablet Take 1 tablet (50 mg total) by mouth nightly as needed for Insomnia. Do not take with Xanax or other sedating medications    TURMERIC ROOT EXTRACT ORAL Take 2 capsules by mouth once daily.    valsartan (DIOVAN) 320 MG tablet TAKE 1 TABLET EVERY DAY    vitamin E 400 UNIT capsule Take 400 mg by mouth once daily.     No current facility-administered medications for this visit.       Social history:  Nesha Landa  reports that she has never smoked. She has never used smokeless tobacco. She reports that she does not drink alcohol and does not use drugs.      Objective:     /70   Pulse (!) 52   Ht 5' 4" (1.626 m)   Wt 63.5 kg (139 lb 15.9 oz)   BMI 24.03 kg/m²    Physical Exam  Vitals and nursing note reviewed.   Constitutional:       Appearance: Normal appearance.   HENT:      Head: Normocephalic and atraumatic.   Neck:      Vascular: No carotid bruit or hepatojugular reflux.   Cardiovascular:      Rate and Rhythm: Normal rate and regular rhythm.      Pulses:           Radial pulses are 2+ on the right side and 2+ on the left side.        Dorsalis pedis pulses are 2+ on the right side and 2+ on the left side.        Posterior tibial pulses are 2+ on the right side and 2+ on the left side.      Heart sounds: S1 normal and S2 normal.   Pulmonary:      Effort: Pulmonary effort is normal.      Breath sounds: Normal breath sounds.   Abdominal:      General: Bowel sounds are normal.      Palpations: Abdomen is soft.      Tenderness: There is no abdominal tenderness.   Musculoskeletal:      Right lower leg: Edema present.      Left lower leg: Edema present.      Comments: Trace non pitting edema bilat LE   Feet:      Right foot:      Skin integrity: Skin integrity normal.      Left foot:      Skin integrity: Skin integrity normal.   Neurological:      Mental Status: She is alert.   Psychiatric:         Behavior: Behavior is cooperative. "           Lab Results   Component Value Date    CHOL 151 12/01/2022    HDL 80 (H) 12/01/2022    LDLCALC 58.4 (L) 12/01/2022    TRIG 63 12/01/2022    CHOLHDL 53.0 (H) 12/01/2022     Lab Results   Component Value Date    GLU 98 12/01/2022    CREATININE 0.8 12/01/2022    BUN 12 12/01/2022     (L) 12/01/2022    K 4.7 12/01/2022     12/01/2022    CO2 26 12/01/2022     Lab Results   Component Value Date    ALT 14 12/01/2022    AST 16 12/01/2022    ALKPHOS 57 12/01/2022    BILITOT 0.7 12/01/2022       Results for orders placed during the hospital encounter of 12/01/22    Echo    Interpretation Summary  · The left ventricle is normal in size with normal systolic function.  · The estimated ejection fraction is 65%.  · The quantitatively derived ejection fraction is 68%.  · Normal left ventricular diastolic function.  · Normal right ventricular size with normal right ventricular systolic function.  · Normal central venous pressure (3 mmHg).  · The estimated PA systolic pressure is 24 mmHg.  · The aortic root at the sinuses of Valsalva is mildly dilated measuring 4.1 cm.  · The ascending aorta is mildly dilated measuring 3.8 cm.         Assessment and Plan:     1. Coronary artery disease due to calcified coronary lesion  Stable, continue aspirin 81 mg qD    2. Essential hypertension  - IN OFFICE EKG 12-LEAD (to Muse)  Continue carvedilol and amlodipine.   She has tolerable/mild dependent edema. Advised to monitor and recommended elevation and compression stockings as needed.     3. Hypercholesteremia  Stable, continue rosuvastatin 40 mg.   LDL goal < 70. Last LDL 58.4 (12/1/2022)    4. Enlarged thoracic aorta  Mildly enlarged aortic root and ascending aorta.  No significant change since 2016. Moving forward we will consider echo every 2-3 years.     5. Bradycardia - asymptomatic    6. LBBB (left bundle branch block)        This patient was seen in conjunction with Dr. Warren, who also examined the patient and  agrees with the ANNE's assessment and plan.     Debbie Boateng PA-C  Cardiology     Orders placed during this encounter:     There are no diagnoses linked to this encounter.     No follow-ups on file.

## 2022-12-08 NOTE — PATIENT INSTRUCTIONS
We recommend that your first degree relatives be screened for enlarged aorta. This can easily be ruled out if a patient has had an echocardiogram.     When you see Dr. Angeles again, we suggest having your sodium level checked again. It was slightly below normal on your recent blood test, otherwise it was normal.

## 2022-12-21 RX ORDER — PROMETHAZINE HYDROCHLORIDE 25 MG/1
25 TABLET ORAL EVERY 6 HOURS PRN
Qty: 20 TABLET | Refills: 2 | OUTPATIENT
Start: 2022-12-21

## 2022-12-21 NOTE — TELEPHONE ENCOUNTER
----- Message from Crystal Marshall sent at 12/21/2022  1:42 PM CST -----  Contact: [phone]  Pt called to check the status of her RX promethazine. Please Advise

## 2023-01-03 ENCOUNTER — TELEPHONE (OUTPATIENT)
Dept: INTERNAL MEDICINE | Facility: CLINIC | Age: 78
End: 2023-01-03
Payer: MEDICARE

## 2023-01-03 NOTE — TELEPHONE ENCOUNTER
----- Message from Jaimie Lobo sent at 1/3/2023  9:10 AM CST -----  Contact: [phone]  type: Lab    Caller is requesting to schedule their Lab appointment prior to annual appointment.  Order is not listed in EPIC.  Please enter order and contact patient to schedule.    Name of Caller:patient    Preferred Date and Time of Labs: 1 wk before annual    Date of Annual Physical Appointment:6/16/2023    Where would they like the lab performed?[facility]    Would the patient rather a call back or a response via [facility]? phone    Best Call Back Number:[phone]         0 = independent

## 2023-01-11 ENCOUNTER — TELEPHONE (OUTPATIENT)
Dept: OBSTETRICS AND GYNECOLOGY | Facility: CLINIC | Age: 78
End: 2023-01-11
Payer: MEDICARE

## 2023-01-11 NOTE — TELEPHONE ENCOUNTER
----- Message from Janel Martinez sent at 1/11/2023  3:55 PM CST -----  Name of Who is Calling:DANY JONES [[id number]]              What is the request in detail:Requesting a call back to get an appt for discharge at the [facility] location as soon as possible.               Can the clinic reply by [facility]:              What Number to Call Back if not in [facility]:[phone]

## 2023-01-18 ENCOUNTER — OFFICE VISIT (OUTPATIENT)
Dept: OBSTETRICS AND GYNECOLOGY | Facility: CLINIC | Age: 78
End: 2023-01-18
Payer: MEDICARE

## 2023-01-18 VITALS
WEIGHT: 132.25 LBS | SYSTOLIC BLOOD PRESSURE: 128 MMHG | BODY MASS INDEX: 22.58 KG/M2 | DIASTOLIC BLOOD PRESSURE: 82 MMHG | HEIGHT: 64 IN

## 2023-01-18 DIAGNOSIS — Z78.0 POSTMENOPAUSAL STATUS: ICD-10-CM

## 2023-01-18 DIAGNOSIS — N76.0 ACUTE VAGINITIS: Primary | ICD-10-CM

## 2023-01-18 PROCEDURE — 1159F PR MEDICATION LIST DOCUMENTED IN MEDICAL RECORD: ICD-10-PCS | Mod: HCNC,CPTII,S$GLB, | Performed by: OBSTETRICS & GYNECOLOGY

## 2023-01-18 PROCEDURE — 99213 PR OFFICE/OUTPT VISIT, EST, LEVL III, 20-29 MIN: ICD-10-PCS | Mod: HCNC,S$GLB,, | Performed by: OBSTETRICS & GYNECOLOGY

## 2023-01-18 PROCEDURE — 99999 PR PBB SHADOW E&M-EST. PATIENT-LVL II: CPT | Mod: PBBFAC,HCNC,, | Performed by: OBSTETRICS & GYNECOLOGY

## 2023-01-18 PROCEDURE — 99213 OFFICE O/P EST LOW 20 MIN: CPT | Mod: HCNC,S$GLB,, | Performed by: OBSTETRICS & GYNECOLOGY

## 2023-01-18 PROCEDURE — 3074F SYST BP LT 130 MM HG: CPT | Mod: HCNC,CPTII,S$GLB, | Performed by: OBSTETRICS & GYNECOLOGY

## 2023-01-18 PROCEDURE — 3074F PR MOST RECENT SYSTOLIC BLOOD PRESSURE < 130 MM HG: ICD-10-PCS | Mod: HCNC,CPTII,S$GLB, | Performed by: OBSTETRICS & GYNECOLOGY

## 2023-01-18 PROCEDURE — 3079F DIAST BP 80-89 MM HG: CPT | Mod: HCNC,CPTII,S$GLB, | Performed by: OBSTETRICS & GYNECOLOGY

## 2023-01-18 PROCEDURE — 1101F PR PT FALLS ASSESS DOC 0-1 FALLS W/OUT INJ PAST YR: ICD-10-PCS | Mod: HCNC,CPTII,S$GLB, | Performed by: OBSTETRICS & GYNECOLOGY

## 2023-01-18 PROCEDURE — 1126F AMNT PAIN NOTED NONE PRSNT: CPT | Mod: HCNC,CPTII,S$GLB, | Performed by: OBSTETRICS & GYNECOLOGY

## 2023-01-18 PROCEDURE — 81514 NFCT DS BV&VAGINITIS DNA ALG: CPT | Mod: HCNC | Performed by: OBSTETRICS & GYNECOLOGY

## 2023-01-18 PROCEDURE — 1160F PR REVIEW ALL MEDS BY PRESCRIBER/CLIN PHARMACIST DOCUMENTED: ICD-10-PCS | Mod: HCNC,CPTII,S$GLB, | Performed by: OBSTETRICS & GYNECOLOGY

## 2023-01-18 PROCEDURE — 3288F PR FALLS RISK ASSESSMENT DOCUMENTED: ICD-10-PCS | Mod: HCNC,CPTII,S$GLB, | Performed by: OBSTETRICS & GYNECOLOGY

## 2023-01-18 PROCEDURE — 1159F MED LIST DOCD IN RCRD: CPT | Mod: HCNC,CPTII,S$GLB, | Performed by: OBSTETRICS & GYNECOLOGY

## 2023-01-18 PROCEDURE — 1126F PR PAIN SEVERITY QUANTIFIED, NO PAIN PRESENT: ICD-10-PCS | Mod: HCNC,CPTII,S$GLB, | Performed by: OBSTETRICS & GYNECOLOGY

## 2023-01-18 PROCEDURE — 3288F FALL RISK ASSESSMENT DOCD: CPT | Mod: HCNC,CPTII,S$GLB, | Performed by: OBSTETRICS & GYNECOLOGY

## 2023-01-18 PROCEDURE — 3079F PR MOST RECENT DIASTOLIC BLOOD PRESSURE 80-89 MM HG: ICD-10-PCS | Mod: HCNC,CPTII,S$GLB, | Performed by: OBSTETRICS & GYNECOLOGY

## 2023-01-18 PROCEDURE — 1160F RVW MEDS BY RX/DR IN RCRD: CPT | Mod: HCNC,CPTII,S$GLB, | Performed by: OBSTETRICS & GYNECOLOGY

## 2023-01-18 PROCEDURE — 1101F PT FALLS ASSESS-DOCD LE1/YR: CPT | Mod: HCNC,CPTII,S$GLB, | Performed by: OBSTETRICS & GYNECOLOGY

## 2023-01-18 PROCEDURE — 99999 PR PBB SHADOW E&M-EST. PATIENT-LVL II: ICD-10-PCS | Mod: PBBFAC,HCNC,, | Performed by: OBSTETRICS & GYNECOLOGY

## 2023-01-18 NOTE — PROGRESS NOTES
Chief Complaint   Patient presents with    Vaginal Discharge       HPI:  Nesha Landa is a 77 y.o. female patient  who presents today for evaluation of vaginitis.  For the past week, she describes having mild vaginal itching as well as odor with a moderately thin discharge. No pelvic pain or fever.  History of hysterectomy.  Reports being seen by GI for evaluation of constipation, flatus, and nausea.    No LMP recorded. Patient is postmenopausal.    22 Mammogram: Negative, TC .76%    Past Medical History:   Diagnosis Date    Coronary arteriosclerosis     HTN (hypertension)     Hypercholesteremia     LBBB (left bundle branch block)     Osteopenia        Past Surgical History:   Procedure Laterality Date    CATARACT EXTRACTION      10 years+ OU    HYSTERECTOMY      partial colectomy           ROS:  GENERAL: Feeling well overall.   SKIN: Denies rash or lesions.   HEAD: Denies head injury or headache.   NODES: Denies enlarged lymph nodes.   CHEST: Denies chest pain or shortness of breath.   CARDIOVASCULAR: Denies palpitations or left sided chest pain.   ABDOMEN: Reports constipation, increased flatus, nausea.  URINARY: No dysuria or hematuria.  REPRODUCTIVE: See HPI.   BREASTS: Denies pain, lumps, or nipple discharge.   HEMATOLOGIC: No easy bruisability or excessive bleeding.   MUSCULOSKELETAL: Reports some joint pain discomfort.   NEUROLOGIC: Denies syncope or weakness.   PSYCHIATRIC: Denies depression.    PE:   (chaperone present during entire exam)  APPEARANCE: Well nourished, well developed, in no acute distress.  ABDOMEN: Soft. No tenderness or masses.   VULVA: Atrophic. No lesions. Normal female genitalia.  URETHRAL MEATUS: Normal size and location, no lesions, no prolapse.  VAGINA: Atrophic, minimal white discharge.  CERVIX: Surgically absent.      Diagnosis:  1. Acute vaginitis    2. Postmenopausal status          PLAN:    Orders Placed This Encounter    VAGINOSIS SCREEN BY DNA PROBE       Patient  was counseled today on vaginitis: etiologies, diagnosis, and treatment.  We will contact her with results of the Affirm.      Follow-up after testing.    I spent a total of 25 minutes on the day of the visit.This includes face to face time and non-face to face time preparing to see the patient (eg, review of tests), Obtaining and/or reviewing separately obtained history, Documenting clinical information in the electronic or other health record, Independently interpreting resultsand communicating results to the patient/family/caregiver, or Care coordination.

## 2023-01-20 ENCOUNTER — TELEPHONE (OUTPATIENT)
Dept: OBSTETRICS AND GYNECOLOGY | Facility: CLINIC | Age: 78
End: 2023-01-20
Payer: MEDICARE

## 2023-01-20 NOTE — TELEPHONE ENCOUNTER
----- Message from Ann Marie Shafer sent at 1/20/2023  8:35 AM CST -----  Regarding: pt test results  Name of Who is Calling:  DANY JONES [[id number]]        What is the request in detail:   Pt was in on Wed. She states that she is still itching and is asking if her test results are in wso she can get prescribed something. Please c/b to assist        Can the clinic reply by [facility]:          What [facility][phone]

## 2023-01-23 ENCOUNTER — TELEPHONE (OUTPATIENT)
Dept: OBSTETRICS AND GYNECOLOGY | Facility: CLINIC | Age: 78
End: 2023-01-23
Payer: MEDICARE

## 2023-01-23 NOTE — TELEPHONE ENCOUNTER
Called patient:    Discussed results of Affirm: Negative    Reports that after using Monistat, her vaginal symptoms have completely resolved.    To let us know if symptoms return.

## 2023-01-25 RX ORDER — CARVEDILOL 25 MG/1
TABLET ORAL
Qty: 180 TABLET | Refills: 3 | Status: SHIPPED | OUTPATIENT
Start: 2023-01-25 | End: 2024-02-05

## 2023-02-03 RX ORDER — ALPRAZOLAM 0.25 MG/1
0.25 TABLET ORAL DAILY PRN
Qty: 20 TABLET | Refills: 1 | Status: SHIPPED | OUTPATIENT
Start: 2023-02-03 | End: 2023-04-24 | Stop reason: SDUPTHER

## 2023-02-15 ENCOUNTER — TELEPHONE (OUTPATIENT)
Dept: OBSTETRICS AND GYNECOLOGY | Facility: CLINIC | Age: 78
End: 2023-02-15
Payer: MEDICARE

## 2023-02-15 DIAGNOSIS — N39.0 URINARY TRACT INFECTION WITHOUT HEMATURIA, SITE UNSPECIFIED: Primary | ICD-10-CM

## 2023-02-15 NOTE — TELEPHONE ENCOUNTER
----- Message from Giselle Masterson sent at 2/15/2023  3:08 PM CST -----  Contact: DANY JONES [[id number]]  Type: Call Back      Who called: DANY JONES [[id number]]      What is the request in detail: Patient is requesting a call back. Pt states that she think she has a UTI, she has some pressure while urinating. She states that she took an OTC medication and it has not been helping.   Please advise.     Can the clinic reply by [facility]? No      Would the patient rather a call back or a response via [facility]? Call back       Best call back number: [phone] (home)       Additional Information:

## 2023-02-16 ENCOUNTER — TELEPHONE (OUTPATIENT)
Dept: OBSTETRICS AND GYNECOLOGY | Facility: CLINIC | Age: 78
End: 2023-02-16
Payer: MEDICARE

## 2023-02-16 ENCOUNTER — LAB VISIT (OUTPATIENT)
Dept: LAB | Facility: HOSPITAL | Age: 78
End: 2023-02-16
Attending: OBSTETRICS & GYNECOLOGY
Payer: MEDICARE

## 2023-02-16 DIAGNOSIS — N39.0 URINARY TRACT INFECTION WITHOUT HEMATURIA, SITE UNSPECIFIED: Primary | ICD-10-CM

## 2023-02-16 DIAGNOSIS — N39.0 URINARY TRACT INFECTION WITHOUT HEMATURIA, SITE UNSPECIFIED: ICD-10-CM

## 2023-02-16 LAB
BACTERIA #/AREA URNS AUTO: ABNORMAL /HPF
BILIRUB UR QL STRIP: ABNORMAL
CLARITY UR REFRACT.AUTO: CLEAR
COLOR UR AUTO: YELLOW
GLUCOSE UR QL STRIP: NEGATIVE
HGB UR QL STRIP: NEGATIVE
HYALINE CASTS UR QL AUTO: 3 /LPF
KETONES UR QL STRIP: NEGATIVE
LEUKOCYTE ESTERASE UR QL STRIP: ABNORMAL
MICROSCOPIC COMMENT: ABNORMAL
NITRITE UR QL STRIP: NEGATIVE
NON-SQ EPI CELLS #/AREA URNS AUTO: 2 /HPF
PH UR STRIP: 6 [PH] (ref 5–8)
PROT UR QL STRIP: ABNORMAL
RBC #/AREA URNS AUTO: 2 /HPF (ref 0–4)
SP GR UR STRIP: 1.02 (ref 1–1.03)
SQUAMOUS #/AREA URNS AUTO: 1 /HPF
UNSPECIFIED CRY UR QL COMP ASSIST: <1
URN SPEC COLLECT METH UR: ABNORMAL
WBC #/AREA URNS AUTO: 18 /HPF (ref 0–5)

## 2023-02-16 PROCEDURE — 87086 URINE CULTURE/COLONY COUNT: CPT | Mod: HCNC | Performed by: OBSTETRICS & GYNECOLOGY

## 2023-02-16 PROCEDURE — 81001 URINALYSIS AUTO W/SCOPE: CPT | Mod: HCNC | Performed by: OBSTETRICS & GYNECOLOGY

## 2023-02-16 PROCEDURE — 87088 URINE BACTERIA CULTURE: CPT | Mod: HCNC | Performed by: OBSTETRICS & GYNECOLOGY

## 2023-02-16 PROCEDURE — 87077 CULTURE AEROBIC IDENTIFY: CPT | Mod: HCNC | Performed by: OBSTETRICS & GYNECOLOGY

## 2023-02-16 PROCEDURE — 87186 SC STD MICRODIL/AGAR DIL: CPT | Mod: HCNC | Performed by: OBSTETRICS & GYNECOLOGY

## 2023-02-16 NOTE — TELEPHONE ENCOUNTER
Testing ordered again. Patient states the check in told her the orders were incorrect. Rescheduled the patient for 12:15 and expressed apologies and asked her to check in. Patient will comply

## 2023-02-16 NOTE — TELEPHONE ENCOUNTER
----- Message from Janel Martinez sent at 2/16/2023 11:53 AM CST -----  Name of Who is Calling:DANY JONES [[id number]]              What is the request in detail:Requesting a call back to get order for urine testing. Patient is currently at her lab appointment waiting. Please send the order as soon as possible.               Can the clinic reply by [facility]:no              What Number to Call Back [facility] not [facility]:[phone]

## 2023-02-16 NOTE — TELEPHONE ENCOUNTER
----- Message from Calvin Silverio sent at 2/16/2023  3:51 PM CST -----  Pt calling for the results of her culture [phone]

## 2023-02-17 RX ORDER — NITROFURANTOIN 25; 75 MG/1; MG/1
100 CAPSULE ORAL 2 TIMES DAILY
Qty: 10 CAPSULE | Refills: 0 | Status: SHIPPED | OUTPATIENT
Start: 2023-02-17 | End: 2023-02-22

## 2023-02-17 NOTE — TELEPHONE ENCOUNTER
----- Message from Calvin Silverio sent at 2/17/2023  8:58 AM CST -----  Pt calling for the results of her culture [phone]

## 2023-02-17 NOTE — TELEPHONE ENCOUNTER
Called patient:    Reports urinary frequency and pressure.    Urinalysis with WBCs    Urine culture pending at this time.    Rx Macrobid sent to pharmacy.    To increase water intake.

## 2023-02-19 ENCOUNTER — TELEPHONE (OUTPATIENT)
Dept: OBSTETRICS AND GYNECOLOGY | Facility: CLINIC | Age: 78
End: 2023-02-19
Payer: MEDICARE

## 2023-02-19 LAB — BACTERIA UR CULT: ABNORMAL

## 2023-02-19 NOTE — TELEPHONE ENCOUNTER
She is currently on Macrobid.    Please let her know that her urine culture showed a UTI which is sensitive to Macrobid.    She should be feeling much better by now.    Thanks.

## 2023-03-07 NOTE — Clinical Note
Primary Care Providers: LALY Angeles MD, MD (General)  Your patient was seen today for a HRA visit. Gap(s) in care (HEDIS gaps) have been identified during this visit that require additional testing and possible follow up.  Orders Placed This Encounter     Mammo Digital Screening Bilat with [employer]os[employer]_CAD         Standing Status: Future         Standing Expiration Date: 5/7/2019         Order Specific Question: May the Radiologist modify the order per protocol to meet the clinical needs of the patient?         Answer: Yes     Ambulatory Referral to Ophthalmology         Referral Priority:Routine         Referral Type:Consultation         Referral Reason:Specialty Services Required         Requested Specialty:Ophthalmology         Number of Visits Requested:1   These orders were placed using [facility] approved protocol and any results will be forwarded to your office for appropriate follow up. I have included a copy of my visit note; please review the note and feel free to contact me w MDD (major depressive disorder)   F32.9   MDD (major depressive disorder)   F32.9   MDD (major depressive disorder)   F32.9   MDD (major depressive disorder)   F32.9   MDD (major depressive disorder)   F32.9   MDD (major depressive disorder)   F32.9   MDD (major depressive disorder)   F32.9   MDD (major depressive disorder)   F32.9

## 2023-03-28 ENCOUNTER — OFFICE VISIT (OUTPATIENT)
Dept: OPTOMETRY | Facility: CLINIC | Age: 78
End: 2023-03-28
Payer: COMMERCIAL

## 2023-03-28 DIAGNOSIS — Z96.1 PSEUDOPHAKIA OF BOTH EYES: ICD-10-CM

## 2023-03-28 DIAGNOSIS — I10 ESSENTIAL HYPERTENSION: Primary | ICD-10-CM

## 2023-03-28 DIAGNOSIS — Z83.518 FAMILY HISTORY OF MACULAR DEGENERATION: ICD-10-CM

## 2023-03-28 PROCEDURE — 99999 PR PBB SHADOW E&M-EST. PATIENT-LVL IV: ICD-10-PCS | Mod: PBBFAC,,, | Performed by: OPTOMETRIST

## 2023-03-28 PROCEDURE — 92014 COMPRE OPH EXAM EST PT 1/>: CPT | Mod: S$GLB,,, | Performed by: OPTOMETRIST

## 2023-03-28 PROCEDURE — 92014 PR EYE EXAM, EST PATIENT,COMPREHESV: ICD-10-PCS | Mod: S$GLB,,, | Performed by: OPTOMETRIST

## 2023-03-28 PROCEDURE — 99999 PR PBB SHADOW E&M-EST. PATIENT-LVL IV: CPT | Mod: PBBFAC,,, | Performed by: OPTOMETRIST

## 2023-03-28 NOTE — PROGRESS NOTES
HPI    76 Y/o female is here for routine eye exam with no C/o about ocular health      Pt denies pain and discomfort   No f/f    Eye med: Zaditor OU PRN   Last edited by Duyen Bhakta MA on 3/28/2023  3:03 PM.            Assessment /Plan     For exam results, see Encounter Report.    Essential hypertension    Pseudophakia of both eyes    Family history of macular degeneration      Minimal changes, Monitor condition. Patient to report any changes. RTC 1 year recheck.   2. Monitor condition. Patient to report any changes. RTC 1 year recheck.

## 2023-04-24 RX ORDER — ALPRAZOLAM 0.25 MG/1
0.25 TABLET ORAL DAILY PRN
Qty: 20 TABLET | Refills: 1 | Status: SHIPPED | OUTPATIENT
Start: 2023-04-24 | End: 2023-06-08 | Stop reason: SDUPTHER

## 2023-05-11 ENCOUNTER — TELEPHONE (OUTPATIENT)
Dept: OBSTETRICS AND GYNECOLOGY | Facility: CLINIC | Age: 78
End: 2023-05-11
Payer: MEDICARE

## 2023-05-11 ENCOUNTER — LAB VISIT (OUTPATIENT)
Dept: LAB | Facility: HOSPITAL | Age: 78
End: 2023-05-11
Attending: STUDENT IN AN ORGANIZED HEALTH CARE EDUCATION/TRAINING PROGRAM
Payer: MEDICARE

## 2023-05-11 DIAGNOSIS — R30.0 DYSURIA: Primary | ICD-10-CM

## 2023-05-11 DIAGNOSIS — R30.0 DYSURIA: ICD-10-CM

## 2023-05-11 PROCEDURE — 87086 URINE CULTURE/COLONY COUNT: CPT | Performed by: STUDENT IN AN ORGANIZED HEALTH CARE EDUCATION/TRAINING PROGRAM

## 2023-05-11 NOTE — TELEPHONE ENCOUNTER
Spoke with pt who requested to drop off urine sample to the [address] lab. Pt requested and accept 5/11 @10am at the [address] lab.

## 2023-05-11 NOTE — TELEPHONE ENCOUNTER
----- Message from Yenifer Carreno sent at 5/11/2023  3:59 PM CDT -----  Name of Who is Calling: DANY JONES [[id number]]          What is the request in detail: Pt is calling to discuss lab results. Please contact to further discuss and advise.            Can the clinic reply by [facility]: No           [facility]: [phone]

## 2023-05-12 ENCOUNTER — TELEPHONE (OUTPATIENT)
Dept: OBSTETRICS AND GYNECOLOGY | Facility: CLINIC | Age: 78
End: 2023-05-12
Payer: MEDICARE

## 2023-05-12 LAB
BACTERIA UR CULT: NORMAL
BACTERIA UR CULT: NORMAL

## 2023-05-12 NOTE — TELEPHONE ENCOUNTER
----- Message from Calvin Silverio sent at 5/12/2023  1:10 PM CDT -----  Pt calling for results of her urine test [phone]

## 2023-05-12 NOTE — TELEPHONE ENCOUNTER
Called pt and notified her that it does take up to 72 hour to get the results back. Pt verbalized understanding.

## 2023-05-13 ENCOUNTER — TELEPHONE (OUTPATIENT)
Dept: OBSTETRICS AND GYNECOLOGY | Facility: CLINIC | Age: 78
End: 2023-05-13
Payer: MEDICARE

## 2023-05-13 NOTE — TELEPHONE ENCOUNTER
Please let her know that her urine culture did not show a UTI or bladder infection.    If she is still experiencing urinary symptoms, she should see a urologist for evaluation.    Thanks.

## 2023-05-15 ENCOUNTER — TELEPHONE (OUTPATIENT)
Dept: OBSTETRICS AND GYNECOLOGY | Facility: CLINIC | Age: 78
End: 2023-05-15
Payer: MEDICARE

## 2023-05-15 NOTE — TELEPHONE ENCOUNTER
Contact pt with results. Per Dr. Renteria results are negative. Pt verbalized understanding and had no further questions.

## 2023-05-15 NOTE — TELEPHONE ENCOUNTER
----- Message from Debbie Renteria MD sent at 5/15/2023  8:25 AM CDT -----  Will you let her know that your urine culture is negative for a UTI. Thanks!  ----- Message -----  From: [facility]  Sent: 5/12/2023  10:02 PM CDT  To: Debbie Renteria MD

## 2023-05-17 ENCOUNTER — PES CALL (OUTPATIENT)
Dept: ADMINISTRATIVE | Facility: CLINIC | Age: 78
End: 2023-05-17
Payer: MEDICARE

## 2023-06-08 RX ORDER — ALPRAZOLAM 0.25 MG/1
0.25 TABLET ORAL DAILY PRN
Qty: 20 TABLET | Refills: 1 | Status: SHIPPED | OUTPATIENT
Start: 2023-06-08 | End: 2023-08-31 | Stop reason: SDUPTHER

## 2023-06-09 ENCOUNTER — TELEPHONE (OUTPATIENT)
Dept: INTERNAL MEDICINE | Facility: CLINIC | Age: 78
End: 2023-06-09
Payer: MEDICARE

## 2023-06-09 ENCOUNTER — LAB VISIT (OUTPATIENT)
Dept: LAB | Facility: HOSPITAL | Age: 78
End: 2023-06-09
Attending: INTERNAL MEDICINE
Payer: MEDICARE

## 2023-06-09 DIAGNOSIS — I10 ESSENTIAL HYPERTENSION: ICD-10-CM

## 2023-06-09 DIAGNOSIS — E78.5 DYSLIPIDEMIA: ICD-10-CM

## 2023-06-09 DIAGNOSIS — R79.9 ABNORMAL FINDING OF BLOOD CHEMISTRY, UNSPECIFIED: ICD-10-CM

## 2023-06-09 DIAGNOSIS — I10 ESSENTIAL HYPERTENSION: Primary | ICD-10-CM

## 2023-06-09 LAB
BACTERIA #/AREA URNS AUTO: ABNORMAL /HPF
BILIRUB UR QL STRIP: NEGATIVE
CLARITY UR REFRACT.AUTO: CLEAR
COLOR UR AUTO: YELLOW
GLUCOSE UR QL STRIP: NEGATIVE
HGB UR QL STRIP: NEGATIVE
HYALINE CASTS UR QL AUTO: 0 /LPF
KETONES UR QL STRIP: NEGATIVE
LEUKOCYTE ESTERASE UR QL STRIP: ABNORMAL
MICROSCOPIC COMMENT: ABNORMAL
NITRITE UR QL STRIP: NEGATIVE
NON-SQ EPI CELLS #/AREA URNS AUTO: 2 /HPF
PH UR STRIP: 8 [PH] (ref 5–8)
PROT UR QL STRIP: ABNORMAL
RBC #/AREA URNS AUTO: 11 /HPF (ref 0–4)
SP GR UR STRIP: 1.02 (ref 1–1.03)
SQUAMOUS #/AREA URNS AUTO: 4 /HPF
URN SPEC COLLECT METH UR: ABNORMAL
WBC #/AREA URNS AUTO: 68 /HPF (ref 0–5)

## 2023-06-09 PROCEDURE — 81001 URINALYSIS AUTO W/SCOPE: CPT | Performed by: INTERNAL MEDICINE

## 2023-06-13 ENCOUNTER — TELEPHONE (OUTPATIENT)
Dept: OBSTETRICS AND GYNECOLOGY | Facility: CLINIC | Age: 78
End: 2023-06-13
Payer: MEDICARE

## 2023-06-13 DIAGNOSIS — Z12.31 SCREENING MAMMOGRAM FOR BREAST CANCER: Primary | ICD-10-CM

## 2023-06-13 NOTE — TELEPHONE ENCOUNTER
----- Message from Angi Mcgregor sent at 6/13/2023  2:00 PM CDT -----  Regarding: Mammogram ORders  Contact: Lazaro @ [phone]  Pt is calling to get Dr to place Mammogram orders in system so that she can make Mammogram appt. Asking for a call back

## 2023-06-14 ENCOUNTER — TELEPHONE (OUTPATIENT)
Dept: OBSTETRICS AND GYNECOLOGY | Facility: CLINIC | Age: 78
End: 2023-06-14
Payer: MEDICARE

## 2023-06-14 NOTE — TELEPHONE ENCOUNTER
----- Message from Lalitha Andersen MA sent at 6/13/2023  5:14 PM CDT -----  My Note   5:14 PM    Edit  Delete  Copy    Mammogram ordered to be scheduled with her [employer] with Dr Mireles      My Note Signed   5:13 PM    Addend  Delete  Copy    ----- Message from Angi Mcgregor sent at 6/13/2023  2:00 PM CDT -----  Regarding: Mammogram ORders  Contact: Pt @ [phone]  Pt is calling to get Dr to place Mammogram orders in system so that she can make Mammogram appt. Asking for a call back

## 2023-06-14 NOTE — TELEPHONE ENCOUNTER
----- Message from Lalitha Andersen MA sent at 6/13/2023  5:14 PM CDT -----  My Note   5:14 PM    Edit  Delete  Copy    Mammogram ordered to be scheduled with her [employer] with Dr Mireles      My Note Signed   5:13 PM    Addend  Delete  Copy    ----- Message from Angi Mcgregor sent at 6/13/2023  2:00 PM CDT -----  Regarding: Mammogram ORders  Contact: Pt @ [phone]  Pt is calling to get Dr to place Mammogram orders in system so that she can make Mammogram appt. Asking for a call back

## 2023-06-15 ENCOUNTER — PES CALL (OUTPATIENT)
Dept: ADMINISTRATIVE | Facility: CLINIC | Age: 78
End: 2023-06-15
Payer: MEDICARE

## 2023-06-16 ENCOUNTER — LAB VISIT (OUTPATIENT)
Dept: LAB | Facility: HOSPITAL | Age: 78
End: 2023-06-16
Attending: INTERNAL MEDICINE
Payer: MEDICARE

## 2023-06-16 ENCOUNTER — OFFICE VISIT (OUTPATIENT)
Dept: INTERNAL MEDICINE | Facility: CLINIC | Age: 78
End: 2023-06-16
Payer: MEDICARE

## 2023-06-16 VITALS
HEIGHT: 64 IN | DIASTOLIC BLOOD PRESSURE: 64 MMHG | RESPIRATION RATE: 17 BRPM | SYSTOLIC BLOOD PRESSURE: 120 MMHG | BODY MASS INDEX: 23.68 KG/M2 | OXYGEN SATURATION: 95 % | HEART RATE: 57 BPM | TEMPERATURE: 98 F | WEIGHT: 138.69 LBS

## 2023-06-16 DIAGNOSIS — N30.00 ACUTE CYSTITIS WITHOUT HEMATURIA: ICD-10-CM

## 2023-06-16 DIAGNOSIS — I77.89 ENLARGED THORACIC AORTA: ICD-10-CM

## 2023-06-16 DIAGNOSIS — Z12.11 COLON CANCER SCREENING: ICD-10-CM

## 2023-06-16 DIAGNOSIS — E78.5 DYSLIPIDEMIA: ICD-10-CM

## 2023-06-16 DIAGNOSIS — I10 ESSENTIAL HYPERTENSION: ICD-10-CM

## 2023-06-16 DIAGNOSIS — Z00.00 ENCOUNTER FOR PREVENTIVE HEALTH EXAMINATION: Primary | ICD-10-CM

## 2023-06-16 PROCEDURE — 1126F PR PAIN SEVERITY QUANTIFIED, NO PAIN PRESENT: ICD-10-PCS | Mod: CPTII,S$GLB,, | Performed by: INTERNAL MEDICINE

## 2023-06-16 PROCEDURE — 99397 PER PM REEVAL EST PAT 65+ YR: CPT | Mod: S$GLB,,, | Performed by: INTERNAL MEDICINE

## 2023-06-16 PROCEDURE — 3288F FALL RISK ASSESSMENT DOCD: CPT | Mod: CPTII,S$GLB,, | Performed by: INTERNAL MEDICINE

## 2023-06-16 PROCEDURE — 99999 PR PBB SHADOW E&M-EST. PATIENT-LVL V: CPT | Mod: PBBFAC,,, | Performed by: INTERNAL MEDICINE

## 2023-06-16 PROCEDURE — 3288F PR FALLS RISK ASSESSMENT DOCUMENTED: ICD-10-PCS | Mod: CPTII,S$GLB,, | Performed by: INTERNAL MEDICINE

## 2023-06-16 PROCEDURE — 1126F AMNT PAIN NOTED NONE PRSNT: CPT | Mod: CPTII,S$GLB,, | Performed by: INTERNAL MEDICINE

## 2023-06-16 PROCEDURE — 3074F SYST BP LT 130 MM HG: CPT | Mod: CPTII,S$GLB,, | Performed by: INTERNAL MEDICINE

## 2023-06-16 PROCEDURE — 3078F PR MOST RECENT DIASTOLIC BLOOD PRESSURE < 80 MM HG: ICD-10-PCS | Mod: CPTII,S$GLB,, | Performed by: INTERNAL MEDICINE

## 2023-06-16 PROCEDURE — 99397 PR PREVENTIVE VISIT,EST,65 & OVER: ICD-10-PCS | Mod: S$GLB,,, | Performed by: INTERNAL MEDICINE

## 2023-06-16 PROCEDURE — 1159F PR MEDICATION LIST DOCUMENTED IN MEDICAL RECORD: ICD-10-PCS | Mod: CPTII,S$GLB,, | Performed by: INTERNAL MEDICINE

## 2023-06-16 PROCEDURE — 1159F MED LIST DOCD IN RCRD: CPT | Mod: CPTII,S$GLB,, | Performed by: INTERNAL MEDICINE

## 2023-06-16 PROCEDURE — 99999 PR PBB SHADOW E&M-EST. PATIENT-LVL V: ICD-10-PCS | Mod: PBBFAC,,, | Performed by: INTERNAL MEDICINE

## 2023-06-16 PROCEDURE — 87086 URINE CULTURE/COLONY COUNT: CPT | Performed by: INTERNAL MEDICINE

## 2023-06-16 PROCEDURE — 3078F DIAST BP <80 MM HG: CPT | Mod: CPTII,S$GLB,, | Performed by: INTERNAL MEDICINE

## 2023-06-16 PROCEDURE — 1101F PR PT FALLS ASSESS DOC 0-1 FALLS W/OUT INJ PAST YR: ICD-10-PCS | Mod: CPTII,S$GLB,, | Performed by: INTERNAL MEDICINE

## 2023-06-16 PROCEDURE — 3074F PR MOST RECENT SYSTOLIC BLOOD PRESSURE < 130 MM HG: ICD-10-PCS | Mod: CPTII,S$GLB,, | Performed by: INTERNAL MEDICINE

## 2023-06-16 PROCEDURE — 1101F PT FALLS ASSESS-DOCD LE1/YR: CPT | Mod: CPTII,S$GLB,, | Performed by: INTERNAL MEDICINE

## 2023-06-16 RX ORDER — NITROFURANTOIN 25; 75 MG/1; MG/1
100 CAPSULE ORAL 2 TIMES DAILY
Qty: 14 CAPSULE | Refills: 0 | Status: SHIPPED | OUTPATIENT
Start: 2023-06-16 | End: 2023-12-20 | Stop reason: ALTCHOICE

## 2023-06-16 NOTE — PROGRESS NOTES
History of present illness:   [age] year lady in today for general health assessment.      Current medications:  Medications are all noted and reviewed.      Review of systems:  General: no fever, chills, generalized body aches. No unexpected weight loss.  Eyes:  No visual disturbances.  HEENT:  No hoarseness, dysphagia, ear pain.  Respiratory:  No cough, no shortness of breath.  Cardiovascular: no chest pain, palpitations, cough, exertional limb pain. No edema.  GI: no nausea, vomiting.  No abdominal pain. No change in bowel habits.  No melena, no hematochezia.  : no dysuria. No change in the color or character of the urine. No urinary frequency.  Musculoskeletal: no joint pain or swelling.  Neurologic:  No focal neurological complaints.  No headaches.  Skin:  No rashes or other concerns.  Psych:  No emotional issues    Health screenings:  Mammogram April of last year currently ordered to be scheduled.    Gynecologic care is up-to-date.    She is had both pneumococcal vaccines    Physical examination:  GENERAL:  Alert, appropriately groomed, no acute distress.  VS:  Blood pressure 120/64.  EYES: sclerae white ,nonicteric. PERRL.  HEENT:  Normocephalic. Ear canals and tympanic membranes normal. Mouth and pharynx normal. No thyromegaly. Trachea midline and freely mobile.  LUNGS:  Clear to ascultation and normal to percussion.  CARDIOVASCULAR:  Normal heart sounds.  No significant murmur. Carotids full bilaterally without bruit.  Pedal pulses intact .  No abdominal bruit.  No peripheral extremity edema.  GI: the abdomen is soft, no distension. No masses , tenderness, organomegaly.    LYMPHATIC:  No axillary, inguinal , cervical adenopathy.  MUSCULOSKELETAL:  Range of motion, stability and strength of the right and left upper and lower extremities normal. No swollen or tender joints  NEUROLOGIC:  DTR's normal. No gross motor or sensory deficits apparent, gait normal.  SKIN:  No rashes.   MS:  Alert, oriented ,  affect and mood all appropriate      Data:  Recent health maintenance lab data noted reviewed all reasonable.  Noted TSH slightly elevated at five.  Urinalysis appears infected.      Impression:  [age] year lady in reasonably good health with a couple of stable chronic medical conditions.    Hypertension well controlled.      Dyslipidemia on statin therapy.      Mildly enlarged ascending aorta followed by Cardiology    UTI cystitis.          Plan:   Macrobid 100 mg b.i.d. seven days.  Urine is submitted for culture and sensitivity.    Stool fecal immunochemical testing.    Return to clinic in six months for follow-up and will recheck TSH at that time

## 2023-06-18 LAB
BACTERIA UR CULT: NORMAL
BACTERIA UR CULT: NORMAL

## 2023-06-21 ENCOUNTER — OFFICE VISIT (OUTPATIENT)
Dept: OBSTETRICS AND GYNECOLOGY | Facility: CLINIC | Age: 78
End: 2023-06-21
Payer: MEDICARE

## 2023-06-21 VITALS
SYSTOLIC BLOOD PRESSURE: 100 MMHG | WEIGHT: 136.88 LBS | HEIGHT: 64 IN | BODY MASS INDEX: 23.37 KG/M2 | DIASTOLIC BLOOD PRESSURE: 60 MMHG

## 2023-06-21 DIAGNOSIS — Z01.419 WOMEN'S ANNUAL ROUTINE GYNECOLOGICAL EXAMINATION: Primary | ICD-10-CM

## 2023-06-21 DIAGNOSIS — Z78.0 POSTMENOPAUSAL STATUS: ICD-10-CM

## 2023-06-21 DIAGNOSIS — Z90.710 HISTORY OF HYSTERECTOMY: ICD-10-CM

## 2023-06-21 DIAGNOSIS — Z12.31 VISIT FOR SCREENING MAMMOGRAM: ICD-10-CM

## 2023-06-21 PROCEDURE — 1160F PR REVIEW ALL MEDS BY PRESCRIBER/CLIN PHARMACIST DOCUMENTED: ICD-10-PCS | Mod: CPTII,S$GLB,, | Performed by: OBSTETRICS & GYNECOLOGY

## 2023-06-21 PROCEDURE — G0101 PR CA SCREEN;PELVIC/BREAST EXAM: ICD-10-PCS | Mod: S$GLB,,, | Performed by: OBSTETRICS & GYNECOLOGY

## 2023-06-21 PROCEDURE — 3078F DIAST BP <80 MM HG: CPT | Mod: CPTII,S$GLB,, | Performed by: OBSTETRICS & GYNECOLOGY

## 2023-06-21 PROCEDURE — 3288F FALL RISK ASSESSMENT DOCD: CPT | Mod: CPTII,S$GLB,, | Performed by: OBSTETRICS & GYNECOLOGY

## 2023-06-21 PROCEDURE — 3078F PR MOST RECENT DIASTOLIC BLOOD PRESSURE < 80 MM HG: ICD-10-PCS | Mod: CPTII,S$GLB,, | Performed by: OBSTETRICS & GYNECOLOGY

## 2023-06-21 PROCEDURE — 1159F PR MEDICATION LIST DOCUMENTED IN MEDICAL RECORD: ICD-10-PCS | Mod: CPTII,S$GLB,, | Performed by: OBSTETRICS & GYNECOLOGY

## 2023-06-21 PROCEDURE — 1160F RVW MEDS BY RX/DR IN RCRD: CPT | Mod: CPTII,S$GLB,, | Performed by: OBSTETRICS & GYNECOLOGY

## 2023-06-21 PROCEDURE — 3074F PR MOST RECENT SYSTOLIC BLOOD PRESSURE < 130 MM HG: ICD-10-PCS | Mod: CPTII,S$GLB,, | Performed by: OBSTETRICS & GYNECOLOGY

## 2023-06-21 PROCEDURE — 1126F PR PAIN SEVERITY QUANTIFIED, NO PAIN PRESENT: ICD-10-PCS | Mod: CPTII,S$GLB,, | Performed by: OBSTETRICS & GYNECOLOGY

## 2023-06-21 PROCEDURE — 1126F AMNT PAIN NOTED NONE PRSNT: CPT | Mod: CPTII,S$GLB,, | Performed by: OBSTETRICS & GYNECOLOGY

## 2023-06-21 PROCEDURE — 99999 PR PBB SHADOW E&M-EST. PATIENT-LVL IV: ICD-10-PCS | Mod: PBBFAC,,, | Performed by: OBSTETRICS & GYNECOLOGY

## 2023-06-21 PROCEDURE — 3288F PR FALLS RISK ASSESSMENT DOCUMENTED: ICD-10-PCS | Mod: CPTII,S$GLB,, | Performed by: OBSTETRICS & GYNECOLOGY

## 2023-06-21 PROCEDURE — 1101F PT FALLS ASSESS-DOCD LE1/YR: CPT | Mod: CPTII,S$GLB,, | Performed by: OBSTETRICS & GYNECOLOGY

## 2023-06-21 PROCEDURE — 3074F SYST BP LT 130 MM HG: CPT | Mod: CPTII,S$GLB,, | Performed by: OBSTETRICS & GYNECOLOGY

## 2023-06-21 PROCEDURE — 99999 PR PBB SHADOW E&M-EST. PATIENT-LVL IV: CPT | Mod: PBBFAC,,, | Performed by: OBSTETRICS & GYNECOLOGY

## 2023-06-21 PROCEDURE — 1101F PR PT FALLS ASSESS DOC 0-1 FALLS W/OUT INJ PAST YR: ICD-10-PCS | Mod: CPTII,S$GLB,, | Performed by: OBSTETRICS & GYNECOLOGY

## 2023-06-21 PROCEDURE — G0101 CA SCREEN;PELVIC/BREAST EXAM: HCPCS | Mod: S$GLB,,, | Performed by: OBSTETRICS & GYNECOLOGY

## 2023-06-21 PROCEDURE — 1159F MED LIST DOCD IN RCRD: CPT | Mod: CPTII,S$GLB,, | Performed by: OBSTETRICS & GYNECOLOGY

## 2023-06-21 NOTE — PROGRESS NOTES
"Nesha Landa is a 77 y.o. year old  who presents for annual exam.  She is S/P hysterectomy, not on hormones.  Denies bleeding but reports occasional sweats at night.  She was recently treated by her PCP Dr. Angeles for a UTI.  Otherwise, denies recent changes in her medical or surgical history.  Mammogram scheduled for 2023.  No gyn complaints.    Blood pressure 100/60, height 5' 4" (1.626 m), weight 62.1 kg (136 lb 14.5 oz).      2022 Mammogram:  Negative, TC: 0.76%    Past Medical History:   Diagnosis Date    Coronary arteriosclerosis     HTN (hypertension)     Hypercholesteremia     LBBB (left bundle branch block)     Osteopenia        Past Surgical History:   Procedure Laterality Date    CATARACT EXTRACTION      10 years+ OU    HYSTERECTOMY      partial colectomy         OB History          10    Para   5    Term   5            AB        Living   5         SAB        IAB        Ectopic        Multiple        Live Births   5                 ROS:  GENERAL: Feeling well overall.   SKIN: Denies rash or lesions.   HEAD: Denies head injury or headache.   NODES: Denies enlarged lymph nodes.   CHEST: Denies chest pain or shortness of breath.   CARDIOVASCULAR: Denies palpitations or left sided chest pain.   ABDOMEN: No abdominal pain, nausea, vomiting.  Reports some constipation.  URINARY: Reports recent treatment for UTI.  REPRODUCTIVE: See HPI.   BREASTS: Denies pain, lumps, or nipple discharge.   HEMATOLOGIC: No easy bruisability or excessive bleeding.   MUSCULOSKELETAL: Reports some knee discomfort.  NEUROLOGIC: Denies syncope or weakness.   PSYCHIATRIC: Denies depression.    PE:  (chaperone present during entire exam)  APPEARANCE: Well nourished, well developed, in no acute distress.  NODES: No inguinal lymph node enlargement.  ABDOMEN: Soft. No tenderness or masses. No hernias.  BREASTS: Symmetrical, no skin changes or visible lesions. No palpable masses, nipple discharge or " adenopathy bilaterally.  PELVIC: Atrophic external female genitalia without lesions. Normal hair distribution. Adequate perineal body, normal urethral meatus. Vagina atrophic without lesions or discharge. No significant cystocele or rectocele. Uterus and cervix surgically absent. Bimanual exam revealed no masses, tenderness or abnormality.  ANUS: Normal.    Diagnosis:  1. Women's annual routine gynecological examination    2. Postmenopausal status    3. History of hysterectomy    4. Visit for screening mammogram          PLAN:         Patient was counseled today on postmenopausal issues.  Mammogram scheduled 6/29/23.    Follow-up in 1 year.    This note was created with voice recognition software.  Grammatical, syntax and spelling errors may be inevitable.

## 2023-06-25 RX ORDER — VALSARTAN 320 MG/1
TABLET ORAL
Qty: 90 TABLET | Refills: 3 | Status: SHIPPED | OUTPATIENT
Start: 2023-06-25

## 2023-06-27 ENCOUNTER — LAB VISIT (OUTPATIENT)
Dept: LAB | Facility: HOSPITAL | Age: 78
End: 2023-06-27
Attending: INTERNAL MEDICINE
Payer: MEDICARE

## 2023-06-27 DIAGNOSIS — Z12.11 COLON CANCER SCREENING: ICD-10-CM

## 2023-06-27 PROCEDURE — 82274 ASSAY TEST FOR BLOOD FECAL: CPT | Performed by: INTERNAL MEDICINE

## 2023-06-29 ENCOUNTER — HOSPITAL ENCOUNTER (OUTPATIENT)
Dept: RADIOLOGY | Facility: HOSPITAL | Age: 78
Discharge: HOME | End: 2023-06-29
Attending: OBSTETRICS & GYNECOLOGY
Payer: MEDICARE

## 2023-06-29 DIAGNOSIS — Z12.31 SCREENING MAMMOGRAM FOR BREAST CANCER: ICD-10-CM

## 2023-06-29 PROCEDURE — 77067 SCR MAMMO BI INCL CAD: CPT | Mod: 26,,, | Performed by: RADIOLOGY

## 2023-06-29 PROCEDURE — 77063 BREAST TOMOSYNTHESIS BI: CPT | Mod: 26,,, | Performed by: RADIOLOGY

## 2023-06-29 PROCEDURE — 77067 SCR MAMMO BI INCL CAD: CPT | Mod: TC,PO

## 2023-06-29 PROCEDURE — 77067 MAMMO DIGITAL SCREENING BILAT WITH TOMO: ICD-10-PCS | Mod: 26,,, | Performed by: RADIOLOGY

## 2023-06-29 PROCEDURE — 77063 MAMMO DIGITAL SCREENING BILAT WITH TOMO: ICD-10-PCS | Mod: 26,,, | Performed by: RADIOLOGY

## 2023-07-01 ENCOUNTER — TELEPHONE (OUTPATIENT)
Dept: OBSTETRICS AND GYNECOLOGY | Facility: CLINIC | Age: 78
End: 2023-07-01
Payer: MEDICARE

## 2023-07-05 ENCOUNTER — PES CALL (OUTPATIENT)
Dept: ADMINISTRATIVE | Facility: CLINIC | Age: 78
End: 2023-07-05
Payer: MEDICARE

## 2023-07-06 LAB — HEMOCCULT STL QL IA: NEGATIVE

## 2023-08-15 ENCOUNTER — OFFICE VISIT (OUTPATIENT)
Dept: INTERNAL MEDICINE | Facility: CLINIC | Age: 78
End: 2023-08-15
Payer: MEDICARE

## 2023-08-15 VITALS
HEIGHT: 64 IN | BODY MASS INDEX: 23.61 KG/M2 | HEART RATE: 61 BPM | RESPIRATION RATE: 13 BRPM | DIASTOLIC BLOOD PRESSURE: 70 MMHG | SYSTOLIC BLOOD PRESSURE: 130 MMHG | WEIGHT: 138.31 LBS

## 2023-08-15 DIAGNOSIS — R26.9 ABNORMALITY OF GAIT AND MOBILITY: ICD-10-CM

## 2023-08-15 DIAGNOSIS — E78.00 HYPERCHOLESTEREMIA: ICD-10-CM

## 2023-08-15 DIAGNOSIS — R63.5 WEIGHT GAIN: ICD-10-CM

## 2023-08-15 DIAGNOSIS — I25.10 CORONARY ARTERY DISEASE DUE TO CALCIFIED CORONARY LESION: ICD-10-CM

## 2023-08-15 DIAGNOSIS — Z11.59 NEED FOR HEPATITIS C SCREENING TEST: ICD-10-CM

## 2023-08-15 DIAGNOSIS — M81.0 POSTMENOPAUSAL OSTEOPOROSIS: Chronic | ICD-10-CM

## 2023-08-15 DIAGNOSIS — E55.9 VITAMIN D DEFICIENCY: ICD-10-CM

## 2023-08-15 DIAGNOSIS — I10 ESSENTIAL HYPERTENSION: ICD-10-CM

## 2023-08-15 DIAGNOSIS — I77.810 ASCENDING AORTA DILATION: ICD-10-CM

## 2023-08-15 DIAGNOSIS — E78.5 DYSLIPIDEMIA: ICD-10-CM

## 2023-08-15 DIAGNOSIS — R79.89 ELEVATED TSH: ICD-10-CM

## 2023-08-15 DIAGNOSIS — K21.9 GASTROESOPHAGEAL REFLUX DISEASE, UNSPECIFIED WHETHER ESOPHAGITIS PRESENT: ICD-10-CM

## 2023-08-15 DIAGNOSIS — I25.84 CORONARY ARTERY DISEASE DUE TO CALCIFIED CORONARY LESION: ICD-10-CM

## 2023-08-15 DIAGNOSIS — R53.83 FATIGUE, UNSPECIFIED TYPE: ICD-10-CM

## 2023-08-15 DIAGNOSIS — Z00.00 ENCOUNTER FOR PREVENTIVE HEALTH EXAMINATION: Primary | ICD-10-CM

## 2023-08-15 DIAGNOSIS — G47.00 INSOMNIA, UNSPECIFIED TYPE: ICD-10-CM

## 2023-08-15 DIAGNOSIS — I77.89 ENLARGED THORACIC AORTA: ICD-10-CM

## 2023-08-15 DIAGNOSIS — R79.89 ABNORMAL TSH: ICD-10-CM

## 2023-08-15 PROCEDURE — 1126F PR PAIN SEVERITY QUANTIFIED, NO PAIN PRESENT: ICD-10-PCS | Mod: HCNC,CPTII,S$GLB, | Performed by: NURSE PRACTITIONER

## 2023-08-15 PROCEDURE — 1170F FXNL STATUS ASSESSED: CPT | Mod: HCNC,CPTII,S$GLB, | Performed by: NURSE PRACTITIONER

## 2023-08-15 PROCEDURE — 3078F PR MOST RECENT DIASTOLIC BLOOD PRESSURE < 80 MM HG: ICD-10-PCS | Mod: HCNC,CPTII,S$GLB, | Performed by: NURSE PRACTITIONER

## 2023-08-15 PROCEDURE — 1159F PR MEDICATION LIST DOCUMENTED IN MEDICAL RECORD: ICD-10-PCS | Mod: HCNC,CPTII,S$GLB, | Performed by: NURSE PRACTITIONER

## 2023-08-15 PROCEDURE — 99999 PR PBB SHADOW E&M-EST. PATIENT-LVL V: CPT | Mod: PBBFAC,HCNC,, | Performed by: NURSE PRACTITIONER

## 2023-08-15 PROCEDURE — 1160F RVW MEDS BY RX/DR IN RCRD: CPT | Mod: HCNC,CPTII,S$GLB, | Performed by: NURSE PRACTITIONER

## 2023-08-15 PROCEDURE — 1159F MED LIST DOCD IN RCRD: CPT | Mod: HCNC,CPTII,S$GLB, | Performed by: NURSE PRACTITIONER

## 2023-08-15 PROCEDURE — 1101F PR PT FALLS ASSESS DOC 0-1 FALLS W/OUT INJ PAST YR: ICD-10-PCS | Mod: HCNC,CPTII,S$GLB, | Performed by: NURSE PRACTITIONER

## 2023-08-15 PROCEDURE — 99999 PR PBB SHADOW E&M-EST. PATIENT-LVL V: ICD-10-PCS | Mod: PBBFAC,HCNC,, | Performed by: NURSE PRACTITIONER

## 2023-08-15 PROCEDURE — 3075F SYST BP GE 130 - 139MM HG: CPT | Mod: HCNC,CPTII,S$GLB, | Performed by: NURSE PRACTITIONER

## 2023-08-15 PROCEDURE — G0439 PR MEDICARE ANNUAL WELLNESS SUBSEQUENT VISIT: ICD-10-PCS | Mod: HCNC,S$GLB,, | Performed by: NURSE PRACTITIONER

## 2023-08-15 PROCEDURE — 3078F DIAST BP <80 MM HG: CPT | Mod: HCNC,CPTII,S$GLB, | Performed by: NURSE PRACTITIONER

## 2023-08-15 PROCEDURE — 1170F PR FUNCTIONAL STATUS ASSESSED: ICD-10-PCS | Mod: HCNC,CPTII,S$GLB, | Performed by: NURSE PRACTITIONER

## 2023-08-15 PROCEDURE — 3288F FALL RISK ASSESSMENT DOCD: CPT | Mod: HCNC,CPTII,S$GLB, | Performed by: NURSE PRACTITIONER

## 2023-08-15 PROCEDURE — 1126F AMNT PAIN NOTED NONE PRSNT: CPT | Mod: HCNC,CPTII,S$GLB, | Performed by: NURSE PRACTITIONER

## 2023-08-15 PROCEDURE — 1160F PR REVIEW ALL MEDS BY PRESCRIBER/CLIN PHARMACIST DOCUMENTED: ICD-10-PCS | Mod: HCNC,CPTII,S$GLB, | Performed by: NURSE PRACTITIONER

## 2023-08-15 PROCEDURE — 3288F PR FALLS RISK ASSESSMENT DOCUMENTED: ICD-10-PCS | Mod: HCNC,CPTII,S$GLB, | Performed by: NURSE PRACTITIONER

## 2023-08-15 PROCEDURE — 1101F PT FALLS ASSESS-DOCD LE1/YR: CPT | Mod: HCNC,CPTII,S$GLB, | Performed by: NURSE PRACTITIONER

## 2023-08-15 PROCEDURE — 3075F PR MOST RECENT SYSTOLIC BLOOD PRESS GE 130-139MM HG: ICD-10-PCS | Mod: HCNC,CPTII,S$GLB, | Performed by: NURSE PRACTITIONER

## 2023-08-15 PROCEDURE — G0439 PPPS, SUBSEQ VISIT: HCPCS | Mod: HCNC,S$GLB,, | Performed by: NURSE PRACTITIONER

## 2023-08-15 NOTE — PATIENT INSTRUCTIONS
Counseling and Referral of Other Preventative  (Italic type indicates deductible and co-insurance are waived)    Patient Name: Nesha Landa  Today's Date: 8/15/2023    Health Maintenance         Date Due Completion Date    Influenza Vaccine (1) 09/01/2023 12/8/2022    DEXA Scan 03/18/2024 3/18/2022    Aspirin/Antiplatelet Therapy 06/21/2024 6/21/2023    Lipid Panel    TSH & hep C screen ordered    Follow up w PCP in dec 2023 with labs    Check Dr Warren for follow up   6/9/2024 6/9/2023    TETANUS VACCINE 05/28/2030 5/28/2020          Orders Placed This Encounter   Procedures    TSH    Hepatitis C Antibody     The following information is provided to all patients.  This information is to help you find resources for any of the problems found today that may be affecting your health:                Living healthy guide: www.[facility].[place].gov      Understanding Diabetes: www.diabetes.org      Eating healthy: www.cdc.gov/healthyweight      CDC home safety checklist: www.cdc.gov/steadi/patient.html      Agency on Aging: www.goea.[place].gov      Alcoholics anonymous (AA): www.aa.org      Physical Activity: www.nohemi.nih.gov/ej3mwgq      Tobacco use: www.quitwithusla.org

## 2023-08-15 NOTE — PROGRESS NOTES
"Nesha Landa presented for a  Medicare AWV and comprehensive Health Risk Assessment today. The following components were reviewed and updated:    Medical history  Family History  Social history  Allergies and Current Medications  Health Risk Assessment  Health Maintenance  Care Team     ** See Completed Assessments for Annual Wellness Visit within the encounter summary.**       The following assessments were completed:  Living Situation  CAGE  Depression Screening  Timed Get Up and Go  Whisper Test  Cognitive Function Screening  Nutrition Screening  ADL Screening  PAQ Screening        Vitals:    08/15/23 1457   BP: 130/70   BP Location: Right arm   Patient Position: Sitting   Pulse: 61   Resp: 13   Weight: 62.8 kg (138 lb 5.4 oz)   Height: 5' 4" (1.626 m)     Body mass index is 23.75 kg/m².  Physical Exam  Constitutional:       Comments: Younger in appearance than age   HENT:      Right Ear: There is no impacted cerumen.      Left Ear: There is no impacted cerumen.   Eyes:      General: No scleral icterus.  Cardiovascular:      Rate and Rhythm: Normal rate and regular rhythm.   Pulmonary:      Effort: Pulmonary effort is normal.      Breath sounds: Normal breath sounds.   Abdominal:      Palpations: Abdomen is soft.   Musculoskeletal:         General: No swelling. Normal range of motion.   Skin:     General: Skin is warm and dry.   Neurological:      Mental Status: She is alert and oriented to person, place, and time.   Psychiatric:         Mood and Affect: Mood normal.         Behavior: Behavior normal.         Thought Content: Thought content normal.         Judgment: Judgment normal.            Medication review & Opioid screening perform during rooming section. No prescribed opioid medications noted.  Review for substance use disorder screening performed during rooming section. No substance abuse noted on screening.      Diagnoses and health risks identified today and associated recommendations/orders:    1. " Enlarged thoracic aorta  Stable on regimen & followed by cardiology    2. Insomnia, unspecified type  Stable on regimen & followed by PCP    3. Vitamin D deficiency  Stable on regimen & followed by PCP    4. Dyslipidemia  Stable on regimen & followed by cardiology    5. Gastroesophageal reflux disease, unspecified whether esophagitis present  Stable on regimen & followed by PCP    6. Postmenopausal osteoporosis  Stable on regimen & followed by PCP    7. Essential hypertension  Stable on regimen & followed by cardiology    8. Hypercholesteremia  Stable on regimen & followed by cardiology    9. Coronary artery disease due to calcified coronary lesion  Stable on regimen & followed by cardiology    10. Ascending aorta dilation  Stable on regimen & followed by cardiology    11. Abnormal TSH  Stable on regimen & followed by PCP    12. Elevated TSH  Stable on regimen & followed by PCP  - TSH; Future    13. Weight gain  Stable on regimen & followed by PCP  - TSH; Future    14. Fatigue, unspecified type  Stable on regimen & followed by PCP  - TSH; Future    15. Need for hepatitis C screening test-medicare care gap  - Hepatitis C Antibody; Future    16. Abnormality of gait and mobility  Stable on regimen & followed by PCP    17. Encounter for preventive health examination  Here for Health Risk Assessment/Annual Wellness Visit.  Health maintenance reviewed and updated. Follow up in one year.          Provided Nesha with a 5-10 year written screening schedule and personal prevention plan. Recommendations were developed using the USPSTF age appropriate recommendations. Education, counseling, and referrals were provided as needed. After Visit Summary printed and given to patient which includes a list of additional screenings\tests needed. Problem list reviewed w pt.     Rosa Elena Rosales NP I offered to discuss advanced care planning, including how to pick a person who would make decisions for you if you were unable to make them  for yourself, called a health care power of , and what kind of decisions you might make such as use of life sustaining treatments such as ventilators and tube feeding when faced with a life limiting illness recorded on a living will that they will need to know. (How you want to be cared for as you near the end of your natural life)     X  Patient has advanced directives written and agrees to provide copies to the institution.

## 2023-08-16 ENCOUNTER — TELEPHONE (OUTPATIENT)
Dept: INTERNAL MEDICINE | Facility: CLINIC | Age: 78
End: 2023-08-16
Payer: MEDICARE

## 2023-08-16 ENCOUNTER — TELEPHONE (OUTPATIENT)
Dept: CARDIOLOGY | Facility: CLINIC | Age: 78
End: 2023-08-16
Payer: MEDICARE

## 2023-08-16 PROBLEM — M81.0 POSTMENOPAUSAL OSTEOPOROSIS: Status: ACTIVE | Noted: 2018-03-07

## 2023-08-16 PROBLEM — Z00.00 ENCOUNTER FOR PREVENTIVE HEALTH EXAMINATION: Status: ACTIVE | Noted: 2023-08-16

## 2023-08-16 PROBLEM — Z00.00 ENCOUNTER FOR PREVENTIVE HEALTH EXAMINATION: Chronic | Status: ACTIVE | Noted: 2023-08-16

## 2023-08-16 NOTE — TELEPHONE ENCOUNTER
Called pt to schedule her apt's for December.     She c/o some some ankle swelling on her right side and this started about a week ago.    She denies redness or warm to the touch.

## 2023-08-16 NOTE — TELEPHONE ENCOUNTER
Mrs. Landa was seen yesterday 8/15 by Rosa Elena Rosales NP. She suggested that the pt be seen by a vascular doctor due to swelling in her legs.  Mrs. Landa would like your opinion if she needs to be seen by a vascular physician.       Please advise

## 2023-08-16 NOTE — TELEPHONE ENCOUNTER
----- Message from Robert Mena sent at 8/16/2023  7:51 AM CDT -----  Contact: Pt [phone]  Pt called in regards to getting lab orders in she states when she saw the NP on 08/15/23 she told her to get labs ordered and a f/u with Dr Angeles please advise .

## 2023-08-16 NOTE — TELEPHONE ENCOUNTER
----- Message from Zelalem Garvin sent at 8/16/2023  8:34 AM CDT -----  Regarding: Callback  Good morning,     Pt would like to have a callback from Marni about advice on vascular doctor.    Contact @ [phone]    Thanks

## 2023-08-25 NOTE — TELEPHONE ENCOUNTER
Pt states swelling on occasion in right ankle area only. Pt denies any other symptoms. Pt states swelling worse at night.  notified, advised pt monitor swelling, send a picture of her ankle if swelling recurs. Pt notified, verbalized understanding.

## 2023-08-31 ENCOUNTER — TELEPHONE (OUTPATIENT)
Dept: INTERNAL MEDICINE | Facility: CLINIC | Age: 78
End: 2023-08-31
Payer: MEDICARE

## 2023-08-31 RX ORDER — ALPRAZOLAM 0.25 MG/1
0.25 TABLET ORAL DAILY PRN
Qty: 20 TABLET | Refills: 1 | Status: SHIPPED | OUTPATIENT
Start: 2023-08-31 | End: 2023-10-10 | Stop reason: SDUPTHER

## 2023-08-31 NOTE — TELEPHONE ENCOUNTER
Called pt and let her know we received a refill request for her xanax and it was sent to the dr and we are waiting for approval  She VU

## 2023-08-31 NOTE — TELEPHONE ENCOUNTER
----- Message from Lorna Mg sent at 8/31/2023  3:02 PM CDT -----  Contact: [phone] Patient  Pt is calling in regards to the status of ALPRAZolam (XANAX) 0.25 MG tablet. Pt is asking if it can be filled today because Monday is a holiday. Thank you.

## 2023-08-31 NOTE — TELEPHONE ENCOUNTER
----- Message from Lorna Mg sent at 8/31/2023  3:02 PM CDT -----  Contact: [phone] Patient  Pt is calling in regards to the status of ALPRAZolam (XANAX) 0.25 MG tablet. Pt is asking if it can be filled today because Monday is a holiday. Thank you.

## 2023-09-11 RX ORDER — AMLODIPINE BESYLATE 5 MG/1
TABLET ORAL
Qty: 90 TABLET | Refills: 3 | Status: SHIPPED | OUTPATIENT
Start: 2023-09-11

## 2023-09-24 RX ORDER — ROSUVASTATIN CALCIUM 40 MG/1
40 TABLET, COATED ORAL DAILY
Qty: 90 TABLET | Refills: 3 | Status: SHIPPED | OUTPATIENT
Start: 2023-09-24

## 2023-10-10 RX ORDER — ALPRAZOLAM 0.25 MG/1
0.25 TABLET ORAL DAILY PRN
Qty: 20 TABLET | Refills: 1 | Status: SHIPPED | OUTPATIENT
Start: 2023-10-10 | End: 2023-12-19 | Stop reason: SDUPTHER

## 2023-11-20 PROBLEM — Z00.00 ENCOUNTER FOR PREVENTIVE HEALTH EXAMINATION: Chronic | Status: RESOLVED | Noted: 2023-08-16 | Resolved: 2023-11-20

## 2023-12-05 ENCOUNTER — TELEPHONE (OUTPATIENT)
Dept: PHARMACY | Facility: CLINIC | Age: 78
End: 2023-12-05
Payer: MEDICARE

## 2023-12-14 ENCOUNTER — LAB VISIT (OUTPATIENT)
Dept: LAB | Facility: HOSPITAL | Age: 78
End: 2023-12-14
Payer: MEDICARE

## 2023-12-14 DIAGNOSIS — Z11.59 NEED FOR HEPATITIS C SCREENING TEST: ICD-10-CM

## 2023-12-14 DIAGNOSIS — R53.83 FATIGUE, UNSPECIFIED TYPE: ICD-10-CM

## 2023-12-14 DIAGNOSIS — R63.5 WEIGHT GAIN: ICD-10-CM

## 2023-12-14 DIAGNOSIS — R79.89 ELEVATED TSH: ICD-10-CM

## 2023-12-14 LAB
HCV AB SERPL QL IA: NORMAL
T4 FREE SERPL-MCNC: 0.9 NG/DL (ref 0.71–1.51)
TSH SERPL DL<=0.005 MIU/L-ACNC: 4.37 UIU/ML (ref 0.4–4)

## 2023-12-14 PROCEDURE — 86803 HEPATITIS C AB TEST: CPT | Mod: HCNC | Performed by: NURSE PRACTITIONER

## 2023-12-14 PROCEDURE — 84443 ASSAY THYROID STIM HORMONE: CPT | Mod: HCNC | Performed by: NURSE PRACTITIONER

## 2023-12-14 PROCEDURE — 36415 COLL VENOUS BLD VENIPUNCTURE: CPT | Mod: HCNC,PO | Performed by: NURSE PRACTITIONER

## 2023-12-14 PROCEDURE — 84439 ASSAY OF FREE THYROXINE: CPT | Mod: HCNC | Performed by: NURSE PRACTITIONER

## 2023-12-18 ENCOUNTER — TELEPHONE (OUTPATIENT)
Dept: INTERNAL MEDICINE | Facility: CLINIC | Age: 78
End: 2023-12-18
Payer: MEDICARE

## 2023-12-18 NOTE — TELEPHONE ENCOUNTER
Pt notified of abnormal thyroid studies- has PCP appt this wk- she will address with Dr Angeles this week

## 2023-12-19 RX ORDER — ALPRAZOLAM 0.25 MG/1
0.25 TABLET ORAL DAILY PRN
Qty: 20 TABLET | Refills: 1 | Status: SHIPPED | OUTPATIENT
Start: 2023-12-19 | End: 2024-02-20 | Stop reason: SDUPTHER

## 2023-12-20 ENCOUNTER — OFFICE VISIT (OUTPATIENT)
Dept: INTERNAL MEDICINE | Facility: CLINIC | Age: 78
End: 2023-12-20
Payer: MEDICARE

## 2023-12-20 VITALS
BODY MASS INDEX: 23.31 KG/M2 | RESPIRATION RATE: 18 BRPM | HEART RATE: 58 BPM | HEIGHT: 64 IN | SYSTOLIC BLOOD PRESSURE: 136 MMHG | WEIGHT: 136.56 LBS | DIASTOLIC BLOOD PRESSURE: 80 MMHG | OXYGEN SATURATION: 96 % | TEMPERATURE: 98 F

## 2023-12-20 DIAGNOSIS — E03.8 SUBCLINICAL HYPOTHYROIDISM: ICD-10-CM

## 2023-12-20 DIAGNOSIS — I10 ESSENTIAL HYPERTENSION: Primary | ICD-10-CM

## 2023-12-20 PROCEDURE — 1126F PR PAIN SEVERITY QUANTIFIED, NO PAIN PRESENT: ICD-10-PCS | Mod: HCNC,CPTII,S$GLB, | Performed by: INTERNAL MEDICINE

## 2023-12-20 PROCEDURE — 99213 PR OFFICE/OUTPT VISIT, EST, LEVL III, 20-29 MIN: ICD-10-PCS | Mod: HCNC,S$GLB,, | Performed by: INTERNAL MEDICINE

## 2023-12-20 PROCEDURE — 1159F PR MEDICATION LIST DOCUMENTED IN MEDICAL RECORD: ICD-10-PCS | Mod: HCNC,CPTII,S$GLB, | Performed by: INTERNAL MEDICINE

## 2023-12-20 PROCEDURE — 3075F SYST BP GE 130 - 139MM HG: CPT | Mod: HCNC,CPTII,S$GLB, | Performed by: INTERNAL MEDICINE

## 2023-12-20 PROCEDURE — 3075F PR MOST RECENT SYSTOLIC BLOOD PRESS GE 130-139MM HG: ICD-10-PCS | Mod: HCNC,CPTII,S$GLB, | Performed by: INTERNAL MEDICINE

## 2023-12-20 PROCEDURE — 99999 PR PBB SHADOW E&M-EST. PATIENT-LVL V: CPT | Mod: PBBFAC,HCNC,, | Performed by: INTERNAL MEDICINE

## 2023-12-20 PROCEDURE — 99213 OFFICE O/P EST LOW 20 MIN: CPT | Mod: HCNC,S$GLB,, | Performed by: INTERNAL MEDICINE

## 2023-12-20 PROCEDURE — 3079F PR MOST RECENT DIASTOLIC BLOOD PRESSURE 80-89 MM HG: ICD-10-PCS | Mod: HCNC,CPTII,S$GLB, | Performed by: INTERNAL MEDICINE

## 2023-12-20 PROCEDURE — 1159F MED LIST DOCD IN RCRD: CPT | Mod: HCNC,CPTII,S$GLB, | Performed by: INTERNAL MEDICINE

## 2023-12-20 PROCEDURE — 3079F DIAST BP 80-89 MM HG: CPT | Mod: HCNC,CPTII,S$GLB, | Performed by: INTERNAL MEDICINE

## 2023-12-20 PROCEDURE — 1126F AMNT PAIN NOTED NONE PRSNT: CPT | Mod: HCNC,CPTII,S$GLB, | Performed by: INTERNAL MEDICINE

## 2023-12-20 PROCEDURE — 99999 PR PBB SHADOW E&M-EST. PATIENT-LVL V: ICD-10-PCS | Mod: PBBFAC,HCNC,, | Performed by: INTERNAL MEDICINE

## 2023-12-20 RX ORDER — LISINOPRIL 40 MG/1
TABLET ORAL
COMMUNITY

## 2023-12-20 RX ORDER — NYSTATIN 100000 U/G
OINTMENT TOPICAL
COMMUNITY
Start: 2023-11-15

## 2023-12-20 NOTE — PROGRESS NOTES
History of present illness:   78-year-old lady with hypertension, dyslipidemia, mild enlarged ascending aorta in for six-month follow-up.  On our health assessment earlier this year she had a mildly elevated TSH and we are rechecking that at time following up on blood pressure.  She reports that she is doing well with no new particular concerns problems or complaints.  Taking medications as directed.    Current medications:  Medications are all noted and reviewed.    Review of systems:  Constitutional:  No fever chills body aches.    Cardiovascular:  No chest pain or palpitations and no syncope.    Respiratory:  No cough shortness of breath.  Psychiatric:  Uses alprazolam p.r.n. for anxiety.    Physical examination:   General: Pleasant alert appropriately groomed lady no acute distress.    Vital signs:  Blood pressure taken manually by this examiner 118/76.    Cardiovascular: Regular rate rhythm.  No significant murmur.    Mental status:  Alert oriented affect mood all appropriate.      Data:   TSH 4.3 which is just slightly lower from her TSH six months ago.        Impression:  Hypertension well controlled.    Mild subclinical hypothyroidism does not meet any criteria for supplementation at this time.    Dyslipidemia on statin therapy.      Mild ascending aorta enlargement followed by Cardiology.          Plan:   Continue current pharmacologic regimens.    Follow-up six months for general health assessment

## 2024-02-05 RX ORDER — CARVEDILOL 25 MG/1
TABLET ORAL
Qty: 180 TABLET | Refills: 3 | Status: SHIPPED | OUTPATIENT
Start: 2024-02-05

## 2024-02-07 ENCOUNTER — TELEPHONE (OUTPATIENT)
Dept: OBSTETRICS AND GYNECOLOGY | Facility: CLINIC | Age: 79
End: 2024-02-07
Payer: MEDICARE

## 2024-02-07 ENCOUNTER — OFFICE VISIT (OUTPATIENT)
Dept: URGENT CARE | Facility: CLINIC | Age: 79
End: 2024-02-07
Payer: MEDICARE

## 2024-02-07 VITALS
TEMPERATURE: 98 F | BODY MASS INDEX: 23.22 KG/M2 | WEIGHT: 136 LBS | OXYGEN SATURATION: 95 % | RESPIRATION RATE: 18 BRPM | HEART RATE: 59 BPM | DIASTOLIC BLOOD PRESSURE: 78 MMHG | SYSTOLIC BLOOD PRESSURE: 122 MMHG | HEIGHT: 64 IN

## 2024-02-07 DIAGNOSIS — N39.0 ACUTE UTI (URINARY TRACT INFECTION): Primary | ICD-10-CM

## 2024-02-07 DIAGNOSIS — R30.0 DYSURIA: ICD-10-CM

## 2024-02-07 LAB
BILIRUB UR QL STRIP: POSITIVE
GLUCOSE UR QL STRIP: POSITIVE
KETONES UR QL STRIP: NEGATIVE
LEUKOCYTE ESTERASE UR QL STRIP: POSITIVE
PH, POC UA: 5 (ref 5–8)
POC BLOOD, URINE: POSITIVE
POC NITRATES, URINE: POSITIVE
PROT UR QL STRIP: POSITIVE
SP GR UR STRIP: 1.02 (ref 1–1.03)
UROBILINOGEN UR STRIP-ACNC: 4 (ref 0.1–1.1)

## 2024-02-07 PROCEDURE — 81003 URINALYSIS AUTO W/O SCOPE: CPT | Mod: QW,S$GLB,,

## 2024-02-07 PROCEDURE — 99214 OFFICE O/P EST MOD 30 MIN: CPT | Mod: S$GLB,,,

## 2024-02-07 RX ORDER — NITROFURANTOIN 25; 75 MG/1; MG/1
100 CAPSULE ORAL 2 TIMES DAILY
Qty: 10 CAPSULE | Refills: 0 | Status: SHIPPED | OUTPATIENT
Start: 2024-02-07 | End: 2024-02-12

## 2024-02-07 NOTE — PATIENT INSTRUCTIONS
- Today your urine test shows that you have a UTI.   - Take pyridium or AZO for your symptoms. Beware, this will turn your urine orange and can turn tears orange.      - Rest.    - Drink plenty of fluids.      - You must understand that you have received an Urgent Care treatment only and that you may be released before all of your medical problems are known or treated.   - You, the patient, will arrange for follow up care as instructed.   - If your condition worsens or fails to improve we recommend that you receive another evaluation at the ER immediately or contact your PCP to discuss your concerns or return here.   - Follow up with your PCP or specialty clinic as directed in the next 1-2 weeks if not improved or as needed.  You can call [phone] to schedule an appointment with the appropriate provider.    If your symptoms do not improve or worsen, go to the emergency room immediately.

## 2024-02-07 NOTE — TELEPHONE ENCOUNTER
----- Message from Lázaro Tolbert sent at 2/7/2024  2:09 PM CST -----  Name of Who is Calling:DANY JONES [[id number]]                What is the request in detail: Pt calling because she have a UTI,I couldn't find her an apt with the walking in and pt is asking to be seen are some medication.Please call back to further assist.        [id number] [address]  AT [facility] [address]         Can the clinic reply by [facility]: No                What Number to Call Back if not in [facility]: [phone]

## 2024-02-07 NOTE — PROGRESS NOTES
"Subjective:      Patient ID: Nesha Landa is a 78 y.o. female.    Vitals:  height is 5' 4" (1.626 m) and weight is 61.7 kg (136 lb). Her oral temperature is 98.3 °F (36.8 °C). Her blood pressure is 122/78 and her pulse is 59 (abnormal). Her respiration is 18 and oxygen saturation is 95%.     Chief Complaint: Dysuria     78 y.o. female who presents increased urgency, burning, and frequency since yesterday. Pt denies any hematuria, fever, N/V/D, or vaginal discharge. She has been trying AZO with significant pain relief. Her last UTI was a year ago.         Dysuria   This is a new problem. The current episode started yesterday. The problem occurs every urination. The problem has been unchanged. The quality of the pain is described as burning. The pain is at a severity of 9/10. The pain is severe. There has been no fever. She is Not sexually active. There is No history of pyelonephritis. Associated symptoms include frequency and urgency. Pertinent negatives include no discharge, flank pain, hematuria, nausea or constipation. Treatments tried: AZO. There is no history of catheterization, kidney stones, recurrent UTIs or STD.       Gastrointestinal:  Negative for nausea and constipation.   Genitourinary:  Positive for dysuria, frequency and urgency. Negative for flank pain and hematuria.      Objective:     Physical Exam   Constitutional: She is oriented to person, place, and time. She appears well-developed.      Comments:Patient sits comfortably in exam chair. Answers questions in complete sentences. Does not show any signs of distress or discoloration.      normal  HENT:   Head: Normocephalic and atraumatic.   Ears:   Right Ear: External ear normal.   Left Ear: External ear normal.   Nose: Nose normal. No nasal deformity. No epistaxis.   Mouth/Throat: Oropharynx is clear and moist and mucous membranes are normal.   Eyes: Lids are normal.   Neck: Trachea normal and phonation normal. Neck supple.   Cardiovascular: Normal " pulses.   Pulmonary/Chest: Effort normal and breath sounds normal. No stridor. No respiratory distress. She has no decreased breath sounds. She has no wheezes. She has no rhonchi. She has no rales. She exhibits no tenderness.   Abdominal: Normal appearance. There is no left CVA tenderness and no right CVA tenderness.   Neurological: She is alert and oriented to person, place, and time.   Skin: Skin is warm, dry and intact.   Psychiatric: Her speech is normal and behavior is normal.   Nursing note and vitals reviewed.    Results for orders placed or performed in visit on 02/07/24   POCT Urinalysis, Dipstick, Automated, W/O Scope   Result Value Ref Range    POC Blood, Urine Positive (A) Negative    POC Bilirubin, Urine Positive (A) Negative    POC Urobilinogen, Urine 4.0 (A) 0.1 - 1.1    POC Ketones, Urine Negative Negative    POC Protein, Urine Positive (A) Negative    POC Nitrates, Urine Positive (A) Negative    POC Glucose, Urine Positive (A) Negative    pH, UA 5.0 5 - 8    POC Specific Gravity, Urine 1.020 1.003 - 1.029    POC Leukocytes, Urine Positive (A) Negative       Assessment:     1. Acute UTI (urinary tract infection)    2. Dysuria        Plan:       Acute UTI (urinary tract infection)  -     nitrofurantoin, macrocrystal-monohydrate, (MACROBID) 100 MG capsule; Take 1 capsule (100 mg total) by mouth 2 (two) times daily. for 5 days  Dispense: 10 capsule; Refill: 0    Dysuria  -     POCT Urinalysis, Dipstick, Automated, W/O Scope                Patient Instructions   - Today your urine test shows that you have a UTI.   - Take pyridium or AZO for your symptoms. Beware, this will turn your urine orange and can turn tears orange.      - Rest.    - Drink plenty of fluids.      - You must understand that you have received an Urgent Care treatment only and that you may be released before all of your medical problems are known or treated.   - You, the patient, will arrange for follow up care as instructed.   - If  your condition worsens or fails to improve we recommend that you receive another evaluation at the ER immediately or contact your PCP to discuss your concerns or return here.   - Follow up with your PCP or specialty clinic as directed in the next 1-2 weeks if not improved or as needed.  You can call [phone] to schedule an appointment with the appropriate provider.    If your symptoms do not improve or worsen, go to the emergency room immediately.

## 2024-02-07 NOTE — PROGRESS NOTES
Subjective:      Patient ID: Nesha Landa is a 78 y.o. female.    Vitals:  vitals were not taken for this visit.     Chief Complaint: Dysuria    This is a 78 y.o. female who presents today with a chief complaint of a possible UTI. Symptoms started last night. Pt is having pain and pressure when urinating.      Dysuria   This is a new problem. The current episode started yesterday. The problem occurs every urination. The problem has been unchanged. The quality of the pain is described as burning. The pain is at a severity of 9/10. The pain is severe. There has been no fever. She is Not sexually active. There is No history of pyelonephritis. Associated symptoms include frequency and urgency. Treatments tried: AZO.     Genitourinary:  Positive for dysuria, frequency and urgency.    Objective:     Physical Exam    Assessment:     No diagnosis found.    Plan:       There are no diagnoses linked to this encounter.

## 2024-02-20 RX ORDER — ALPRAZOLAM 0.25 MG/1
0.25 TABLET ORAL DAILY PRN
Qty: 20 TABLET | Refills: 1 | Status: SHIPPED | OUTPATIENT
Start: 2024-02-20 | End: 2024-05-28 | Stop reason: SDUPTHER

## 2024-02-21 ENCOUNTER — OFFICE VISIT (OUTPATIENT)
Dept: OBSTETRICS AND GYNECOLOGY | Facility: CLINIC | Age: 79
End: 2024-02-21
Payer: MEDICARE

## 2024-02-21 VITALS
SYSTOLIC BLOOD PRESSURE: 136 MMHG | BODY MASS INDEX: 23.6 KG/M2 | HEIGHT: 64 IN | WEIGHT: 138.25 LBS | DIASTOLIC BLOOD PRESSURE: 80 MMHG

## 2024-02-21 DIAGNOSIS — R39.9 URINARY TRACT INFECTION SYMPTOMS: Primary | ICD-10-CM

## 2024-02-21 DIAGNOSIS — Z90.710 HISTORY OF HYSTERECTOMY: ICD-10-CM

## 2024-02-21 DIAGNOSIS — Z78.0 POSTMENOPAUSAL STATUS: ICD-10-CM

## 2024-02-21 PROCEDURE — 87086 URINE CULTURE/COLONY COUNT: CPT | Mod: HCNC | Performed by: OBSTETRICS & GYNECOLOGY

## 2024-02-21 PROCEDURE — 1159F MED LIST DOCD IN RCRD: CPT | Mod: HCNC,CPTII,S$GLB, | Performed by: OBSTETRICS & GYNECOLOGY

## 2024-02-21 PROCEDURE — 3075F SYST BP GE 130 - 139MM HG: CPT | Mod: HCNC,CPTII,S$GLB, | Performed by: OBSTETRICS & GYNECOLOGY

## 2024-02-21 PROCEDURE — 1160F RVW MEDS BY RX/DR IN RCRD: CPT | Mod: HCNC,CPTII,S$GLB, | Performed by: OBSTETRICS & GYNECOLOGY

## 2024-02-21 PROCEDURE — 1101F PT FALLS ASSESS-DOCD LE1/YR: CPT | Mod: HCNC,CPTII,S$GLB, | Performed by: OBSTETRICS & GYNECOLOGY

## 2024-02-21 PROCEDURE — 3288F FALL RISK ASSESSMENT DOCD: CPT | Mod: HCNC,CPTII,S$GLB, | Performed by: OBSTETRICS & GYNECOLOGY

## 2024-02-21 PROCEDURE — 99999 PR PBB SHADOW E&M-EST. PATIENT-LVL IV: CPT | Mod: PBBFAC,HCNC,, | Performed by: OBSTETRICS & GYNECOLOGY

## 2024-02-21 PROCEDURE — 3079F DIAST BP 80-89 MM HG: CPT | Mod: HCNC,CPTII,S$GLB, | Performed by: OBSTETRICS & GYNECOLOGY

## 2024-02-21 PROCEDURE — 99213 OFFICE O/P EST LOW 20 MIN: CPT | Mod: HCNC,S$GLB,, | Performed by: OBSTETRICS & GYNECOLOGY

## 2024-02-21 PROCEDURE — 1126F AMNT PAIN NOTED NONE PRSNT: CPT | Mod: HCNC,CPTII,S$GLB, | Performed by: OBSTETRICS & GYNECOLOGY

## 2024-02-21 RX ORDER — ONDANSETRON 4 MG/1
50 TABLET, FILM COATED ORAL
COMMUNITY
Start: 2023-08-23 | End: 2024-05-14 | Stop reason: SDUPTHER

## 2024-02-21 RX ORDER — VITAMIN E MIXED 400 UNIT
CAPSULE ORAL
COMMUNITY
End: 2024-05-14 | Stop reason: SDUPTHER

## 2024-02-21 RX ORDER — PROMETHAZINE HYDROCHLORIDE 25 MG/1
25 TABLET ORAL DAILY PRN
COMMUNITY
Start: 2023-12-27

## 2024-02-21 RX ORDER — NITROFURANTOIN 25; 75 MG/1; MG/1
100 CAPSULE ORAL 2 TIMES DAILY
Qty: 10 CAPSULE | Refills: 0 | Status: SHIPPED | OUTPATIENT
Start: 2024-02-21 | End: 2024-02-26

## 2024-02-21 NOTE — PROGRESS NOTES
"Chief Complaint   Patient presents with    bladder pressure       HPI:  Nesha Landa is a 78 y.o. female patient  who presents today for evaluation of bladder symptoms.  For the past several days, she describes having urinary urgency, frequency, as well as pelvic pressure.  Denies dysuria.  No fever back pain or fever.  She has had UTIs in the past and feels that her current symptoms are similar.  No LMP recorded. Patient is postmenopausal.    Blood pressure 136/80, height 5' 4" (1.626 m), weight 62.7 kg (138 lb 3.7 oz).    Urine dip: trace blood.    23 Mammogram: Negative, TC .68%    Past Medical History:   Diagnosis Date    Coronary arteriosclerosis     HTN (hypertension)     Hypercholesteremia     LBBB (left bundle branch block)     Osteopenia        Past Surgical History:   Procedure Laterality Date    CATARACT EXTRACTION      10 years+ OU    HYSTERECTOMY      partial colectomy           ROS:  GENERAL: Feeling well overall.   SKIN: Denies rash or lesions.   HEAD: Denies head injury or headache.   NODES: Denies enlarged lymph nodes.   CHEST: Denies chest pain or shortness of breath.   CARDIOVASCULAR: Denies palpitations or left sided chest pain.   ABDOMEN: No abdominal pain, nausea, vomiting or rectal bleeding.   URINARY:  Reports urinary urgency, frequency, and pressure.  No dysuria.  REPRODUCTIVE: See HPI.   BREASTS: Denies pain, lumps, or nipple discharge.   HEMATOLOGIC: No easy bruisability or excessive bleeding.   MUSCULOSKELETAL:  Reports some joint pain.  NEUROLOGIC: Denies syncope or weakness.   PSYCHIATRIC: Denies depression.    PE:   (chaperone present during entire exam)  APPEARANCE: Well nourished, well developed, in no acute distress.  ABDOMEN: Soft.  Mild suprapubic tenderness.  VULVA: No lesions. Normal female genitalia.  URETHRAL MEATUS: Normal size and location, no lesions, no prolapse.  URETHRA: No masses, tenderness, prolapse or scarring.  VAGINA:  Atrophic, no abnormal discharge, " no significant cystocele or rectocele.  CERVIX / UTERUS:  Surgically absent  BME: No masses, bladder base tender.      Diagnosis:  1. Urinary tract infection symptoms    2. Postmenopausal status    3. History of hysterectomy          PLAN:    Orders Placed This Encounter    Urine culture    nitrofurantoin, macrocrystal-monohydrate, (MACROBID) 100 MG capsule       Patient was counseled today on urinary symptoms and exam findings which are consistent with a UTI.  She begin Macrobid and urine will be sent for culture.  She was encouraged to increase her water intake.    Follow-up after testing.    I spent a total of 20 minutes on the day of the visit.This includes face to face time and non-face to face time preparing to see the patient (eg, review of tests), Obtaining and/or reviewing separately obtained history, Documenting clinical information in the electronic or other health record, Independently interpreting resultsand communicating results to the patient/family/caregiver, or Care coordination.      This note was created with voice recognition software.  Grammatical, syntax and spelling errors may be inevitable.

## 2024-02-23 ENCOUNTER — TELEPHONE (OUTPATIENT)
Dept: OBSTETRICS AND GYNECOLOGY | Facility: CLINIC | Age: 79
End: 2024-02-23
Payer: MEDICARE

## 2024-02-23 LAB — BACTERIA UR CULT: NORMAL

## 2024-02-24 NOTE — TELEPHONE ENCOUNTER
"Called patient:    Discuss results of urine culture- negative    She is taking Macrobid and feels that her bladder symptoms are "50% better."    She will finish this medication let us know her progress after the weekend.    If bladder symptoms continue, she will need to be seen by a urologist.  "

## 2024-03-06 DIAGNOSIS — I25.10 CORONARY ARTERY DISEASE DUE TO CALCIFIED CORONARY LESION: Primary | ICD-10-CM

## 2024-03-06 DIAGNOSIS — I25.84 CORONARY ARTERY DISEASE DUE TO CALCIFIED CORONARY LESION: Primary | ICD-10-CM

## 2024-03-19 ENCOUNTER — OFFICE VISIT (OUTPATIENT)
Dept: URGENT CARE | Facility: CLINIC | Age: 79
End: 2024-03-19
Payer: MEDICARE

## 2024-03-19 VITALS
SYSTOLIC BLOOD PRESSURE: 131 MMHG | DIASTOLIC BLOOD PRESSURE: 76 MMHG | WEIGHT: 138.25 LBS | BODY MASS INDEX: 23.73 KG/M2 | HEART RATE: 60 BPM | OXYGEN SATURATION: 96 % | RESPIRATION RATE: 18 BRPM | TEMPERATURE: 98 F

## 2024-03-19 DIAGNOSIS — R35.0 URINARY FREQUENCY: ICD-10-CM

## 2024-03-19 DIAGNOSIS — N39.0 ACUTE UTI (URINARY TRACT INFECTION): Primary | ICD-10-CM

## 2024-03-19 LAB
BILIRUB UR QL STRIP: POSITIVE
GLUCOSE UR QL STRIP: NEGATIVE
KETONES UR QL STRIP: NEGATIVE
LEUKOCYTE ESTERASE UR QL STRIP: NEGATIVE
PH, POC UA: 5.5 (ref 5–8)
POC BLOOD, URINE: NEGATIVE
POC NITRATES, URINE: NEGATIVE
PROT UR QL STRIP: POSITIVE
SP GR UR STRIP: 1.02 (ref 1–1.03)
UROBILINOGEN UR STRIP-ACNC: POSITIVE (ref 0.1–1.1)

## 2024-03-19 PROCEDURE — 87088 URINE BACTERIA CULTURE: CPT

## 2024-03-19 PROCEDURE — 87077 CULTURE AEROBIC IDENTIFY: CPT

## 2024-03-19 PROCEDURE — 87086 URINE CULTURE/COLONY COUNT: CPT

## 2024-03-19 PROCEDURE — 99213 OFFICE O/P EST LOW 20 MIN: CPT | Mod: 25,S$GLB,,

## 2024-03-19 PROCEDURE — 87186 SC STD MICRODIL/AGAR DIL: CPT

## 2024-03-19 PROCEDURE — 81003 URINALYSIS AUTO W/O SCOPE: CPT | Mod: QW,S$GLB,,

## 2024-03-19 PROCEDURE — 96372 THER/PROPH/DIAG INJ SC/IM: CPT | Mod: S$GLB,,,

## 2024-03-19 RX ORDER — CEFTRIAXONE 1 G/1
1 INJECTION, POWDER, FOR SOLUTION INTRAMUSCULAR; INTRAVENOUS
Status: COMPLETED | OUTPATIENT
Start: 2024-03-19 | End: 2024-03-19

## 2024-03-19 RX ORDER — AMOXICILLIN 500 MG/1
500 CAPSULE ORAL 2 TIMES DAILY
COMMUNITY
Start: 2024-02-06 | End: 2024-05-14 | Stop reason: ALTCHOICE

## 2024-03-19 RX ORDER — NITROFURANTOIN 25; 75 MG/1; MG/1
100 CAPSULE ORAL 2 TIMES DAILY
Qty: 14 CAPSULE | Refills: 0 | Status: SHIPPED | OUTPATIENT
Start: 2024-03-19 | End: 2024-03-26

## 2024-03-19 RX ADMIN — CEFTRIAXONE 1 G: 1 INJECTION, POWDER, FOR SOLUTION INTRAMUSCULAR; INTRAVENOUS at 04:03

## 2024-03-19 NOTE — PROGRESS NOTES
Subjective:      Patient ID: Nesha Landa is a 78 y.o. female.    Vitals:  weight is 62.7 kg (138 lb 3.7 oz). Her oral temperature is 98.2 °F (36.8 °C). Her blood pressure is 131/76 and her pulse is 60. Her respiration is 18 and oxygen saturation is 96%.     Chief Complaint: Urinary Frequency and burning urination    78-year-old female with dysuria, frequency, urgency, bladder pressure started today.  Patient states she has history of frequent UTI and has an appointment with Urology for next month.  Patient states she has been taking over-the-counter Azo with mild relief.  Denies CVA tenderness, flank pain, fever, abdominal pain, or any other associated symptoms        Urinary Frequency   This is a new problem. The current episode started in the past 7 days. The problem occurs every urination. The problem has been gradually worsening. The quality of the pain is described as burning. The pain is at a severity of 9/10. The pain is moderate. There has been no fever. She is Not sexually active. There is No history of pyelonephritis. Associated symptoms include frequency and urgency. Pertinent negatives include no chills, flank pain, hematuria, nausea, vomiting, constipation or rash. Treatments tried: azo. The treatment provided mild relief.       Constitution: Negative for activity change, appetite change, chills and sweating.   HENT:  Negative for ear pain, ear discharge, foreign body in ear, tinnitus, hearing loss, dental problem, drooling, sinus pressure, sore throat, trouble swallowing and voice change.    Neck: Negative for neck pain, neck stiffness and painful lymph nodes.   Cardiovascular:  Negative for chest trauma, chest pain and leg swelling.   Eyes:  Negative for eye trauma, foreign body in eye, eye discharge and eye itching.   Respiratory:  Negative for sleep apnea, chest tightness, cough, sputum production, bloody sputum and asthma.    Gastrointestinal:  Negative for abdominal trauma, abdominal pain,  abdominal bloating, history of abdominal surgery, nausea, vomiting, constipation, diarrhea, bright red blood in stool, dark colored stools, rectal bleeding, rectal pain, hemorrhoids, heartburn and bowel incontinence.   Endocrine: hair loss, cold intolerance and heat intolerance.   Genitourinary:  Positive for dysuria, frequency and urgency. Negative for urine decreased, flank pain, bladder incontinence, bed wetting, hematuria and history of kidney stones.   Musculoskeletal:  Negative for pain, trauma, joint pain, joint swelling and abnormal ROM of joint.   Skin:  Negative for color change, pale, rash, wound and abrasion.   Allergic/Immunologic: Negative for environmental allergies, seasonal allergies, food allergies, eczema and asthma.   Neurological:  Negative for dizziness, history of vertigo, light-headedness, passing out, disorientation and altered mental status.   Hematologic/Lymphatic: Negative for swollen lymph nodes, easy bruising/bleeding, trouble clotting and history of blood clots. Does not bruise/bleed easily.   Psychiatric/Behavioral:  Negative for altered mental status, disorientation, confusion, agitation and nervous/anxious. The patient is not nervous/anxious.       Objective:     Physical Exam   Constitutional: She is oriented to person, place, and time. She appears well-developed. She is cooperative. No distress.   HENT:   Head: Normocephalic and atraumatic.   Nose: Nose normal.   Mouth/Throat: Oropharynx is clear and moist and mucous membranes are normal.   Eyes: Conjunctivae and lids are normal.   Neck: Trachea normal and phonation normal. Neck supple.   Cardiovascular: Normal rate, regular rhythm, normal heart sounds and normal pulses.   Pulmonary/Chest: Effort normal and breath sounds normal. She exhibits no tenderness.   Abdominal: Normal appearance and bowel sounds are normal. She exhibits no distension and no mass. Soft. There is no abdominal tenderness. There is no rebound, no guarding, no  left CVA tenderness and no right CVA tenderness. No hernia.   Musculoskeletal:         General: No swelling, tenderness, deformity or signs of injury.      Right lower leg: No edema.      Left lower leg: No edema.   Neurological: She is alert and oriented to person, place, and time. She has normal strength and normal reflexes. No sensory deficit.   Skin: Skin is warm, dry, intact and not diaphoretic.   Psychiatric: Her speech is normal and behavior is normal. Judgment and thought content normal.   Nursing note and vitals reviewed.    Results for orders placed or performed in visit on 03/19/24   POCT Urinalysis, Dipstick, Automated, W/O Scope   Result Value Ref Range    POC Blood, Urine Negative Negative    POC Bilirubin, Urine Positive (A) Negative    POC Urobilinogen, Urine positive 0.1 - 1.1    POC Ketones, Urine Negative Negative    POC Protein, Urine Positive (A) Negative    POC Nitrates, Urine Negative Negative    POC Glucose, Urine Negative Negative    pH, UA 5.5 5 - 8    POC Specific Gravity, Urine 1.020 1.003 - 1.029    POC Leukocytes, Urine Negative Negative        Assessment:     1. Acute UTI (urinary tract infection)    2. Urinary frequency        Plan:       Acute UTI (urinary tract infection)  -     cefTRIAXone injection 1 g  -     nitrofurantoin, macrocrystal-monohydrate, (MACROBID) 100 MG capsule; Take 1 capsule (100 mg total) by mouth 2 (two) times daily. for 7 days  Dispense: 14 capsule; Refill: 0    Urinary frequency  -     POCT Urinalysis, Dipstick, Automated, W/O Scope  -     CULTURE, URINE

## 2024-03-19 NOTE — PATIENT INSTRUCTIONS
Take Macrobid twice a day for 7 days.  We will call you in a few days regarding your urine culture.  Please follow-up with urologist as scheduled.    What care is needed at home?   Call your regular doctor to let them know you were in the ED. Make a follow-up appointment if you were told to.  For the first day or so, you may want to take an over-the-counter medicine, like phenazopyridine. This will help to numb your bladder. You will also not have the strong urge to urinate. This medicine causes your urine and tears to look orange. If you have kidney disease, talk to your doctor before taking this medicine.  To lower your chance of getting a UTI in the future, you can:  Drink extra fluids.  If you have sex, urinate right afterwards.  When do I need to get emergency help?   Return to the ED if:   You have very bad pain in your back, shoulder, or belly.  You have a fever of 102.2°F (39°C); shaking chills or sweats even though you are taking antibiotics.  When do I need to call the doctor?   You have a fever up to 100.4°F (38°C).  You notice more blood in your urine.  Your signs get worse or do not improve within 24 hours of starting treatment.  You are not able to urinate for more than 8 hours  Your signs come back after treatment has stopped.  You have new or worsening symptoms.  - Rest.    - Drink plenty of fluids.    - Acetaminophen (tylenol) or Ibuprofen (advil,motrin) as directed as needed for fever/pain. Avoid tylenol if you have a history of liver disease. Do not take ibuprofen if you have a history of GI bleeding, kidney disease, or if you take blood thinners.     - Follow up with your PCP or specialty clinic as directed in the next 1-2 weeks if not improved or as needed.  You can call [phone] to schedule an appointment with the appropriate provider.    - Go to the ER or seek medical attention immediately if you develop new or worsening symptoms.     - You must understand that you have received an  Urgent Care treatment only and that you may be released before all of your medical problems are known or treated.   - You, the patient, will arrange for follow up care as instructed.   - If your condition worsens or fails to improve we recommend that you receive another evaluation at the ER immediately or contact your PCP to discuss your concerns or return here.

## 2024-03-19 NOTE — PROGRESS NOTES
Subjective:      Patient ID: Nesha Landa is a 78 y.o. female.    Vitals:  vitals were not taken for this visit.     Chief Complaint: Urinary Frequency and burning urination    HPI  ROS   Objective:     Physical Exam    Assessment:     1. Urinary frequency        Plan:       Urinary frequency  -     POCT Urinalysis, Dipstick, Automated, W/O Scope

## 2024-03-19 NOTE — PROGRESS NOTES
Subjective:      Patient ID: Nesha Landa is a 78 y.o. female.    Vitals:  vitals were not taken for this visit.     Chief Complaint: Urinary Frequency    This is a 78 y.o. female   who presents today with a chief complaint of     Urinary Frequency   Associated symptoms include frequency.     Genitourinary:  Positive for frequency.    Objective:     Physical Exam    Assessment:     No diagnosis found.    Plan:       There are no diagnoses linked to this encounter.

## 2024-03-23 ENCOUNTER — TELEPHONE (OUTPATIENT)
Dept: URGENT CARE | Facility: CLINIC | Age: 79
End: 2024-03-23
Payer: MEDICARE

## 2024-03-23 LAB — BACTERIA UR CULT: ABNORMAL

## 2024-03-23 NOTE — TELEPHONE ENCOUNTER
Results for orders placed or performed in visit on 03/19/24   CULTURE, URINE    Specimen: Urine, Clean Catch   Result Value Ref Range    Urine Culture, Routine KLEBSIELLA PNEUMONIAE  >100,000 cfu/ml   (A)        Susceptibility    Klebsiella pneumoniae - CULTURE, URINE     Amp/Sulbactam >16/8 Resistant mcg/mL     Amox/K Clav'ate 16/8 Intermediate mcg/mL     Ceftriaxone <=1 Sensitive mcg/mL     Cefazolin 16 Intermediate mcg/mL     Ciprofloxacin <=1 Sensitive mcg/mL     Cefepime <=2 Sensitive mcg/mL     Ertapenem <=0.5 Sensitive mcg/mL     Nitrofurantoin 64 Intermediate mcg/mL     Gentamicin <=4 Sensitive mcg/mL     Levofloxacin <=2 Sensitive mcg/mL     Meropenem <=1 Sensitive mcg/mL     Piperacillin/Tazo >64 Resistant mcg/mL     Trimeth/Sulfa <=2/38 Sensitive mcg/mL     Tobramycin <=4 Sensitive mcg/mL   POCT Urinalysis, Dipstick, Automated, W/O Scope   Result Value Ref Range    POC Blood, Urine Negative Negative    POC Bilirubin, Urine Positive (A) Negative    POC Urobilinogen, Urine positive 0.1 - 1.1    POC Ketones, Urine Negative Negative    POC Protein, Urine Positive (A) Negative    POC Nitrates, Urine Negative Negative    POC Glucose, Urine Negative Negative    pH, UA 5.5 5 - 8    POC Specific Gravity, Urine 1.020 1.003 - 1.029    POC Leukocytes, Urine Negative Negative        44 mL/min  Creatinine clearance, original Cockcroft-Gault    Patient returned call to clinic, advised on intermediate culture with Macrobid, she reports her UTI symptoms have resolved, she was also treated with Rocephin in clinic.  She also reports that yesterday she had sinus symptoms for 1 day and received Augmentin from her ENT.  So she is currently taking Augmentin.  Advised to stop the Macrobid, her UTI symptoms have resolved.  She has a follow-up with her urologist on April 3rd.  Advised to follow up with Urology with any questions or concerns she agreed with plan.

## 2024-05-07 ENCOUNTER — LAB VISIT (OUTPATIENT)
Dept: LAB | Facility: HOSPITAL | Age: 79
End: 2024-05-07
Payer: MEDICARE

## 2024-05-07 DIAGNOSIS — I25.10 CORONARY ARTERY DISEASE DUE TO CALCIFIED CORONARY LESION: ICD-10-CM

## 2024-05-07 DIAGNOSIS — I25.84 CORONARY ARTERY DISEASE DUE TO CALCIFIED CORONARY LESION: ICD-10-CM

## 2024-05-07 LAB
ALBUMIN SERPL BCP-MCNC: 3.5 G/DL (ref 3.5–5.2)
ALP SERPL-CCNC: 58 U/L (ref 55–135)
ALT SERPL W/O P-5'-P-CCNC: 16 U/L (ref 10–44)
ANION GAP SERPL CALC-SCNC: 6 MMOL/L (ref 8–16)
AST SERPL-CCNC: 24 U/L (ref 10–40)
BILIRUB SERPL-MCNC: 0.5 MG/DL (ref 0.1–1)
BUN SERPL-MCNC: 11 MG/DL (ref 8–23)
CALCIUM SERPL-MCNC: 9.7 MG/DL (ref 8.7–10.5)
CHLORIDE SERPL-SCNC: 103 MMOL/L (ref 95–110)
CHOLEST SERPL-MCNC: 165 MG/DL (ref 120–199)
CHOLEST/HDLC SERPL: 2.1 {RATIO} (ref 2–5)
CO2 SERPL-SCNC: 28 MMOL/L (ref 23–29)
CREAT SERPL-MCNC: 0.9 MG/DL (ref 0.5–1.4)
EST. GFR  (NO RACE VARIABLE): >60 ML/MIN/1.73 M^2
GLUCOSE SERPL-MCNC: 109 MG/DL (ref 70–110)
HDLC SERPL-MCNC: 77 MG/DL (ref 40–75)
HDLC SERPL: 46.7 % (ref 20–50)
LDLC SERPL CALC-MCNC: 75.6 MG/DL (ref 63–159)
NONHDLC SERPL-MCNC: 88 MG/DL
POTASSIUM SERPL-SCNC: 4.1 MMOL/L (ref 3.5–5.1)
PROT SERPL-MCNC: 6.4 G/DL (ref 6–8.4)
SODIUM SERPL-SCNC: 137 MMOL/L (ref 136–145)
TRIGL SERPL-MCNC: 62 MG/DL (ref 30–150)

## 2024-05-07 PROCEDURE — 36415 COLL VENOUS BLD VENIPUNCTURE: CPT | Mod: HCNC,PO | Performed by: INTERNAL MEDICINE

## 2024-05-07 PROCEDURE — 80061 LIPID PANEL: CPT | Mod: HCNC | Performed by: INTERNAL MEDICINE

## 2024-05-07 PROCEDURE — 80053 COMPREHEN METABOLIC PANEL: CPT | Mod: HCNC | Performed by: INTERNAL MEDICINE

## 2024-05-08 ENCOUNTER — OFFICE VISIT (OUTPATIENT)
Dept: PODIATRY | Facility: CLINIC | Age: 79
End: 2024-05-08
Payer: MEDICARE

## 2024-05-08 VITALS
DIASTOLIC BLOOD PRESSURE: 77 MMHG | TEMPERATURE: 100 F | HEIGHT: 64 IN | WEIGHT: 138.25 LBS | BODY MASS INDEX: 23.6 KG/M2 | HEART RATE: 78 BPM | SYSTOLIC BLOOD PRESSURE: 130 MMHG

## 2024-05-08 DIAGNOSIS — M79.675 PAIN OF TOES OF BOTH FEET: ICD-10-CM

## 2024-05-08 DIAGNOSIS — M79.674 PAIN OF TOES OF BOTH FEET: ICD-10-CM

## 2024-05-08 DIAGNOSIS — L60.0 INGROWN NAIL: Primary | ICD-10-CM

## 2024-05-08 PROCEDURE — 11750 EXCISION NAIL&NAIL MATRIX: CPT | Mod: T5,51,S$GLB, | Performed by: STUDENT IN AN ORGANIZED HEALTH CARE EDUCATION/TRAINING PROGRAM

## 2024-05-08 PROCEDURE — 3078F DIAST BP <80 MM HG: CPT | Mod: CPTII,S$GLB,, | Performed by: STUDENT IN AN ORGANIZED HEALTH CARE EDUCATION/TRAINING PROGRAM

## 2024-05-08 PROCEDURE — 3288F FALL RISK ASSESSMENT DOCD: CPT | Mod: CPTII,S$GLB,, | Performed by: STUDENT IN AN ORGANIZED HEALTH CARE EDUCATION/TRAINING PROGRAM

## 2024-05-08 PROCEDURE — 1125F AMNT PAIN NOTED PAIN PRSNT: CPT | Mod: CPTII,S$GLB,, | Performed by: STUDENT IN AN ORGANIZED HEALTH CARE EDUCATION/TRAINING PROGRAM

## 2024-05-08 PROCEDURE — 3075F SYST BP GE 130 - 139MM HG: CPT | Mod: CPTII,S$GLB,, | Performed by: STUDENT IN AN ORGANIZED HEALTH CARE EDUCATION/TRAINING PROGRAM

## 2024-05-08 PROCEDURE — 99999 PR PBB SHADOW E&M-EST. PATIENT-LVL V: CPT | Mod: PBBFAC,,, | Performed by: STUDENT IN AN ORGANIZED HEALTH CARE EDUCATION/TRAINING PROGRAM

## 2024-05-08 PROCEDURE — 99203 OFFICE O/P NEW LOW 30 MIN: CPT | Mod: 25,S$GLB,, | Performed by: STUDENT IN AN ORGANIZED HEALTH CARE EDUCATION/TRAINING PROGRAM

## 2024-05-08 PROCEDURE — 1101F PT FALLS ASSESS-DOCD LE1/YR: CPT | Mod: CPTII,S$GLB,, | Performed by: STUDENT IN AN ORGANIZED HEALTH CARE EDUCATION/TRAINING PROGRAM

## 2024-05-08 PROCEDURE — 1159F MED LIST DOCD IN RCRD: CPT | Mod: CPTII,S$GLB,, | Performed by: STUDENT IN AN ORGANIZED HEALTH CARE EDUCATION/TRAINING PROGRAM

## 2024-05-08 RX ORDER — TRAMADOL HYDROCHLORIDE 50 MG/1
50 TABLET ORAL EVERY 6 HOURS PRN
Qty: 28 TABLET | Refills: 0 | Status: SHIPPED | OUTPATIENT
Start: 2024-05-08 | End: 2024-06-18

## 2024-05-08 RX ORDER — DEXAMETHASONE 4 MG/1
4 TABLET ORAL DAILY
Qty: 7 TABLET | Refills: 0 | Status: SHIPPED | OUTPATIENT
Start: 2024-05-08

## 2024-05-08 NOTE — PROGRESS NOTES
Subjective:     Patient    Nesha Landa is a 78 y.o. female.    Problem    New to [facility]. Presents for ingrown toenails at bilateral 1st toenail medial borders. Gets pedicures for this usually but they recommended she have the ingrowns removed permanently. Feels better in open toe shoes. No other concerns.      History    History obtained from patient and review of medical records.     Past Medical History:   Diagnosis Date    Coronary arteriosclerosis     HTN (hypertension)     Hypercholesteremia     LBBB (left bundle branch block)     Osteopenia        Past Surgical History:   Procedure Laterality Date    CATARACT EXTRACTION      10 years+ OU    HYSTERECTOMY      partial colectomy          Objective:     Vitals  Wt Readings from Last 1 Encounters:   05/08/24 62.7 kg (138 lb 3.7 oz)     Temp Readings from Last 1 Encounters:   05/08/24 99.6 °F (37.6 °C) (Oral)     BP Readings from Last 1 Encounters:   05/08/24 130/77     Pulse Readings from Last 1 Encounters:   05/08/24 78       Dermatological Exam    Skin:  Pedal hair growth, skin color, and skin texture normal on right  Pedal hair growth, skin color, and skin texture normal on left    Nails:  All nails normal in length, thickness, color except ingrown nails at both 1st toenail medial borders, painful    Vascular Exam    Arteries:  Posterior tibial artery palpable on right  Dorsalis pedis artery palpable on right  Posterior tibial artery palpable on left  Dorsalis pedis artery palpable on left    Veins:  Superficial veins unremarkable on right  Superficial veins unremarkable on left    Swelling:  None on right  None on left    Neurological Exam    [place] touch test:  6/6 sites sensed, light touch intact     Musculoskeletal Exam    Footwear:  Sandal on right  Sandal on left    Gait Exam:   Ambulatory Status: Ambulatory  Gait: Normal  Assistive Devices: None    Foot Progression Angle:  Normal on right  Normal on left    Right Lower Extremity  Additional Findings:  Right foot and ankle function, strength, and range of motion unremarkable except as noted above.     Left Lower Extremity Additional Findings:  Left foot and ankle function, strength, and range of motion unremarkable except as noted above.    Imaging and Other Tests    Imaging:  Independently reviewed and interpreted imaging, findings are as follows: N/A     Assessment:     Encounter Diagnoses   Name Primary?    Ingrown nail Yes    Pain of toes of both feet         Plan:     I counseled the patient on her conditions, their implications and medical management.    Bilateral 1st toenail medial borders ingrown, painful: chronic exacerbated  -Recommended bilateral 1st toenail medial border permanent avulsions. Reviewed potential risks, benefits, alternatives. Patient amenable. Performed, see procedure note. Post-procedure instructions provided. Physical activity as tolerated.      Return to clinic in 2 weeks, call sooner PRN.

## 2024-05-08 NOTE — PROCEDURES
Nail Removal    Date/Time: 5/8/2024 2:00 PM    Performed by: Tomer Perez DPM  Authorized by: Tomer Perez DPM    Consent Done?:  Yes (Verbal)  Time out: Immediately prior to the procedure a time out was called    Location:     Location:  Right foot    Location detail:  Right big toe  Anesthesia:     Anesthesia:  Digital block    Local anesthetic:  Lidocaine 1% without epinephrine    Anesthetic total (ml):  5  Procedure Details:     Preparation:  Skin prepped with alcohol and skin prepped with Betadine    Amount removed:  Partial    Side:  Medial    Wedge excision of skin of nail fold: No      Nail bed sutured?: No      Nail matrix removed:  Partial    Removal method:  Phenol and alcohol    Dressing applied:  Antibiotic ointment, dressing applied and Xeroform gauze    Patient tolerance:  Patient tolerated the procedure well with no immediate complications  Nail Removal    Date/Time: 5/8/2024 2:00 PM    Performed by: Tomer Perez DPM  Authorized by: Tomer Perez DPM    Consent Done?:  Yes (Verbal)  Time out: Immediately prior to the procedure a time out was called    Location:     Location:  Left foot    Location detail:  Left big toe  Anesthesia:     Anesthesia:  Digital block    Local anesthetic:  Lidocaine 1% without epinephrine    Anesthetic total (ml):  5  Procedure Details:     Preparation:  Skin prepped with alcohol and skin prepped with Betadine    Amount removed:  Partial    Side:  Medial    Wedge excision of skin of nail fold: No      Nail bed sutured?: No      Nail matrix removed:  Partial    Removal method:  Phenol and alcohol    Dressing applied:  Antibiotic ointment, dressing applied and Xeroform gauze    Patient tolerance:  Patient tolerated the procedure well with no immediate complications

## 2024-05-09 ENCOUNTER — OFFICE VISIT (OUTPATIENT)
Dept: URGENT CARE | Facility: CLINIC | Age: 79
End: 2024-05-09
Payer: MEDICARE

## 2024-05-09 ENCOUNTER — TELEPHONE (OUTPATIENT)
Dept: PODIATRY | Facility: CLINIC | Age: 79
End: 2024-05-09
Payer: MEDICARE

## 2024-05-09 VITALS
RESPIRATION RATE: 18 BRPM | SYSTOLIC BLOOD PRESSURE: 118 MMHG | HEIGHT: 64 IN | WEIGHT: 138 LBS | DIASTOLIC BLOOD PRESSURE: 76 MMHG | BODY MASS INDEX: 23.56 KG/M2 | TEMPERATURE: 97 F | OXYGEN SATURATION: 96 % | HEART RATE: 70 BPM

## 2024-05-09 DIAGNOSIS — L60.0 INGROWN TOENAIL: Primary | ICD-10-CM

## 2024-05-09 PROCEDURE — 99212 OFFICE O/P EST SF 10 MIN: CPT | Mod: S$GLB,,, | Performed by: FAMILY MEDICINE

## 2024-05-09 NOTE — TELEPHONE ENCOUNTER
Pt contacted office stating that Dr. Perez performed bilateral ingrown removals on her great toes and that he left the Tourniquet  on her and that she was in excruciating pain and that the tourniquets were so tight that she was unable to cut them.  I explained to the patient that Dr. Perez after performing ingrown nail removals he dresses the toes with coban, Pt stated that it was extremely tight and that she was in too much pain.  I attempted to contact Dr. Perez but he is currently in surgery and unable to answer.  I followed up with the patient and advised her that if her pain was unbearable she should go to the ER or urgent care.  Pt advised me that she would got to [facility] to have the dressings removed and requested that I contact them to let them know of the situation and that she was on her way.  I contacted [facility] as requested by patient and advised.  Urgent care call center was advised and verbalized comprehension

## 2024-05-09 NOTE — PROGRESS NOTES
"Subjective:      Patient ID: Nesha Landa is a 78 y.o. female.    Vitals:  height is 5' 4.02" (1.626 m) and weight is 62.6 kg (138 lb). Her tympanic temperature is 97.4 °F (36.3 °C). Her blood pressure is 118/76 and her pulse is 70. Her respiration is 18 and oxygen saturation is 96%.     Chief Complaint: Dressing Change    This is a 78 y.o. female who presents today with a chief complaint of bilateral bandage removal. Patient had bilateral toe nails removed yesterday and she was told she could take the bandage off today. Patient was having an issue taking them off and her providers office Dr. Cherry consulted with patient and told her to come to Urgent Care to have bandages removed.     Dressing Change  This is a new problem. The current episode started yesterday. The problem has been unchanged. Nothing aggravates the symptoms. She has tried nothing for the symptoms.     ROS   Objective:     Physical Exam   Constitutional: She is oriented to person, place, and time. She appears well-developed. She is cooperative.   HENT:   Head: Normocephalic and atraumatic.   Ears:   Right Ear: Hearing, tympanic membrane and external ear normal.   Left Ear: Hearing, tympanic membrane and external ear normal.   Nose: Nose normal. No mucosal edema or nasal deformity. No epistaxis. Right sinus exhibits no maxillary sinus tenderness and no frontal sinus tenderness. Left sinus exhibits no maxillary sinus tenderness and no frontal sinus tenderness.   Mouth/Throat: Uvula is midline, oropharynx is clear and moist and mucous membranes are normal. No trismus in the jaw. Normal dentition. No uvula swelling.   Eyes: Conjunctivae and lids are normal.   Neck: Trachea normal and phonation normal. Neck supple.   Cardiovascular: Normal rate, regular rhythm, normal heart sounds and normal pulses.   Pulmonary/Chest: Effort normal and breath sounds normal.   Abdominal: Normal appearance and bowel sounds are normal. Soft.   Musculoskeletal: Normal range " of motion.         General: Normal range of motion.        Feet:    Neurological: She is alert and oriented to person, place, and time. She exhibits normal muscle tone.   Skin: Skin is warm, dry and intact.   Psychiatric: Her speech is normal and behavior is normal. Judgment and thought content normal.   Nursing note and vitals reviewed.      Assessment:     1. Ingrown toenail        Plan:       Ingrown toenail  -Removal of bandages and then reapplied to be more comfortable. Pt to follow up with podiatry         Medical Decision Making:   Initial Assessment:   Pt reports that the bandages where causing her tremendous pain and making her feet throb         Thank you for choosing [facility]!     Our goal in the Urgent Care is to always provide outstanding medical care. You may receive a survey by mail or e-mail in the next week regarding your experience today. We would greatly appreciate you completing and returning the survey. Your feedback provides us with a way to recognize our staff who provide very good care, and it helps us learn how to improve when your experience was below our aspiration of excellence.       We appreciate you trusting us with your medical care. We hope you feel better soon. We will be happy to take care of you for all of your future medical needs.  You must understand that you've received an Urgent Care treatment only and that you may be released before all your medical problems are known or treated. You, the patient, will arrange for follow up care as instructed.  Follow up with your PCP or specialty clinic as directed in the next 1-2 weeks if not improved or as needed.  You can call [phone] to schedule an appointment with the appropriate provider.  Another option is to [facility] ([email or web address]) virtually for quick simple medical advice.  If your condition worsens we recommend that you receive another  evaluation at the emergency room immediately or contact your primary medical clinics after hours call service to discuss your concerns.  Please return here or go to the Emergency Department for any concerns or worsening of condition.      *If you were prescribed a narcotic or controlled medication, do not drive or operate heavy equipment or machinery while taking these medications.

## 2024-05-10 ENCOUNTER — TELEPHONE (OUTPATIENT)
Dept: PODIATRY | Facility: CLINIC | Age: 79
End: 2024-05-10
Payer: MEDICARE

## 2024-05-10 NOTE — TELEPHONE ENCOUNTER
Contacted pt to get an update in regards to her urgent care visit.  Pt stated that the throbbing in her bilateral great toes stopped as soon as the bandages were removed.  Pt was advised to contact our offices if she needed further assistance or with any questions or concerns.  Pt verbalized comprehension .

## 2024-05-14 ENCOUNTER — OFFICE VISIT (OUTPATIENT)
Dept: CARDIOLOGY | Facility: CLINIC | Age: 79
End: 2024-05-14
Payer: MEDICARE

## 2024-05-14 VITALS
HEIGHT: 64 IN | BODY MASS INDEX: 23.26 KG/M2 | WEIGHT: 136.25 LBS | HEART RATE: 78 BPM | SYSTOLIC BLOOD PRESSURE: 123 MMHG | DIASTOLIC BLOOD PRESSURE: 67 MMHG

## 2024-05-14 DIAGNOSIS — I77.810 ASCENDING AORTA DILATION: ICD-10-CM

## 2024-05-14 DIAGNOSIS — I44.7 LBBB (LEFT BUNDLE BRANCH BLOCK): ICD-10-CM

## 2024-05-14 DIAGNOSIS — I25.84 CORONARY ARTERY DISEASE DUE TO CALCIFIED CORONARY LESION: Primary | ICD-10-CM

## 2024-05-14 DIAGNOSIS — I25.10 CORONARY ARTERY DISEASE DUE TO CALCIFIED CORONARY LESION: Primary | ICD-10-CM

## 2024-05-14 DIAGNOSIS — I42.9 CARDIOMYOPATHY, UNSPECIFIED TYPE: ICD-10-CM

## 2024-05-14 DIAGNOSIS — I10 ESSENTIAL HYPERTENSION: ICD-10-CM

## 2024-05-14 LAB
OHS QRS DURATION: 150 MS
OHS QTC CALCULATION: 492 MS

## 2024-05-14 PROCEDURE — 3078F DIAST BP <80 MM HG: CPT | Mod: CPTII,S$GLB,, | Performed by: PHYSICIAN ASSISTANT

## 2024-05-14 PROCEDURE — 99999 PR PBB SHADOW E&M-EST. PATIENT-LVL V: CPT | Mod: PBBFAC,HCNC,, | Performed by: PHYSICIAN ASSISTANT

## 2024-05-14 PROCEDURE — 3288F FALL RISK ASSESSMENT DOCD: CPT | Mod: CPTII,S$GLB,, | Performed by: PHYSICIAN ASSISTANT

## 2024-05-14 PROCEDURE — 1159F MED LIST DOCD IN RCRD: CPT | Mod: CPTII,S$GLB,, | Performed by: PHYSICIAN ASSISTANT

## 2024-05-14 PROCEDURE — 99214 OFFICE O/P EST MOD 30 MIN: CPT | Mod: S$GLB,,, | Performed by: PHYSICIAN ASSISTANT

## 2024-05-14 PROCEDURE — 3074F SYST BP LT 130 MM HG: CPT | Mod: CPTII,S$GLB,, | Performed by: PHYSICIAN ASSISTANT

## 2024-05-14 PROCEDURE — 93000 ELECTROCARDIOGRAM COMPLETE: CPT | Mod: S$GLB,,, | Performed by: INTERNAL MEDICINE

## 2024-05-14 PROCEDURE — 1101F PT FALLS ASSESS-DOCD LE1/YR: CPT | Mod: CPTII,S$GLB,, | Performed by: PHYSICIAN ASSISTANT

## 2024-05-14 PROCEDURE — 1126F AMNT PAIN NOTED NONE PRSNT: CPT | Mod: CPTII,S$GLB,, | Performed by: PHYSICIAN ASSISTANT

## 2024-05-14 NOTE — PROGRESS NOTES
Cardiology Clinic Note  Reason for Visit: Annual visit, Enlarged aortic root     HPI:     Problem List:  CACS >600   Hyperlipidemia   Hypertension  LBBB since at least 2006  Cardiomyopathy - resolved  Mildly enlarged aortic root and ascending aorta      Nesha Landa is a 78 y.o. F, who presents for routine follow up. She denies any acute complaints today.  She remains active by walking her dog daily. She has a 4 year old bichon/brendan tzu mix.  Her last LDL is slightly above goal.  She attributes this to some mild dietary indiscretions.  Generally speaking she tries to eat lean proteins and lots of vegetables.  She admits to having a sweet tooth, enjoys cake, but plans to try to eat more fruit instead in the future.  Her blood pressure is well-controlled in clinic and at home.  She is monitored for a mild enlargement of her aortic root and ascending aorta.     Echocardiogram 12/2022:  Aortic root at the sinuses of Valsalva 4.1 cm (also 4.1 cm in 2016) and ascending aorta 3.8 cm (3.6 cm in 2016).     ROS:    Pertinent ROS included in HPI and below.  PMH:     Past Medical History:   Diagnosis Date    Coronary arteriosclerosis     HTN (hypertension)     Hypercholesteremia     LBBB (left bundle branch block)     Osteopenia      Past Surgical History:   Procedure Laterality Date    CATARACT EXTRACTION      10 years+ OU    HYSTERECTOMY      partial colectomy       Allergies:     Review of patient's allergies indicates:   Allergen Reactions    Ciprofloxacin Nausea Only    Sulfa (sulfonamide antibiotics)     Metronidazole Nausea Only and Nausea And Vomiting     Medications:     Current Outpatient Medications on File Prior to Visit   Medication Sig Dispense Refill    ALPRAZolam (XANAX) 0.25 MG tablet Take 1 tablet (0.25 mg total) by mouth daily as needed for Anxiety. 20 tablet 1    amLODIPine (NORVASC) 5 MG tablet TAKE 1 TABLET EVERY DAY 90 tablet 3    ascorbic acid/collagen hydr (COLLAGEN PLUS VITAMIN C ORAL) Take 3  tablets by mouth once daily.      aspirin (ECOTRIN) 81 MG EC tablet Take 81 mg by mouth once daily.      BIOTIN ORAL Take 1 tablet by mouth once daily.      butalbital-acetaminophen-caffeine -40 mg (FIORICET, ESGIC) -40 mg per tablet Take 1 tablet by mouth as needed.  0    calcium carbonate-vitamin D3 600-125 mg-unit Tab Take 1 tablet by mouth once daily.      carvediloL (COREG) 25 MG tablet TAKE 1 TABLET TWICE DAILY 180 tablet 3    cholecalciferol, vitamin D3, 1,000 unit capsule Take 1 capsule by mouth Daily.        coenzyme Q10 200 mg capsule Take 200 mg by mouth once daily.      cyanocobalamin, vitamin B-12, 50 mcg tablet Take 1 tablet by mouth Daily.        cyclobenzaprine (FLEXERIL) 10 MG tablet TAKE 1 TABLET BY MOUTH EVERY 8 TO 12 HOURS AS NEEDED FOR MUSCLE SPASMS      dexAMETHasone (DECADRON) 4 MG Tab Take 1 tablet (4 mg total) by mouth once daily. 7 tablet 0    ELDERBERRY FRUIT ORAL Take 5 mLs by mouth 2 (two) times daily.      esomeprazole magnesium (NEXIUM ORAL) Take 1 capsule by mouth once daily.      fish oil-omega-3 fatty acids 300-1,000 mg capsule Take 1 g by mouth once daily.      fluticasone (FLONASE) 50 mcg/actuation nasal spray 1 spray by Each Nostril route as needed.  6    folic acid (FOLVITE) 400 MCG tablet Take 400 mcg by mouth once daily.      LACTOBACILLUS ACIDOPHILUS (ACIDOPHILUS ORAL) Take 1 capsule by mouth once daily.      lisinopriL (PRINIVIL,ZESTRIL) 40 MG tablet Take 40 mg by mouth once daily.      meclizine (ANTIVERT) 12.5 mg tablet TAKE ONE TABLET BY MOUTH EVERY 8 HOURS AS NEEDED FOR dizziness      montelukast (SINGULAIR) 10 mg tablet Take 10 mg by mouth every evening.      multivit with minerals/lutein (MULTIVITAMIN 50 PLUS ORAL) Take 1 tablet by mouth once daily.      nystatin (MYCOSTATIN) ointment PLEASE SEE ATTACHED FOR DETAILED DIRECTIONS      ondansetron (ZOFRAN-ODT) 4 MG TbDL Take 4 mg by mouth as needed.       polyethylene glycol (GLYCOLAX) 17 gram/dose powder Take  "by mouth as needed. Pt taking 1 capful prn.      promethazine (PHENERGAN) 25 MG tablet Take 25 mg by mouth daily as needed.      rosuvastatin (CRESTOR) 40 MG Tab Take 1 tablet (40 mg total) by mouth once daily. 90 tablet 3    SIMETHICONE (GAS-X ORAL) Take 1 tablet by mouth as needed.      traMADoL (ULTRAM) 50 mg tablet Take 1 tablet (50 mg total) by mouth every 6 (six) hours as needed for Pain. 28 tablet 0    tretinoin (RETIN-A) 0.1 % cream Apply topically nightly.      valsartan (DIOVAN) 320 MG tablet TAKE 1 TABLET EVERY DAY 90 tablet 3    vitamin E 400 UNIT capsule Take 400 mg by mouth once daily.      [DISCONTINUED] amoxicillin (AMOXIL) 500 MG capsule Take 500 mg by mouth 2 (two) times daily.      [DISCONTINUED] ondansetron (ZOFRAN) 4 MG tablet 50 tablets.      [DISCONTINUED] vitamin E, dl, acetate, 90 mg (200 unit) Cap 0       No current facility-administered medications on file prior to visit.     Social History:     Social History     Tobacco Use    Smoking status: Never    Smokeless tobacco: Never   Substance Use Topics    Alcohol use: No     Family History:     Family History   Problem Relation Name Age of Onset    Coronary artery disease Father          MI -     Heart disease Father      Colon cancer Maternal Grandmother      Diabetes Maternal Grandmother      Gallbladder disease Mother      Heart disease Mother      Hyperlipidemia Sister tung     Diabetes Daughter      No Known Problems Son      No Known Problems Sister      COPD Daughter      Diabetes Daughter      Drug abuse Daughter      No Known Problems Son      No Known Problems Son      Amblyopia Neg Hx      Blindness Neg Hx      Cancer Neg Hx      Cataracts Neg Hx      Glaucoma Neg Hx      Hypertension Neg Hx      Macular degeneration Neg Hx      Retinal detachment Neg Hx      Strabismus Neg Hx      Stroke Neg Hx      Thyroid disease Neg Hx       Physical Exam:   /67   Pulse 78   Ht 5' 4" (1.626 m)   Wt 61.8 kg (136 lb 3.9 oz)   " BMI 23.39 kg/m²      Physical Exam  Vitals and nursing note reviewed.   Constitutional:       Appearance: Normal appearance.   HENT:      Head: Normocephalic and atraumatic.   Neck:      Vascular: Normal carotid pulses. No carotid bruit or hepatojugular reflux.   Cardiovascular:      Rate and Rhythm: Normal rate and regular rhythm.      Chest Wall: PMI is not displaced.      Pulses:           Radial pulses are 2+ on the right side and 2+ on the left side.        Dorsalis pedis pulses are 2+ on the right side and 2+ on the left side.        Posterior tibial pulses are 2+ on the right side and 2+ on the left side.      Heart sounds: No murmur heard.  Pulmonary:      Effort: Pulmonary effort is normal.      Breath sounds: Normal breath sounds. No wheezing, rhonchi or rales.   Abdominal:      General: Bowel sounds are normal. There is no abdominal bruit.      Palpations: Abdomen is soft. There is no pulsatile mass.      Tenderness: There is no abdominal tenderness.   Feet:      Right foot:      Skin integrity: Skin integrity normal.      Left foot:      Skin integrity: Skin integrity normal.   Skin:     Capillary Refill: Capillary refill takes less than 2 seconds.   Neurological:      General: No focal deficit present.      Mental Status: She is alert.   Psychiatric:         Mood and Affect: Mood and affect normal.         Speech: Speech normal.         Behavior: Behavior is cooperative.         Thought Content: Thought content normal.          Labs:     Blood Tests:  Lab Results   Component Value Date    BNP 30 12/03/2014     05/07/2024    K 4.1 05/07/2024     05/07/2024    CO2 28 05/07/2024    BUN 11 05/07/2024    CREATININE 0.9 05/07/2024     05/07/2024    HGBA1C 5.3 06/09/2023    MG 2.2 06/23/2006    AST 24 05/07/2024    ALT 16 05/07/2024    ALBUMIN 3.5 05/07/2024    PROT 6.4 05/07/2024    BILITOT 0.5 05/07/2024    WBC 4.45 06/09/2023    HGB 12.2 06/09/2023    HCT 39.1 06/09/2023     (H)  2023     2023    INR 1.1 2005    TSH 4.366 (H) 2023       Lab Results   Component Value Date    CHOL 165 2024    HDL 77 (H) 2024    TRIG 62 2024       Lab Results   Component Value Date    LDLCALC 75.6 2024         Imaging:     Echocardiogram  TTE 2022  The left ventricle is normal in size with normal systolic function.  The estimated ejection fraction is 65%.  The quantitatively derived ejection fraction is 68%.  Normal left ventricular diastolic function.  Normal right ventricular size with normal right ventricular systolic function.  Normal central venous pressure (3 mmHg).  The estimated PA systolic pressure is 24 mmHg.  The aortic root at the sinuses of Valsalva is mildly dilated measuring 4.1 cm.  The ascending aorta is mildly dilated measuring 3.8 cm.    Stress testing  None    Cath Lab  None    Other  None    EK2024  NSR  Left axis deviation  LBBB    Assessment:     1. Coronary artery disease due to calcified coronary lesion    2. Ascending aorta dilation    3. LBBB (left bundle branch block)    4. Cardiomyopathy, unspecified type - resolved    5. Essential hypertension        Plan:     Coronary artery disease due to calcified coronary lesion  Asymptomatic, continue to monitor  Continue aspirin and statin    Ascending aorta dilation  Mildly enlarged aortic root and ascending aorta.  No significant change since 2016. Moving forward we will consider echo every 2-3 years.   Repeat echo following today's visit    LBBB (left bundle branch block)    Cardiomyopathy, unspecified type - resolved  Euvolemic and asymptomatic.  Continue to monitor.    Essential hypertension  Continue carvedilol and amlodipine.   Well-controlled in clinic and at home  Recommend low-sodium diet and monitoring at home      Signed:  Debbie Boateng PA-C  Cardiology     2024 9:00 AM    Follow-up:     Future Appointments   Date Time Provider Department Center    5/21/2024 11:15 AM E[facility], [facility] [place]   5/24/2024  3:30 PM Tomer Perez, [facility] [place]   6/18/2024 11:00 AM Jasmina Rush MD [facility] [place]   6/25/2024  1:00 PM Jerzy Messina, OD [facility] [place]

## 2024-05-14 NOTE — PATIENT INSTRUCTIONS
Fish oil in the form of a purified EPA known as icosa pent ethyl or a combination of high dose Omega 3 containing EPA and DHA have been shown to be beneficial. If prescription Vascepa, Lovaza or their generic equivalent are not covered by your insurance company then look for an over the counter fish oil that has about 1000 mg of EPA+DHA. Take 1000 mg twice a day.        Alex's Triple Strength Omega 3  Each capsule contains:  Fish Oil 1400 mg  900 mg of Omega 3 =  mg +  mg   Take 1 cap twice a day or 2 caps once a day  Stop for 5 days before any procedure or surgery.            Leavitt Maximum Omega 2000    Each capsule contains    mg +  mg  Total fish oil 1300 mg  Take 2 capsules a day                                                                    Leavitt's Liquid Fish oil    1 Teaspoon (5ml) contains:  Calories 40  Fat grams 4.5    Omega-3 Fatty Acids 1,600mg  ---EPA (Eicosapentaenoic Acid) 800mg   ---DHA (Docosahexaenoic Acid) 500mg          EPA+DHA totals to 1300 mg    3 tsp a day will get you to about 4 gm of EPA +DHA          ============= ==================== ====================    Avoid a fish oil that states 1200 mg of fish oil but contains only 90 mg of EPA and 110 mg of DHA.   It has a lot of other Omega fatty acids that have not been shown to be beneficial.   Or, the one that states 1000 mg of fish oil but only 300 mg of Omega 3                 Also look at the serving size. In this example you need 2 capsules to get 500 mg of EPA and DHA

## 2024-05-24 ENCOUNTER — OFFICE VISIT (OUTPATIENT)
Dept: PODIATRY | Facility: CLINIC | Age: 79
End: 2024-05-24
Payer: MEDICARE

## 2024-05-24 VITALS
DIASTOLIC BLOOD PRESSURE: 80 MMHG | SYSTOLIC BLOOD PRESSURE: 145 MMHG | HEART RATE: 78 BPM | WEIGHT: 139.75 LBS | OXYGEN SATURATION: 100 % | BODY MASS INDEX: 23.86 KG/M2 | HEIGHT: 64 IN

## 2024-05-24 DIAGNOSIS — L60.0 INGROWN NAIL: Primary | ICD-10-CM

## 2024-05-24 PROCEDURE — 1126F AMNT PAIN NOTED NONE PRSNT: CPT | Mod: CPTII,S$GLB,, | Performed by: STUDENT IN AN ORGANIZED HEALTH CARE EDUCATION/TRAINING PROGRAM

## 2024-05-24 PROCEDURE — 1101F PT FALLS ASSESS-DOCD LE1/YR: CPT | Mod: CPTII,S$GLB,, | Performed by: STUDENT IN AN ORGANIZED HEALTH CARE EDUCATION/TRAINING PROGRAM

## 2024-05-24 PROCEDURE — 99999 PR PBB SHADOW E&M-EST. PATIENT-LVL IV: CPT | Mod: PBBFAC,,, | Performed by: STUDENT IN AN ORGANIZED HEALTH CARE EDUCATION/TRAINING PROGRAM

## 2024-05-24 PROCEDURE — 3288F FALL RISK ASSESSMENT DOCD: CPT | Mod: CPTII,S$GLB,, | Performed by: STUDENT IN AN ORGANIZED HEALTH CARE EDUCATION/TRAINING PROGRAM

## 2024-05-24 PROCEDURE — 3079F DIAST BP 80-89 MM HG: CPT | Mod: CPTII,S$GLB,, | Performed by: STUDENT IN AN ORGANIZED HEALTH CARE EDUCATION/TRAINING PROGRAM

## 2024-05-24 PROCEDURE — 3077F SYST BP >= 140 MM HG: CPT | Mod: CPTII,S$GLB,, | Performed by: STUDENT IN AN ORGANIZED HEALTH CARE EDUCATION/TRAINING PROGRAM

## 2024-05-24 PROCEDURE — 1159F MED LIST DOCD IN RCRD: CPT | Mod: CPTII,S$GLB,, | Performed by: STUDENT IN AN ORGANIZED HEALTH CARE EDUCATION/TRAINING PROGRAM

## 2024-05-24 PROCEDURE — 99213 OFFICE O/P EST LOW 20 MIN: CPT | Mod: S$GLB,,, | Performed by: STUDENT IN AN ORGANIZED HEALTH CARE EDUCATION/TRAINING PROGRAM

## 2024-05-24 NOTE — PROGRESS NOTES
Subjective:     Patient    Nesha Landa is a 78 y.o. female.    Problem    05/08/24: New to [facility]. Presents for ingrown toenails at bilateral 1st toenail medial borders. Gets pedicures for this usually but they recommended she have the ingrowns removed permanently. Feels better in open toe shoes. No other concerns.      05/24/24: Returns s/p bilateral 1st toenail ingrown procedures. Minimal pain, right worse than left. No concerns.     History    History obtained from patient and review of medical records.     Past Medical History:   Diagnosis Date    Coronary arteriosclerosis     HTN (hypertension)     Hypercholesteremia     LBBB (left bundle branch block)     Osteopenia        Past Surgical History:   Procedure Laterality Date    CATARACT EXTRACTION      10 years+ OU    HYSTERECTOMY      partial colectomy          Objective:     Vitals  Wt Readings from Last 1 Encounters:   05/24/24 63.4 kg (139 lb 12.4 oz)     Temp Readings from Last 1 Encounters:   05/09/24 97.4 °F (36.3 °C) (Tympanic)     BP Readings from Last 1 Encounters:   05/24/24 (!) 145/80     Pulse Readings from Last 1 Encounters:   05/24/24 78       Dermatological Exam    Skin:  Pedal hair growth, skin color, and skin texture normal on right  Pedal hair growth, skin color, and skin texture normal on left    Nails:  All nails normal in length, thickness, color except bilateral 1st toenail medial borders s/p avulsions, healing well    Vascular Exam    Arteries:  Posterior tibial artery palpable on right  Dorsalis pedis artery palpable on right  Posterior tibial artery palpable on left  Dorsalis pedis artery palpable on left    Veins:  Superficial veins unremarkable on right  Superficial veins unremarkable on left    Swelling:  None on right  None on left    Neurological Exam    [place] touch test:  6/6 sites sensed, light touch intact     Musculoskeletal Exam    Footwear:  Sandal on right  Sandal on left    Gait Exam:   Ambulatory Status:  Ambulatory  Gait: Normal  Assistive Devices: None    Foot Progression Angle:  Normal on right  Normal on left    Right Lower Extremity Additional Findings:  Right foot and ankle function, strength, and range of motion unremarkable except as noted above.     Left Lower Extremity Additional Findings:  Left foot and ankle function, strength, and range of motion unremarkable except as noted above.    Imaging and Other Tests    Imaging:  Independently reviewed and interpreted imaging, findings are as follows: N/A     Assessment:     Encounter Diagnosis   Name Primary?    Ingrown nail Yes          Plan:     I counseled the patient on her conditions, their implications and medical management.    S/p bilateral 1st toenail medial border permanent avulsions  -Treat as normal skin. Give at least 2 more weeks before pedicures.       Return to clinic PRN.

## 2024-05-28 NOTE — TELEPHONE ENCOUNTER
----- Message from Kaylee S Sathish sent at 5/28/2024 12:51 PM CDT -----  Contact: Home  [phone]  Requesting an RX refill or new RX.  Is this a refill or new RX: New  RX name and strength ALPRAZolam (XANAX) 0.25 MG tablet  Is this a 30 day or 90 day RX: 20  Pharmacy name and phone #   [facility] - [place] - [address]  [address]  [place] [address]  Phone: [phone] Fax: [phone]  The doctors have asked that we provide their patients with the following 2 reminders -- prescription refills can take up to 72 hours, and a friendly reminder that in the future you can use your [facility] account to request refills:   Comment: Patient is scheduled to see you as a new patient on 06/18/2024. Please give the patient a call if they're any issues.

## 2024-05-29 RX ORDER — ALPRAZOLAM 0.25 MG/1
0.25 TABLET ORAL DAILY PRN
Qty: 20 TABLET | Refills: 1 | Status: SHIPPED | OUTPATIENT
Start: 2024-05-29

## 2024-05-29 NOTE — TELEPHONE ENCOUNTER
----- Message from Luz Andradekirstie sent at 5/29/2024  8:28 AM CDT -----  Contact: patient  [phone]  Requesting an RX refill or new RX.  Is this a refill or new RX:   RX name and strength ALPRAZolam (XANAX) 0.25 MG tablet  Is this a 30 day or 90 day RX:   Pharmacy name and phone #   [facility] [place] - [address]  [address]  [place] [address]  Phone: [phone] Fax: [phone]          The doctors have asked that we provide their patients with the following 2 reminders -- prescription refills can take up to 72 hours, and a friendly reminder that in the future you can use your [facility] account to request refills: yes

## 2024-05-31 ENCOUNTER — HOSPITAL ENCOUNTER (OUTPATIENT)
Dept: CARDIOLOGY | Facility: HOSPITAL | Age: 79
Discharge: HOME | End: 2024-05-31
Attending: PHYSICIAN ASSISTANT
Payer: MEDICARE

## 2024-05-31 VITALS
HEART RATE: 62 BPM | DIASTOLIC BLOOD PRESSURE: 70 MMHG | BODY MASS INDEX: 23.73 KG/M2 | HEIGHT: 64 IN | WEIGHT: 139 LBS | SYSTOLIC BLOOD PRESSURE: 140 MMHG

## 2024-05-31 DIAGNOSIS — I77.810 ASCENDING AORTA DILATION: ICD-10-CM

## 2024-05-31 LAB
ASCENDING AORTA: 3.95 CM
AV INDEX (PROSTH): 0.8
AV MEAN GRADIENT: 4 MMHG
AV PEAK GRADIENT: 7 MMHG
AV VALVE AREA BY VELOCITY RATIO: 2.73 CM²
AV VALVE AREA: 2.58 CM²
AV VELOCITY RATIO: 0.84
BSA FOR ECHO PROCEDURE: 1.69 M2
CV ECHO LV RWT: 0.62 CM
DOP CALC AO PEAK VEL: 1.34 M/S
DOP CALC AO VTI: 28.47 CM
DOP CALC LVOT AREA: 3.2 CM2
DOP CALC LVOT DIAMETER: 2.03 CM
DOP CALC LVOT PEAK VEL: 1.13 M/S
DOP CALC LVOT STROKE VOLUME: 73.4 CM3
DOP CALC RVOT PEAK VEL: 0.92 M/S
DOP CALC RVOT VTI: 17.1 CM
DOP CALCLVOT PEAK VEL VTI: 22.69 CM
E WAVE DECELERATION TIME: 236.7 MSEC
E/A RATIO: 0.64
E/E' RATIO: 11.6 M/S
ECHO LV POSTERIOR WALL: 0.96 CM (ref 0.6–1.1)
EJECTION FRACTION: 58 %
FRACTIONAL SHORTENING: 35 % (ref 28–44)
INTERVENTRICULAR SEPTUM: 1.07 CM (ref 0.6–1.1)
LA MAJOR: 5.5 CM
LA MINOR: 5.48 CM
LA WIDTH: 3.27 CM
LEFT ATRIUM SIZE: 3.74 CM
LEFT ATRIUM VOLUME INDEX MOD: 35.1 ML/M2
LEFT ATRIUM VOLUME INDEX: 34 ML/M2
LEFT ATRIUM VOLUME MOD: 58.92 CM3
LEFT ATRIUM VOLUME: 57.07 CM3
LEFT INTERNAL DIMENSION IN SYSTOLE: 2.03 CM (ref 2.1–4)
LEFT VENTRICLE DIASTOLIC VOLUME INDEX: 22.51 ML/M2
LEFT VENTRICLE DIASTOLIC VOLUME: 37.81 ML
LEFT VENTRICLE MASS INDEX: 52 G/M2
LEFT VENTRICLE SYSTOLIC VOLUME INDEX: 7.9 ML/M2
LEFT VENTRICLE SYSTOLIC VOLUME: 13.29 ML
LEFT VENTRICULAR INTERNAL DIMENSION IN DIASTOLE: 3.1 CM (ref 3.5–6)
LEFT VENTRICULAR MASS: 88.14 G
LV LATERAL E/E' RATIO: 9.67 M/S
LV SEPTAL E/E' RATIO: 14.5 M/S
MV A" WAVE DURATION": 15.13 MSEC
MV PEAK A VEL: 0.91 M/S
MV PEAK E VEL: 0.58 M/S
MV STENOSIS PRESSURE HALF TIME: 68.64 MS
MV VALVE AREA P 1/2 METHOD: 3.21 CM2
OHS CV RV/LV RATIO: 0.75 CM
PISA TR MAX VEL: 2.15 M/S
PULM VEIN S/D RATIO: 2.38
PV MEAN GRADIENT: 2 MMHG
PV PEAK D VEL: 0.21 M/S
PV PEAK GRADIENT: 5 MMHG
PV PEAK S VEL: 0.5 M/S
PV PEAK VELOCITY: 1.15 M/S
RA MAJOR: 4.14 CM
RA PRESSURE ESTIMATED: 3 MMHG
RA WIDTH: 3.11 CM
RIGHT VENTRICULAR END-DIASTOLIC DIMENSION: 2.33 CM
RV TB RVSP: 5 MMHG
SINUS: 3.97 CM
STJ: 3.47 CM
TDI LATERAL: 0.06 M/S
TDI SEPTAL: 0.04 M/S
TDI: 0.05 M/S
TR MAX PG: 18 MMHG
TRICUSPID ANNULAR PLANE SYSTOLIC EXCURSION: 2.3 CM
TV REST PULMONARY ARTERY PRESSURE: 21 MMHG
Z-SCORE OF LEFT VENTRICULAR DIMENSION IN END DIASTOLE: -4.13
Z-SCORE OF LEFT VENTRICULAR DIMENSION IN END SYSTOLE: -2.83

## 2024-05-31 PROCEDURE — 93306 TTE W/DOPPLER COMPLETE: CPT | Mod: PO

## 2024-05-31 PROCEDURE — 93306 TTE W/DOPPLER COMPLETE: CPT | Mod: 26,,, | Performed by: INTERNAL MEDICINE

## 2024-06-04 ENCOUNTER — TELEPHONE (OUTPATIENT)
Dept: CARDIOLOGY | Facility: CLINIC | Age: 79
End: 2024-06-04
Payer: MEDICARE

## 2024-06-04 NOTE — TELEPHONE ENCOUNTER
----- Message from Debbie Boateng PA-C sent at 6/4/2024 12:53 PM CDT -----  Please let the patient know her echocardiogram is normal. There has been no change to the size of her aorta. We will plan to check again probably in 2-3 years.     Thanks,   Debbie

## 2024-06-18 ENCOUNTER — OFFICE VISIT (OUTPATIENT)
Dept: INTERNAL MEDICINE | Facility: CLINIC | Age: 79
End: 2024-06-18
Payer: MEDICARE

## 2024-06-18 ENCOUNTER — LAB VISIT (OUTPATIENT)
Dept: LAB | Facility: HOSPITAL | Age: 79
End: 2024-06-18
Attending: INTERNAL MEDICINE
Payer: MEDICARE

## 2024-06-18 VITALS
SYSTOLIC BLOOD PRESSURE: 154 MMHG | HEIGHT: 64 IN | OXYGEN SATURATION: 96 % | HEART RATE: 62 BPM | BODY MASS INDEX: 23.51 KG/M2 | WEIGHT: 137.69 LBS | DIASTOLIC BLOOD PRESSURE: 78 MMHG

## 2024-06-18 DIAGNOSIS — M81.0 POSTMENOPAUSAL OSTEOPOROSIS: ICD-10-CM

## 2024-06-18 DIAGNOSIS — Z12.31 SCREENING MAMMOGRAM, ENCOUNTER FOR: ICD-10-CM

## 2024-06-18 DIAGNOSIS — N39.0 URINARY TRACT INFECTION WITHOUT HEMATURIA, SITE UNSPECIFIED: ICD-10-CM

## 2024-06-18 DIAGNOSIS — I25.84 CORONARY ARTERY DISEASE DUE TO CALCIFIED CORONARY LESION: ICD-10-CM

## 2024-06-18 DIAGNOSIS — I10 ESSENTIAL HYPERTENSION: Primary | ICD-10-CM

## 2024-06-18 DIAGNOSIS — I25.10 CORONARY ARTERY DISEASE DUE TO CALCIFIED CORONARY LESION: ICD-10-CM

## 2024-06-18 DIAGNOSIS — E78.00 HYPERCHOLESTEREMIA: ICD-10-CM

## 2024-06-18 DIAGNOSIS — R73.9 HYPERGLYCEMIA: ICD-10-CM

## 2024-06-18 DIAGNOSIS — K21.9 GASTROESOPHAGEAL REFLUX DISEASE WITHOUT ESOPHAGITIS: ICD-10-CM

## 2024-06-18 DIAGNOSIS — E55.9 MILD VITAMIN D DEFICIENCY: ICD-10-CM

## 2024-06-18 LAB
BACTERIA #/AREA URNS AUTO: ABNORMAL /HPF
BILIRUB UR QL STRIP: ABNORMAL
CLARITY UR REFRACT.AUTO: ABNORMAL
COLOR UR AUTO: YELLOW
GLUCOSE UR QL STRIP: NEGATIVE
HGB UR QL STRIP: NEGATIVE
HYALINE CASTS UR QL AUTO: 10 /LPF
KETONES UR QL STRIP: NEGATIVE
LEUKOCYTE ESTERASE UR QL STRIP: ABNORMAL
MICROSCOPIC COMMENT: ABNORMAL
NITRITE UR QL STRIP: NEGATIVE
NON-SQ EPI CELLS #/AREA URNS AUTO: 1 /HPF
PH UR STRIP: 7 [PH] (ref 5–8)
PROT UR QL STRIP: ABNORMAL
RBC #/AREA URNS AUTO: 5 /HPF (ref 0–4)
SP GR UR STRIP: 1.02 (ref 1–1.03)
SQUAMOUS #/AREA URNS AUTO: 5 /HPF
URN SPEC COLLECT METH UR: ABNORMAL
WBC #/AREA URNS AUTO: 27 /HPF (ref 0–5)

## 2024-06-18 PROCEDURE — 87088 URINE BACTERIA CULTURE: CPT | Mod: HCNC | Performed by: INTERNAL MEDICINE

## 2024-06-18 PROCEDURE — G0009 ADMIN PNEUMOCOCCAL VACCINE: HCPCS | Mod: HCNC,S$GLB,, | Performed by: INTERNAL MEDICINE

## 2024-06-18 PROCEDURE — 90677 PCV20 VACCINE IM: CPT | Mod: HCNC,S$GLB,, | Performed by: INTERNAL MEDICINE

## 2024-06-18 PROCEDURE — 81001 URINALYSIS AUTO W/SCOPE: CPT | Mod: HCNC | Performed by: INTERNAL MEDICINE

## 2024-06-18 PROCEDURE — 99999 PR PBB SHADOW E&M-EST. PATIENT-LVL V: CPT | Mod: PBBFAC,HCNC,, | Performed by: INTERNAL MEDICINE

## 2024-06-18 PROCEDURE — 1101F PT FALLS ASSESS-DOCD LE1/YR: CPT | Mod: HCNC,CPTII,S$GLB, | Performed by: INTERNAL MEDICINE

## 2024-06-18 PROCEDURE — 3288F FALL RISK ASSESSMENT DOCD: CPT | Mod: HCNC,CPTII,S$GLB, | Performed by: INTERNAL MEDICINE

## 2024-06-18 PROCEDURE — 99214 OFFICE O/P EST MOD 30 MIN: CPT | Mod: HCNC,S$GLB,, | Performed by: INTERNAL MEDICINE

## 2024-06-18 PROCEDURE — 87186 SC STD MICRODIL/AGAR DIL: CPT | Mod: 59,HCNC | Performed by: INTERNAL MEDICINE

## 2024-06-18 PROCEDURE — 3077F SYST BP >= 140 MM HG: CPT | Mod: HCNC,CPTII,S$GLB, | Performed by: INTERNAL MEDICINE

## 2024-06-18 PROCEDURE — 1159F MED LIST DOCD IN RCRD: CPT | Mod: HCNC,CPTII,S$GLB, | Performed by: INTERNAL MEDICINE

## 2024-06-18 PROCEDURE — 87077 CULTURE AEROBIC IDENTIFY: CPT | Mod: 59,HCNC | Performed by: INTERNAL MEDICINE

## 2024-06-18 PROCEDURE — 3078F DIAST BP <80 MM HG: CPT | Mod: HCNC,CPTII,S$GLB, | Performed by: INTERNAL MEDICINE

## 2024-06-18 PROCEDURE — 1126F AMNT PAIN NOTED NONE PRSNT: CPT | Mod: HCNC,CPTII,S$GLB, | Performed by: INTERNAL MEDICINE

## 2024-06-18 PROCEDURE — 87184 SC STD DISK METHOD PER PLATE: CPT | Mod: HCNC | Performed by: INTERNAL MEDICINE

## 2024-06-18 PROCEDURE — 87086 URINE CULTURE/COLONY COUNT: CPT | Mod: HCNC | Performed by: INTERNAL MEDICINE

## 2024-06-18 RX ORDER — MIRTAZAPINE 7.5 MG/1
7.5 TABLET, FILM COATED ORAL NIGHTLY
Qty: 30 TABLET | Refills: 1 | Status: SHIPPED | OUTPATIENT
Start: 2024-06-18 | End: 2025-06-18

## 2024-06-18 NOTE — PROGRESS NOTES
Subjective:       Patient ID: Nesha Landa is a 78 y.o. female.    Chief Complaint: Establish Care    HPIPt is new to me - she was Dr. Angeles's patient - she is feeling well - No CP or SOB.  Review of Systems   Respiratory:  Negative for shortness of breath (PND or orthopnea).    Cardiovascular:  Negative for chest pain (arm pain or jaw pain).   Gastrointestinal:  Negative for abdominal pain, diarrhea, nausea and vomiting.   Genitourinary:  Negative for dysuria.   Neurological:  Negative for seizures, syncope and headaches.       Objective:      Physical Exam  Constitutional:       General: She is not in acute distress.     Appearance: She is well-developed.   HENT:      Head: Normocephalic.   Eyes:      Pupils: Pupils are equal, round, and reactive to light.   Neck:      Thyroid: No thyromegaly.      Vascular: No JVD.   Cardiovascular:      Rate and Rhythm: Normal rate and regular rhythm.      Heart sounds: Normal heart sounds. No murmur heard.     No friction rub. No gallop.   Pulmonary:      Effort: Pulmonary effort is normal.      Breath sounds: Normal breath sounds. No wheezing or rales.   Abdominal:      General: Bowel sounds are normal. There is no distension.      Palpations: Abdomen is soft. There is no mass.      Tenderness: There is no abdominal tenderness. There is no guarding or rebound.   Musculoskeletal:      Cervical back: Neck supple.   Lymphadenopathy:      Cervical: No cervical adenopathy.   Skin:     General: Skin is warm and dry.   Neurological:      Mental Status: She is alert and oriented to person, place, and time.      Deep Tendon Reflexes: Reflexes are normal and symmetric.   Psychiatric:         Behavior: Behavior normal.         Thought Content: Thought content normal.         Judgment: Judgment normal.         Assessment:       1. Essential hypertension    2. Hypercholesteremia    3. Coronary artery disease due to calcified coronary lesion    4. Gastroesophageal reflux disease without  esophagitis    5. Hyperglycemia    6. Mild vitamin D deficiency    7. Urinary tract infection without hematuria, site unspecified    8. Screening mammogram, encounter for    9. Postmenopausal osteoporosis        Plan:   Essential hypertension  -     TSH; Future; Expected date: 06/18/2024  -     Microalbumin/Creatinine Ratio, Urine; Future; Expected date: 06/18/2024    Hypercholesteremia  Controlled - continue current meds    Coronary artery disease due to calcified coronary lesion  Asymptomatic   Gastroesophageal reflux disease without esophagitis  Controlled - continue current meds    Hyperglycemia  -     CBC Auto Differential; Future; Expected date: 06/18/2024  -     Hemoglobin A1C; Future; Expected date: 06/18/2024    Mild vitamin D deficiency  -     Vitamin D; Future; Expected date: 06/18/2024    Urinary tract infection without hematuria, site unspecified  -     Urinalysis; Future; Expected date: 06/18/2024  -     Urine culture; Future; Expected date: 06/18/2024    Screening mammogram, encounter for  -     Mammo Digital Screening Bilat w/ Braden; Future; Expected date: 12/18/2024    Postmenopausal osteoporosis  -     Ambulatory referral/consult to Endocrinology; Future; Expected date: 06/18/2025  -     DXA Bone Density Axial Skeleton 1 or more sites; Future; Expected date: 06/18/2024  Relat did not agree with her - she is open to trying prolia  Other orders  -     (In Office Administered) Pneumococcal Conjugate Vaccine (20 Valent) (IM) (Preferred)  -     mirtazapine (REMERON) 7.5 MG Tab; Take 1 tablet (7.5 mg total) by mouth every evening.  Dispense: 30 tablet; Refill: 1- for insomnia         63 year old male with acute appendicitis and uncontrolled hypertension

## 2024-06-19 ENCOUNTER — TELEPHONE (OUTPATIENT)
Dept: INTERNAL MEDICINE | Facility: CLINIC | Age: 79
End: 2024-06-19
Payer: MEDICARE

## 2024-06-21 ENCOUNTER — NURSE TRIAGE (OUTPATIENT)
Dept: ADMINISTRATIVE | Facility: CLINIC | Age: 79
End: 2024-06-21
Payer: MEDICARE

## 2024-06-21 LAB
BACTERIA UR CULT: ABNORMAL
BACTERIA UR CULT: ABNORMAL

## 2024-06-22 NOTE — TELEPHONE ENCOUNTER
Pt states that she was seen in office on yesterday for UTI and prescribed Levaquin as ordered. Pt states that she took 2 doses yesterday and one dose today. C/o still having burning with urinating. Home care advised per protocol. VU. Encounter routed to provider.     Reason for Disposition   [1] Taking antibiotic < 72 hours (3 days) for UTI AND [2] painful urination or frequency is SAME (unchanged, not better)    Additional Information   Negative: Shock suspected (e.g., cold/pale/clammy skin, too weak to stand, low BP, rapid pulse)   Negative: Sounds like a life-threatening emergency to the triager   Negative: [1] Unable to urinate (or only a few drops) > 4 hours AND [2] bladder feels very full (e.g., palpable bladder or strong urge to urinate)   Negative: Passing pure blood or large blood clots (i.e., size > a dime)  (Exceptions: Flecks, small strands, or pinkish-red color)   Negative: Fever > 103 F (39.4 C)   Negative: Patient sounds very sick or weak to the triager   Negative: [1] SEVERE pain (e.g., excruciating) AND [2] no improvement 2 hours after pain medications   Negative: [1] Fever > 100 F (37.8 C) AND [2] new-onset since starting antibiotics   Negative: [1] Side (flank) or lower back pain AND [2] new-onset since starting antibiotics   Negative: [1] Taking antibiotic > 24 hours for UTI AND [2] flank or lower back pain getting WORSE   Negative: [1] Vomiting 2 or more times AND [2] interferes with taking oral antibiotic   Negative: [1] Taking antibiotic > 24 hours for UTI (urinary tract or bladder infection) AND [2] fever persists (still has fever)   Negative: [1] Taking antibiotic > 72 hours (3 days) for UTI AND [2] painful urination or frequency is SAME (unchanged, not better)   Negative: [1] Taking antibiotic > 72 hours (3 days) for UTI AND [2] flank or lower back pain is SAME (unchanged, not better)   Negative: Diabetes mellitus or weak immune system (e.g., HIV positive, cancer chemo, splenectomy, organ  transplant, chronic steroids)   Negative: Sickle cell disease   Negative: [1] Taking antibiotic < 24 hours for UTI AND [2] fever persists (still has fever)    Protocols used: Urinary Tract Infection on Antibiotic Follow-up Call - Female-A-

## 2024-06-25 ENCOUNTER — TELEPHONE (OUTPATIENT)
Dept: INTERNAL MEDICINE | Facility: CLINIC | Age: 79
End: 2024-06-25
Payer: MEDICARE

## 2024-06-25 ENCOUNTER — OFFICE VISIT (OUTPATIENT)
Dept: OPTOMETRY | Facility: CLINIC | Age: 79
End: 2024-06-25
Payer: COMMERCIAL

## 2024-06-25 DIAGNOSIS — I10 ESSENTIAL HYPERTENSION: Primary | ICD-10-CM

## 2024-06-25 DIAGNOSIS — Z96.1 PSEUDOPHAKIA OF BOTH EYES: ICD-10-CM

## 2024-06-25 DIAGNOSIS — Z83.518 FAMILY HISTORY OF MACULAR DEGENERATION: ICD-10-CM

## 2024-06-25 PROCEDURE — 99999 PR PBB SHADOW E&M-EST. PATIENT-LVL IV: CPT | Mod: PBBFAC,,, | Performed by: OPTOMETRIST

## 2024-06-25 NOTE — TELEPHONE ENCOUNTER
----- Message from Lucie Molina sent at 6/25/2024  8:25 AM CDT -----  Contact: [phone]  Patient called, stated that she called last week in regards her blood pressure, was told that someone from the office will call back but has not receive a call back. Please reach out. Thank you.

## 2024-06-25 NOTE — TELEPHONE ENCOUNTER
Called to update pt on status of her request. Pt pressure today was 113/73. Pt also states she did  her medication and has 1 day left for the rx on her UTI

## 2024-06-25 NOTE — PROGRESS NOTES
PATEL    ADI: 3/28/2023, Dr. Messina  Chief complaint (CC): Pt presents today for routine eye exam. Pt states no   vision complaints.  Glasses? +  Contacts? -  H/o eye surgery, injections or laser: Cataract OU  H/o eye injury: -  Known eye conditions? -  Family h/o eye conditions? Macular Degeneration   Eye gtts? -      (-) Flashes (+)  Floaters, occasional (-) Mucous   (-)  Tearing (-) Itching (-) Burning   (-) Headaches, occasional due to sinus (-) Eye Pain/discomfort (-)   Irritation   (-)  Redness (-) Double vision (-) Blurry vision    Diabetic? -  A1c? -     Last edited by Blossom Ferro MA on 6/25/2024 12:57 PM.            Assessment /Plan     For exam results, see Encounter Report.    Essential hypertension    Pseudophakia of both eyes    Family history of macular degeneration      No ocular signs at this time,. Monitor condition. Patient to report any changes. RTC 1 year recheck.  2. Monitor condition. Patient to report any changes. RTC 1 year recheck.  3. Monitor condition. Patient to report any changes. RTC 1 year recheck.

## 2024-07-03 RX ORDER — ALPRAZOLAM 0.25 MG/1
0.25 TABLET ORAL DAILY PRN
Qty: 20 TABLET | Refills: 1 | Status: CANCELLED | OUTPATIENT
Start: 2024-07-03

## 2024-07-03 NOTE — TELEPHONE ENCOUNTER
No care due was identified.  [facility] Embedded Care Due Messages. Reference number: [id number].   7/03/2024 10:08:44 AM CDT

## 2024-07-23 ENCOUNTER — TELEPHONE (OUTPATIENT)
Dept: CARDIOLOGY | Facility: CLINIC | Age: 79
End: 2024-07-23
Payer: MEDICARE

## 2024-07-23 ENCOUNTER — HOSPITAL ENCOUNTER (OUTPATIENT)
Facility: HOSPITAL | Age: 79
Discharge: HOME | End: 2024-07-24
Attending: EMERGENCY MEDICINE | Admitting: STUDENT IN AN ORGANIZED HEALTH CARE EDUCATION/TRAINING PROGRAM
Payer: MEDICARE

## 2024-07-23 DIAGNOSIS — R53.1 ACUTE RIGHT-SIDED WEAKNESS: ICD-10-CM

## 2024-07-23 DIAGNOSIS — R29.818 ACUTE FOCAL NEUROLOGICAL DEFICIT: ICD-10-CM

## 2024-07-23 DIAGNOSIS — I63.9 ACUTE STROKE DUE TO ISCHEMIA: Primary | ICD-10-CM

## 2024-07-23 DIAGNOSIS — I63.312 CEREBROVASCULAR ACCIDENT (CVA) DUE TO THROMBOSIS OF LEFT MIDDLE CEREBRAL ARTERY: ICD-10-CM

## 2024-07-23 LAB
ALBUMIN SERPL BCP-MCNC: 3.2 G/DL (ref 3.5–5.2)
ALP SERPL-CCNC: 58 U/L (ref 55–135)
ALT SERPL W/O P-5'-P-CCNC: 14 U/L (ref 10–44)
ANION GAP SERPL CALC-SCNC: 6 MMOL/L (ref 8–16)
AST SERPL-CCNC: 20 U/L (ref 10–40)
BASOPHILS # BLD AUTO: 0.02 K/UL (ref 0–0.2)
BASOPHILS NFR BLD: 0.4 % (ref 0–1.9)
BILIRUB SERPL-MCNC: 0.6 MG/DL (ref 0.1–1)
BNP SERPL-MCNC: 94 PG/ML (ref 0–99)
BUN SERPL-MCNC: 9 MG/DL (ref 8–23)
CALCIUM SERPL-MCNC: 9 MG/DL (ref 8.7–10.5)
CHLORIDE SERPL-SCNC: 104 MMOL/L (ref 95–110)
CHOLEST SERPL-MCNC: 149 MG/DL (ref 120–199)
CHOLEST/HDLC SERPL: 2.1 {RATIO} (ref 2–5)
CO2 SERPL-SCNC: 25 MMOL/L (ref 23–29)
CREAT SERPL-MCNC: 0.7 MG/DL (ref 0.5–1.4)
CREAT SERPL-MCNC: 0.8 MG/DL (ref 0.5–1.4)
DIFFERENTIAL METHOD BLD: ABNORMAL
EOSINOPHIL # BLD AUTO: 0.1 K/UL (ref 0–0.5)
EOSINOPHIL NFR BLD: 1.4 % (ref 0–8)
ERYTHROCYTE [DISTWIDTH] IN BLOOD BY AUTOMATED COUNT: 12.6 % (ref 11.5–14.5)
EST. GFR  (NO RACE VARIABLE): >60 ML/MIN/1.73 M^2
GLUCOSE SERPL-MCNC: 110 MG/DL (ref 70–110)
HCT VFR BLD AUTO: 37.9 % (ref 37–48.5)
HDLC SERPL-MCNC: 72 MG/DL (ref 40–75)
HDLC SERPL: 48.3 % (ref 20–50)
HGB BLD-MCNC: 12.6 G/DL (ref 12–16)
IMM GRANULOCYTES # BLD AUTO: 0.01 K/UL (ref 0–0.04)
IMM GRANULOCYTES NFR BLD AUTO: 0.2 % (ref 0–0.5)
INR PPP: 1 (ref 0.8–1.2)
LDLC SERPL CALC-MCNC: 57.4 MG/DL (ref 63–159)
LYMPHOCYTES # BLD AUTO: 1 K/UL (ref 1–4.8)
LYMPHOCYTES NFR BLD: 18.1 % (ref 18–48)
MCH RBC QN AUTO: 31.2 PG (ref 27–31)
MCHC RBC AUTO-ENTMCNC: 33.2 G/DL (ref 32–36)
MCV RBC AUTO: 94 FL (ref 82–98)
MONOCYTES # BLD AUTO: 0.5 K/UL (ref 0.3–1)
MONOCYTES NFR BLD: 9.1 % (ref 4–15)
NEUTROPHILS # BLD AUTO: 4 K/UL (ref 1.8–7.7)
NEUTROPHILS NFR BLD: 70.8 % (ref 38–73)
NONHDLC SERPL-MCNC: 77 MG/DL
NRBC BLD-RTO: 0 /100 WBC
OHS QRS DURATION: 152 MS
OHS QTC CALCULATION: 428 MS
PLATELET # BLD AUTO: 190 K/UL (ref 150–450)
PMV BLD AUTO: 11.1 FL (ref 9.2–12.9)
POC PTINR: 1.1 (ref 0.9–1.2)
POC PTWBT: 13.6 SEC (ref 9.7–14.3)
POCT GLUCOSE: 132 MG/DL (ref 70–110)
POTASSIUM SERPL-SCNC: 3.3 MMOL/L (ref 3.5–5.1)
PROT SERPL-MCNC: 6.2 G/DL (ref 6–8.4)
PROTHROMBIN TIME: 11.3 SEC (ref 9–12.5)
RBC # BLD AUTO: 4.04 M/UL (ref 4–5.4)
SAMPLE: NORMAL
SAMPLE: NORMAL
SODIUM SERPL-SCNC: 135 MMOL/L (ref 136–145)
TRIGL SERPL-MCNC: 98 MG/DL (ref 30–150)
TROPONIN I SERPL DL<=0.01 NG/ML-MCNC: <0.006 NG/ML (ref 0–0.03)
TSH SERPL DL<=0.005 MIU/L-ACNC: 1.81 UIU/ML (ref 0.4–4)
WBC # BLD AUTO: 5.63 K/UL (ref 3.9–12.7)

## 2024-07-23 PROCEDURE — G0378 HOSPITAL OBSERVATION PER HR: HCPCS | Mod: HCNC

## 2024-07-23 PROCEDURE — 96374 THER/PROPH/DIAG INJ IV PUSH: CPT | Mod: HCNC

## 2024-07-23 PROCEDURE — 99900035 HC TECH TIME PER 15 MIN (STAT): Mod: HCNC

## 2024-07-23 PROCEDURE — 85610 PROTHROMBIN TIME: CPT | Mod: HCNC

## 2024-07-23 PROCEDURE — 84484 ASSAY OF TROPONIN QUANT: CPT | Mod: HCNC

## 2024-07-23 PROCEDURE — 80061 LIPID PANEL: CPT | Mod: HCNC

## 2024-07-23 PROCEDURE — 82565 ASSAY OF CREATININE: CPT | Mod: HCNC

## 2024-07-23 PROCEDURE — 80053 COMPREHEN METABOLIC PANEL: CPT | Mod: HCNC

## 2024-07-23 PROCEDURE — 96372 THER/PROPH/DIAG INJ SC/IM: CPT

## 2024-07-23 PROCEDURE — 82962 GLUCOSE BLOOD TEST: CPT | Mod: HCNC

## 2024-07-23 PROCEDURE — 63600175 PHARM REV CODE 636 W HCPCS: Mod: HCNC

## 2024-07-23 PROCEDURE — 84443 ASSAY THYROID STIM HORMONE: CPT | Mod: HCNC

## 2024-07-23 PROCEDURE — 93010 ELECTROCARDIOGRAM REPORT: CPT | Mod: HCNC,,, | Performed by: INTERNAL MEDICINE

## 2024-07-23 PROCEDURE — 83880 ASSAY OF NATRIURETIC PEPTIDE: CPT | Mod: HCNC

## 2024-07-23 PROCEDURE — 96361 HYDRATE IV INFUSION ADD-ON: CPT | Mod: HCNC

## 2024-07-23 PROCEDURE — 93005 ELECTROCARDIOGRAM TRACING: CPT | Mod: HCNC

## 2024-07-23 PROCEDURE — 85025 COMPLETE CBC W/AUTO DIFF WBC: CPT | Mod: HCNC

## 2024-07-23 PROCEDURE — 25000003 PHARM REV CODE 250: Mod: HCNC

## 2024-07-23 PROCEDURE — 99291 CRITICAL CARE FIRST HOUR: CPT | Mod: HCNC

## 2024-07-23 PROCEDURE — 25500020 PHARM REV CODE 255: Mod: HCNC | Performed by: EMERGENCY MEDICINE

## 2024-07-23 PROCEDURE — 25000003 PHARM REV CODE 250: Mod: HCNC | Performed by: NURSE PRACTITIONER

## 2024-07-23 PROCEDURE — 85610 PROTHROMBIN TIME: CPT | Mod: HCNC,91

## 2024-07-23 RX ORDER — BISACODYL 10 MG/1
10 SUPPOSITORY RECTAL DAILY PRN
Status: DISCONTINUED | OUTPATIENT
Start: 2024-07-23 | End: 2024-07-24 | Stop reason: HOSPADM

## 2024-07-23 RX ORDER — HEPARIN SODIUM 5000 [USP'U]/ML
5000 INJECTION, SOLUTION INTRAVENOUS; SUBCUTANEOUS EVERY 8 HOURS
Status: DISCONTINUED | OUTPATIENT
Start: 2024-07-23 | End: 2024-07-24 | Stop reason: HOSPADM

## 2024-07-23 RX ORDER — CLOPIDOGREL BISULFATE 75 MG/1
75 TABLET ORAL DAILY
Status: DISCONTINUED | OUTPATIENT
Start: 2024-07-24 | End: 2024-07-24 | Stop reason: HOSPADM

## 2024-07-23 RX ORDER — LABETALOL HCL 20 MG/4 ML
10 SYRINGE (ML) INTRAVENOUS
Status: DISCONTINUED | OUTPATIENT
Start: 2024-07-23 | End: 2024-07-24 | Stop reason: HOSPADM

## 2024-07-23 RX ORDER — TALC
6 POWDER (GRAM) TOPICAL NIGHTLY PRN
Status: DISCONTINUED | OUTPATIENT
Start: 2024-07-23 | End: 2024-07-24 | Stop reason: HOSPADM

## 2024-07-23 RX ORDER — ASPIRIN 325 MG
325 TABLET ORAL ONCE
Status: COMPLETED | OUTPATIENT
Start: 2024-07-23 | End: 2024-07-23

## 2024-07-23 RX ORDER — CLOPIDOGREL BISULFATE 300 MG/1
300 TABLET, FILM COATED ORAL ONCE
Status: COMPLETED | OUTPATIENT
Start: 2024-07-23 | End: 2024-07-23

## 2024-07-23 RX ORDER — ASPIRIN 81 MG/1
81 TABLET ORAL DAILY
Status: DISCONTINUED | OUTPATIENT
Start: 2024-07-24 | End: 2024-07-24 | Stop reason: HOSPADM

## 2024-07-23 RX ORDER — SODIUM CHLORIDE 9 MG/ML
INJECTION, SOLUTION INTRAVENOUS CONTINUOUS
Status: ACTIVE | OUTPATIENT
Start: 2024-07-23 | End: 2024-07-23

## 2024-07-23 RX ORDER — ATORVASTATIN CALCIUM 40 MG/1
80 TABLET, FILM COATED ORAL DAILY
Status: DISCONTINUED | OUTPATIENT
Start: 2024-07-24 | End: 2024-07-24 | Stop reason: HOSPADM

## 2024-07-23 RX ORDER — SODIUM CHLORIDE 0.9 % (FLUSH) 0.9 %
10 SYRINGE (ML) INJECTION
Status: DISCONTINUED | OUTPATIENT
Start: 2024-07-23 | End: 2024-07-24 | Stop reason: HOSPADM

## 2024-07-23 RX ORDER — LORAZEPAM 2 MG/ML
1 INJECTION INTRAMUSCULAR
Status: COMPLETED | OUTPATIENT
Start: 2024-07-23 | End: 2024-07-23

## 2024-07-23 RX ORDER — ACETAMINOPHEN 500 MG
1000 TABLET ORAL EVERY 6 HOURS PRN
Status: DISCONTINUED | OUTPATIENT
Start: 2024-07-23 | End: 2024-07-24 | Stop reason: HOSPADM

## 2024-07-23 RX ADMIN — SODIUM CHLORIDE: 9 INJECTION, SOLUTION INTRAVENOUS at 11:07

## 2024-07-23 RX ADMIN — ASPIRIN 325 MG ORAL TABLET 325 MG: 325 PILL ORAL at 11:07

## 2024-07-23 RX ADMIN — HEPARIN SODIUM 5000 UNITS: 5000 INJECTION INTRAVENOUS; SUBCUTANEOUS at 09:07

## 2024-07-23 RX ADMIN — Medication 6 MG: at 11:07

## 2024-07-23 RX ADMIN — CLOPIDOGREL BISULFATE 300 MG: 300 TABLET, FILM COATED ORAL at 11:07

## 2024-07-23 RX ADMIN — ACETAMINOPHEN 1000 MG: 500 TABLET ORAL at 07:07

## 2024-07-23 RX ADMIN — LORAZEPAM 1 MG: 2 INJECTION INTRAMUSCULAR; INTRAVENOUS at 02:07

## 2024-07-23 RX ADMIN — IOHEXOL 100 ML: 350 INJECTION, SOLUTION INTRAVENOUS at 10:07

## 2024-07-23 RX ADMIN — SODIUM CHLORIDE 1000 ML: 9 INJECTION, SOLUTION INTRAVENOUS at 11:07

## 2024-07-23 NOTE — H&P
[facility] - Emergency Dept  Vascular Neurology  [facility]  History & Physical    MRI positive for acute infarct. Patient to be admitted to Vascular Neurology for closer monitoring. Please see consult note dated 7/23/2024.    Iram Calle MD  [facility]  Department of Vascular Neurology   [facility] Emergency Dept

## 2024-07-23 NOTE — ED TRIAGE NOTES
Pt arrived via EMS, woke up at 3am to use the bathroom and noticed her right leg and arm dragging and weakened.

## 2024-07-23 NOTE — CONSULTS
[facility] - Emergency Dept  Vascular Neurology  Comprehensive St[facility] Center  Consult Note    Inpatient consult to Vascular (Stroke) Neurology  Consult performed by: Iram Calle MD  Consult ordered by: Javier Gimenez MD        Assessment/Plan:     Patient is a 78 y.o. year old female with:    Acute right-sided weakness  79 yo F with PMH of HTN, HLD, and CAD who presented to the ED with new right-sided weakness and numbness starting at 3:00AM (she woke up not being able to move her right side at all, gradually improved at 6:00AM but never returned to baseline.) NIHSS on my exam was 1 for subtle RUE drift. CTA MP negative for LVO or high-grade stenosis. Remain highly suspicious for acute infarct.    Recommendations:  - STAT MRI Brain without contrast  - Loading doses of ASA and Plavix  - NS 1 L bolus + infusion @ 100 cc/hr  - HOB flat    We will follow-up imaging. Please reach out to us with any questions or for new changes in neuro exam.       STROKE DOCUMENTATION     Acute Stroke Times   Last Known Normal Date: 07/22/24  Last Known Normal Time: 0300  Unknown Normal Time: Unknown Time  Symptom Onset Date: 07/23/24  Symptom Onset Time: 0300  Unknown Symptom Onset Time: Unknown Time  Stroke Team Called Date: 07/23/24  Stroke Team Called Time: 1007  Stroke Team Arrival Date: 07/23/24  Stroke Team Arrival Time: 1007  CT Interpretation Time: 1024  Thrombolytic Therapy Recommended: No  CTA Interpretation Time: 1030  Thrombectomy Recommended: No    NIH Scale:  1a. Level of Consciousness: 0-->Alert, keenly responsive  1b. LOC Questions: 0-->Answers both questions correctly  1c. LOC Commands: 0-->Performs both tasks correctly  2. Best Gaze: 0-->Normal  3. Visual: 0-->No visual loss  4. Facial Palsy: 0-->Normal symmetrical movements  5a. Motor Arm, Left: 0-->No drift, limb holds 90 (or 45) degrees for full 10 secs  5b. Motor Arm, Right: 1-->Drift, limb holds 90 (or 45) degrees, but drifts down before full 10 secs, does not hit  bed or other support  6a. Motor Leg, Left: 0-->No drift, leg holds 30 degree position for full 5 secs  6b. Motor Leg, Right: 0-->No drift, leg holds 30 degree position for full 5 secs  7. Limb Ataxia: 0-->Absent  8. Sensory: 0-->Normal, no sensory loss  9. Best Language: 0-->No aphasia, normal  10. Dysarthria: 0-->Normal  11. Extinction and Inattention (formerly Neglect): 0-->No abnormality  Total (NIH Stroke Scale): 1    Modified Yuridia Score: 0  Edmond Coma Scale:15   ABCD2 Score:    [id number] Score:   HAS -BLED Score:   ICH Score:   Hunt & Lopez Classification:       Thrombolysis Candidate? No, Out of window - Symptom onset > 4.5 hours    Delays to Thrombolysis?  Not Applicable    Interventional Revascularization Candidate?   Is the patient eligible for mechanical endovascular reperfusion (YUNIOR)?  No; no significant neurologic deficit (NIHSS <6)     Delays to Thrombectomy? Not Applicable    Hemorrhagic change of an Ischemic Stroke: Does this patient have an ischemic stroke with hemorrhagic changes? No     Subjective:     History of Present Illness:  Nesha Landa is an 79 yo F with PMH of HTN, HLD, and CAD who presented to the ED with new right-sided weakness and numbness, a Stroke Code was activated upon her arrival. She states that when she woke up at 3:00AM this morning, she noticed that she was her right-side felt numb and heavy and she was unable to move her right arm and leg - she reports that she used her left arm to  her right arm. Her symptoms improved a bit around 6:00AM, but never fully resolved. She called her Cardiologist's office who told her to go to the ED for further evaluation.           Past Medical History:   Diagnosis Date    Arthritis     Coronary arteriosclerosis     HTN (hypertension)     Hypercholesteremia     LBBB (left bundle branch block)     Osteopenia      Past Surgical History:   Procedure Laterality Date    CATARACT EXTRACTION      10 years+ OU    HYSTERECTOMY      partial  colectomy       Social History     Tobacco Use    Smoking status: Never    Smokeless tobacco: Never   Substance Use Topics    Alcohol use: No    Drug use: No     Review of patient's allergies indicates:   Allergen Reactions    Ciprofloxacin Nausea Only    Sulfa (sulfonamide antibiotics)     Metronidazole Nausea Only and Nausea And Vomiting       Medications: I have reviewed the current medication administration record.    (Not in a hospital admission)      Review of Systems   Neurological:  Positive for weakness and numbness.   All other systems reviewed and are negative.    Objective:     Vital Signs (Most Recent):  Temp: 98.8 °F (37.1 °C) (07/23/24 0956)  Pulse: 67 (07/23/24 0956)  Resp: 20 (07/23/24 0956)  BP: (!) 152/80 (07/23/24 0956)  SpO2: 97 % (07/23/24 0956)    Vital Signs Range (Last 24H):  Temp:  [98.8 °F (37.1 °C)]   Pulse:  [67]   Resp:  [20]   BP: (152)/(80)   SpO2:  [97 %]        Physical Exam  Vitals and nursing note reviewed.   Constitutional:       Appearance: Normal appearance. She is normal weight.   HENT:      Head: Normocephalic and atraumatic.   Pulmonary:      Effort: Pulmonary effort is normal. No respiratory distress.   Musculoskeletal:      Right lower leg: No edema.      Left lower leg: No edema.   Skin:     General: Skin is warm and dry.   Neurological:      Mental Status: She is alert.          Neurological Exam:   LOC: alert  Attention Span: Good   Language: No aphasia  Articulation: No dysarthria  Orientation: Person, Place, Time   Visual Fields: Full  EOM (CN III, IV, VI): Full/intact  Facial Sensation (CN V): Normal  Facial Movement (CN VII): Symmetric facial expression    Motor: Arm left  Normal 5/5  Leg left  Normal 5/5  Arm right  Paresis: 4/5  Leg right Normal 5/5  Weakness with wrist extension on right  Cerebellum: No evidence of appendicular or axial ataxia  Sensation: Intact to light touch, temperature and vibration      Laboratory:  CMP:   Recent Labs   Lab 07/23/24  1055    CALCIUM 9.0   ALBUMIN 3.2*   PROT 6.2   *   K 3.3*   CO2 25      BUN 9   CREATININE 0.8   ALKPHOS 58   ALT 14   AST 20   BILITOT 0.6     CBC:   Recent Labs   Lab 24  1055   WBC 5.63   RBC 4.04   HGB 12.6   HCT 37.9      MCV 94   MCH 31.2*   MCHC 33.2     Lipid Panel:   Recent Labs   Lab 24  1055   CHOL 149   LDLCALC 57.4*   HDL 72   TRIG 98     TSH:   Recent Labs   Lab 24  1055   TSH 1.807       Diagnostic Results:    Brain Imagin2024 MRI Brain pending    Vessel Imagin2024 CTA Stroke MP  - No acute intracranial process.  - No significant stenosis at the carotid bifurcations by NASCET criteria.  Mild areas of narrowing of the vertebral arteries bilaterally.  - No major branch advanced stenosis/occlusion at the [id number]-[id number].    Cardiac Evaluation:   2024 EKG  Normal sinus rhythm   Left bundle branch block   Abnormal ECG     2024 TTE    Left Ventricle: The left ventricle is normal in size. Mild septal thickening. There is concentric remodeling. Regional wall motion abnormalities present. Septal motion is abnormal. There is normal systolic function with a visually estimated ejection fraction of 55 - 60%. Ejection fraction by visual approximation is 58%. There is indeterminate diastolic function.    Right Ventricle: Normal right ventricular cavity size. Wall thickness is normal. Systolic function is normal.    Aortic Valve: The aortic valve is a trileaflet valve. There is mild aortic valve sclerosis. There is mild annular calcification present. There is mild aortic regurgitation.    Tricuspid Valve: There is mild regurgitation.    Pulmonary Artery: The estimated pulmonary artery systolic pressure is 21 mmHg.    IVC/SVC: Normal venous pressure at 3 mmHg.      Iram Calle MD  [facility]  Department of Vascular Neurology   [facility]

## 2024-07-23 NOTE — SUBJECTIVE & OBJECTIVE
Past Medical History:   Diagnosis Date    Arthritis     Coronary arteriosclerosis     HTN (hypertension)     Hypercholesteremia     LBBB (left bundle branch block)     Osteopenia      Past Surgical History:   Procedure Laterality Date    CATARACT EXTRACTION      10 years+ OU    HYSTERECTOMY      partial colectomy       Social History     Tobacco Use    Smoking status: Never    Smokeless tobacco: Never   Substance Use Topics    Alcohol use: No    Drug use: No     Review of patient's allergies indicates:   Allergen Reactions    Ciprofloxacin Nausea Only    Sulfa (sulfonamide antibiotics)     Metronidazole Nausea Only and Nausea And Vomiting       Medications: I have reviewed the current medication administration record.    (Not in a hospital admission)      Review of Systems   Neurological:  Positive for weakness and numbness.   All other systems reviewed and are negative.    Objective:     Vital Signs (Most Recent):  Temp: 98.8 °F (37.1 °C) (07/23/24 0956)  Pulse: 67 (07/23/24 0956)  Resp: 20 (07/23/24 0956)  BP: (!) 152/80 (07/23/24 0956)  SpO2: 97 % (07/23/24 0956)    Vital Signs Range (Last 24H):  Temp:  [98.8 °F (37.1 °C)]   Pulse:  [67]   Resp:  [20]   BP: (152)/(80)   SpO2:  [97 %]        Physical Exam  Vitals and nursing note reviewed.   Constitutional:       Appearance: Normal appearance. She is normal weight.   HENT:      Head: Normocephalic and atraumatic.   Pulmonary:      Effort: Pulmonary effort is normal. No respiratory distress.   Musculoskeletal:      Right lower leg: No edema.      Left lower leg: No edema.   Skin:     General: Skin is warm and dry.   Neurological:      Mental Status: She is alert.          Neurological Exam:   LOC: alert  Attention Span: Good   Language: No aphasia  Articulation: No dysarthria  Orientation: Person, Place, Time   Visual Fields: Full  EOM (CN III, IV, VI): Full/intact  Facial Sensation (CN V): Normal  Facial Movement (CN VII): Symmetric facial expression     Motor: Arm left  Normal 5/5  Leg left  Normal 5/5  Arm right  Paresis: 4/5  Leg right Normal 5/5  Weakness with wrist extension on right  Cerebellum: No evidence of appendicular or axial ataxia  Sensation: Intact to light touch, temperature and vibration      Laboratory:  CMP:   Recent Labs   Lab 24  1055   CALCIUM 9.0   ALBUMIN 3.2*   PROT 6.2   *   K 3.3*   CO2 25      BUN 9   CREATININE 0.8   ALKPHOS 58   ALT 14   AST 20   BILITOT 0.6     CBC:   Recent Labs   Lab 24  1055   WBC 5.63   RBC 4.04   HGB 12.6   HCT 37.9      MCV 94   MCH 31.2*   MCHC 33.2     Lipid Panel:   Recent Labs   Lab 24  1055   CHOL 149   LDLCALC 57.4*   HDL 72   TRIG 98     TSH:   Recent Labs   Lab 24  1055   TSH 1.807       Diagnostic Results:    Brain Imagin2024 MRI Brain pending    Vessel Imagin2024 CTA Stroke MP  - No acute intracranial process.  - No significant stenosis at the carotid bifurcations by NASCET criteria.  Mild areas of narrowing of the vertebral arteries bilaterally.  - No major branch advanced stenosis/occlusion at the iyguyn-vb-Vbyrvx.    Cardiac Evaluation:   2024 EKG  Normal sinus rhythm   Left bundle branch block   Abnormal ECG     2024 TTE    Left Ventricle: The left ventricle is normal in size. Mild septal thickening. There is concentric remodeling. Regional wall motion abnormalities present. Septal motion is abnormal. There is normal systolic function with a visually estimated ejection fraction of 55 - 60%. Ejection fraction by visual approximation is 58%. There is indeterminate diastolic function.    Right Ventricle: Normal right ventricular cavity size. Wall thickness is normal. Systolic function is normal.    Aortic Valve: The aortic valve is a trileaflet valve. There is mild aortic valve sclerosis. There is mild annular calcification present. There is mild aortic regurgitation.    Tricuspid Valve: There is mild regurgitation.    Pulmonary  Artery: The estimated pulmonary artery systolic pressure is 21 mmHg.    IVC/SVC: Normal venous pressure at 3 mmHg.

## 2024-07-23 NOTE — ED NOTES
MRI ready for pt. Patient stating she cannot tolerate MRI due to claustrophobia even with medication. MD notified.

## 2024-07-23 NOTE — HPI
Nesha Landa is an 79 yo F with PMH of HTN, HLD, and CAD who presented to the ED with new right-sided weakness and numbness, a Stroke Code was activated upon her arrival. She states that when she woke up at 3:00AM this morning, she noticed that she was her right-side felt numb and heavy and she was unable to move her right arm and leg - she reports that she used her left arm to  her right arm. Her symptoms improved a bit around 6:00AM, but never fully resolved. She called her Cardiologist's office who told her to go to the ED for further evaluation.

## 2024-07-23 NOTE — TELEPHONE ENCOUNTER
Pt calling to report numbness in right arm in leg for approximately 2 hours this morning beginning at 3am. Pt states she is feeling better now, able to use arm and leg but both feel weak.  notified, advised pt proceed to ER for evaluation. Pt notified, verbalized understanding.

## 2024-07-23 NOTE — ASSESSMENT & PLAN NOTE
77 yo F with PMH of HTN, HLD, and CAD who presented to the ED with new right-sided weakness and numbness starting at 3:00AM (she woke up not being able to move her right side at all, gradually improved at 6:00AM but never returned to baseline.) NIHSS on my exam was 1 for subtle RUE drift. CTA MP negative for LVO or high-grade stenosis. Remain highly suspicious for acute infarct.    Recommendations:  - STAT MRI Brain without contrast  - Loading doses of ASA and Plavix  - NS 1 L bolus + infusion @ 100 cc/hr  - HOB flat    We will follow-up imaging. Please reach out to us with any questions or for new changes in neuro exam.    Doxycycline Counseling:  Patient counseled regarding possible photosensitivity and increased risk for sunburn.  Patient instructed to avoid sunlight, if possible.  When exposed to sunlight, patients should wear protective clothing, sunglasses, and sunscreen.  The patient was instructed to call the office immediately if the following severe adverse effects occur:  hearing changes, easy bruising/bleeding, severe headache, or vision changes.  The patient verbalized understanding of the proper use and possible adverse effects of doxycycline.  All of the patient's questions and concerns were addressed.

## 2024-07-23 NOTE — ED PROVIDER NOTES
Encounter Date: 2024       History     Chief Complaint   Patient presents with    Extremity Weakness     At 3 am, r arm and leg got weak and heavy, r leg drift, spoke with dr you and instructed to call stroke code     78 year old female with history of HTN, HLD, arthritis, LBBB, and GERD who presents to the ED for chief complaint of right sided extremity weakness.  Patient woke up around 3 am and states that she was not able to move her right arm and leg.  She had difficulty going to the bathroom.  Around 6 am, patient states she had mild improvement of her right sided weakness but still did not return to baseline.  Denies any fevers, chest pain, SOB, abdominal pain, nausea, vomiting, or changes in urination.    The history is provided by the patient. No  was used.     Review of patient's allergies indicates:   Allergen Reactions    Ciprofloxacin Nausea Only    Sulfa (sulfonamide antibiotics)     Metronidazole Nausea Only and Nausea And Vomiting     Past Medical History:   Diagnosis Date    Arthritis     Coronary arteriosclerosis     HTN (hypertension)     Hypercholesteremia     LBBB (left bundle branch block)     Osteopenia      Past Surgical History:   Procedure Laterality Date    CATARACT EXTRACTION      10 years+ OU    HYSTERECTOMY      partial colectomy       Family History   Problem Relation Name Age of Onset    Gallbladder disease Mother      Heart disease Mother      Coronary artery disease Father          MI -     Heart disease Father      Heart disease Sister tung     Hyperlipidemia Sister tung     No Known Problems Sister      Colon cancer Maternal Grandmother      Diabetes Maternal Grandmother      Diabetes Daughter      COPD Daughter      Diabetes Daughter      Drug abuse Daughter      No Known Problems Son      No Known Problems Son      No Known Problems Son      Amblyopia Neg Hx      Blindness Neg Hx      Cancer Neg Hx      Cataracts Neg Hx      Glaucoma Neg Hx       Hypertension Neg Hx      Macular degeneration Neg Hx      Retinal detachment Neg Hx      Strabismus Neg Hx      Stroke Neg Hx      Thyroid disease Neg Hx       Social History     Tobacco Use    Smoking status: Never    Smokeless tobacco: Never   Substance Use Topics    Alcohol use: No    Drug use: No     Review of Systems  All other systems reviewed and were negative; see HPI also for additional ROS.    Physical Exam     Initial Vitals [07/23/24 0956]   BP Pulse Resp Temp SpO2   (!) 152/80 67 20 98.8 °F (37.1 °C) 97 %      MAP       --         Physical Exam    Nursing note and vitals reviewed.  Constitutional: No distress.   Patient is laying in bed in no apparent distress.   HENT:   Head: Normocephalic.   Eyes: Conjunctivae and EOM are normal. No scleral icterus.   Neck: Neck supple.   Normal range of motion.  Cardiovascular:  Normal rate, regular rhythm and normal heart sounds.           Pulmonary/Chest: Breath sounds normal. No respiratory distress. She has no wheezes. She has no rhonchi. She has no rales. She exhibits no tenderness.   Abdominal: Abdomen is soft. She exhibits no distension and no mass. There is no abdominal tenderness. There is no rebound and no guarding.   Musculoskeletal:         General: Normal range of motion.      Cervical back: Normal range of motion and neck supple.     Neurological: She is alert. No sensory deficit.   Decreased motor strength in RUE and RLE.  Light touch sensation is intact in all extremities.  No facial droop.   Skin: Skin is warm. Capillary refill takes less than 2 seconds.   Psychiatric: She has a normal mood and affect.         ED Course   Critical Care    Date/Time: 7/23/2024 1:45 PM    Performed by: Asaf Lawrence DO  Authorized by: Asaf Lawrence DO  Direct patient critical care time: 12 minutes  Additional history critical care time: 12 minutes  Ordering / reviewing critical care time: 25 minutes  Documentation critical care time: 8 minutes  Consulting  other physicians critical care time: 10 minutes  Total critical care time (exclusive of procedural time) : 67 minutes  Critical care was necessary to treat or prevent imminent or life-threatening deterioration of the following conditions: CNS failure or compromise.  Critical care was time spent personally by me on the following activities: development of treatment plan with patient or surrogate, blood draw for specimens, discussions with consultants, evaluation of patient's response to treatment, examination of patient, obtaining history from patient or surrogate, ordering and performing treatments and interventions, ordering and review of laboratory studies, ordering and review of radiographic studies, pulse oximetry, re-evaluation of patient's condition and review of old charts.        Labs Reviewed   CBC W/ AUTO DIFFERENTIAL - Abnormal       Result Value    WBC 5.63      RBC 4.04      Hemoglobin 12.6      Hematocrit 37.9      MCV 94      MCH 31.2 (*)     MCHC 33.2      RDW 12.6      Platelets 190      MPV 11.1      Immature Granulocytes 0.2      Gran # (ANC) 4.0      Immature Grans (Abs) 0.01      Lymph # 1.0      Mono # 0.5      Eos # 0.1      Baso # 0.02      nRBC 0      Gran % 70.8      Lymph % 18.1      Mono % 9.1      Eosinophil % 1.4      Basophil % 0.4      Differential Method Automated     COMPREHENSIVE METABOLIC PANEL - Abnormal    Sodium 135 (*)     Potassium 3.3 (*)     Chloride 104      CO2 25      Glucose 110      BUN 9      Creatinine 0.8      Calcium 9.0      Total Protein 6.2      Albumin 3.2 (*)     Total Bilirubin 0.6      Alkaline Phosphatase 58      AST 20      ALT 14      eGFR >60.0      Anion Gap 6 (*)    LIPID PANEL - Abnormal    Cholesterol 149      Triglycerides 98      HDL 72      LDL Cholesterol 57.4 (*)     HDL/Cholesterol Ratio 48.3      Total Cholesterol/HDL Ratio 2.1      Non-HDL Cholesterol 77     POCT GLUCOSE - Abnormal    POCT Glucose 132 (*)    PROTIME-INR    Prothrombin Time  11.3      INR 1.0     TSH    TSH 1.807     TROPONIN I    Troponin I <0.006     B-TYPE NATRIURETIC PEPTIDE    BNP 94     ISTAT PROCEDURE    POC PTWBT 13.6      POC PTINR 1.1      Sample unknown     ISTAT CREATININE    POC Creatinine 0.7      Sample unknown       EKG Readings: (Independently Interpreted)   Initial Reading: No STEMI. Previous EKG: Compared with most recent EKG Rhythm: Normal Sinus Rhythm. Heart Rate: 61. Conduction: LBBB. ST Segments: Non-Specific ST Segment Depression.     ECG Results              ECG 12 lead (Final result)        Collection Time Result Time QRS Duration OHS QTC Calculation    07/23/24 10:45:26 07/23/24 11:50:46 152 428                     Final result by Interface, Lab In [facility] (07/23/24 11:50:51)                   Narrative:    Test Reason : R29.818,    Vent. Rate : 061 BPM     Atrial Rate : 061 BPM     P-R Int : 158 ms          QRS Dur : 152 ms      QT Int : 426 ms       P-R-T Axes : 044 -29 140 degrees     QTc Int : 428 ms    Normal sinus rhythm  Left bundle branch block  Abnormal ECG  When compared with ECG of 14-MAY-2024 09:02,  T wave amplitude has decreased in Inferior leads  T wave inversion more evident in Lateral leads  QT has shortened  Confirmed by Tawanda STREET MD ([id number]) on 7/23/2024 11:50:45 AM    Referred By: [email or web address]   SELF           Confirmed By:Tawanda STREET MD                                  Imaging Results              MRI Brain Without Contrast (Edited Result - FINAL)  Result time 07/23/24 15:49:32      Addendum (preliminary) 1 of 1 by Rai Justice MD (07/23/24 15:49:32)      These findings were discussed with Dr. Calle by Dr. Ruelas at 15:42 on 07/23/2024.      Electronically signed by: Rai Justice  Date:    07/23/2024  Time:    15:49                 Final result by Rai Justice MD (07/23/24 15:42:38)                   Impression:      Findings most consistent with an acute subcentimeter deep white matter infarct on the left without intracranial  hemorrhage or mass effect.    Electronically signed by resident: Rai Ruelas  Date:    07/23/2024  Time:    15:28    Electronically signed by: Rai Justice  Date:    07/23/2024  Time:    15:42               Narrative:    EXAMINATION:  MRI BRAIN WITHOUT CONTRAST    CLINICAL HISTORY:  Stroke, follow up;    TECHNIQUE:  Multiplanar multisequence MR imaging of the brain was performed without intravenous contrast.    COMPARISON:  CT/CTA 07/23/2024    FINDINGS:  Small subcentimeter focus of diffusion restriction within the left deep white matter suspicious for an acute infarct.  No intracranial hemorrhage or mass effect.    Generalized cerebral volume loss.  Compensatory ventricular enlargement, without evidence of hydrocephalus.    A small chronic deep white matter infarct near the caudothalamic region is identified on the left.  The brain parenchyma otherwise maintains normal signal intensity other than for a few tiny nonspecific white matter lesions.    No extra-axial blood or fluid collections.    Normal vascular flow voids are preserved.    The visualized sinuses and mastoid air cells are clear.    Bone marrow signal intensity is unremarkable.                                       X-Ray Chest AP Portable (Final result)  Result time 07/23/24 11:55:34      Final result by Schuyler Ansari MD (07/23/24 11:55:34)                   Impression:      No significant intrathoracic abnormality.  No significant detrimental interval change in the appearance of the chest since 12/10/2008 is appreciated.      Electronically signed by: Schuyler Ansari MD  Date:    07/23/2024  Time:    11:55               Narrative:    EXAMINATION:  XR CHEST AP PORTABLE    CLINICAL HISTORY:  weakness;    TECHNIQUE:  One view    COMPARISON:  Comparison is made to the most recent available chest radiograph, dated 12/10/2008.    FINDINGS:  Heart size is normal, as is the appearance of the pulmonary vascularity.  Lung zones are clear, and are free of  significant airspace consolidation or volume loss.  No pleural fluid.  No hilar or mediastinal mass lesion, with some prominent tortuosity of the thoracic aorta noted.  No pneumothorax.                                       CTA STROKE MULTI-PHASE (Final result)  Result time 07/23/24 10:50:57      Final result by Rai Justice MD (07/23/24 10:50:57)                   Impression:      No acute intracranial process.    No significant stenosis at the carotid bifurcations by NASCET criteria.  Mild areas of narrowing of the vertebral arteries bilaterally.    No major branch advanced stenosis/occlusion at the [id number].      Electronically signed by: Rai Justice  Date:    07/23/2024  Time:    10:50               Narrative:    EXAMINATION:  CTA STROKE MULTI-PHASE    CLINICAL HISTORY:  Neuro deficit, acute, stroke suspected;    TECHNIQUE:  Non contrast low dose axial images were obtained thought the head. CT angiogram was performed from the level of the ibis to the top of the head following the IV administration of 100mL of Omnipaque 350.   Sagittal and coronal reconstructions and maximum intensity projection reconstructions were performed. Arterial stenosis percentages are based on NASCET measurement criteria.  Two additional phases of immediate post-contrast CTA images were performed through the head alone.    3D reformats were created on an independent workstation to evaluate the [id number].    COMPARISON:  09/03/2022    FINDINGS:  Brain:    There is no evidence of hydrocephalus mass effect intracranial hemorrhage or acute territorial infarct.    CTA:    There is no advanced stenosis at the origins of the vessels from the aortic arch.    Mild stenosis at the origin of the non dominant right vertebral artery.  Mild irregularity of the left cervical vertebral artery presumably reflecting mild atherosclerotic narrowing versus an element of fibromuscular dysplasia, similar to the prior study..  No  advanced stenosis of the vertebral arteries in the neck.    There is no significant stenosis at the carotid bifurcations by NASCET criteria with mild calcifications noted..    Intracranially there is no major branch advanced stenosis/occlusion at the [place].    No aneurysm. The venous sinuses are patent.    No soft tissue mass is identified in the neck.  Mild fluid within the pericardial recess.    These findings were communicated to Dr. Krishnamurthy at 10:47 on 07/23/2024.                                    X-Rays:   Independently Interpreted Readings:   Chest X-Ray: Normal heart size.  No infiltrates.  No acute abnormalities. No pneumothorax or free air     Medications   0.9%  NaCl infusion (0 mL/hr Intravenous Stopped 7/23/24 2243)   iohexoL (OMNIPAQUE 350) injection 100 mL (100 mLs Intravenous Given 7/23/24 1018)   sodium chloride 0.9% bolus 1,000 mL 1,000 mL (0 mLs Intravenous Stopped 7/23/24 1300)   aspirin tablet 325 mg (325 mg Oral Given 7/23/24 1124)   clopidogreL tablet 300 mg (300 mg Oral Given 7/23/24 1124)   LORazepam injection 1 mg (1 mg Intravenous Given 7/23/24 1458)     Medical Decision Making  78 year old female who present with right sided weakness.  She is hypertensive, other vitals WNL.  On exam, she has RUE and RLE weakness.  Sensation is intact in all extremities.  Please see physical exam findings above for additional details.  Differential diagnoses include but are not limited to stroke, ICH, hypoglycemia, electrolyte abnormality, thyroid disease, ACS, CHF.  Stroke code was activated by Dr. Blakely.  Ordered labs, CXR, and CTA stroke multiphase study.  Please see ED course for additional details.    Patient will be admitted to the hospital by the vascular stroke neurology team in the setting of her acute left deep white matter infarct.    Amount and/or Complexity of Data Reviewed  Labs: ordered.  Radiology: ordered. Decision-making details documented in ED Course.  ECG/medicine  tests: ordered and independent interpretation performed. Decision-making details documented in ED Course.    Risk  Prescription drug management.              Attending Attestation:   Physician Attestation Statement for Resident:  As the supervising MD   Physician Attestation Statement: I have personally seen and examined this patient.   I agree with the above history.  -:   As the supervising MD I agree with the above PE.     As the supervising MD I agree with the above treatment, course, plan, and disposition.                    I have reviewed and concur with the resident's history, physical, assessment, and plan.  I have personally interviewed and examined the patient at bedside.   I did supervise any and all procedures and was present for any critical portion, and was always immediately available for help and as a resource.     The above history physical, review of symptoms, HPI and physical exam reflect my independent interpretation and evaluation.    Briefly, 78-year-old female with a history of hypertension, hyperlipidemia and CAD presenting with new right-sided weakness and numbness with last well known approximately 3:00 a.m., she woke up not being able to move on her right side.  Some improvement but not back to baseline.  CTA negative for large vessel occlusion, MRI shows acute stroke.  Stroke code was initiated, labs reviewed, ECG nonischemic, chest x-ray without acute findings.  Discussed case with vascular Neurology, patient admitted for ongoing management of acute stroke.  Please see critical care note for critical care time.    Complexity: High Risk - critical care    Final diagnoses:  [R29.818] Acute focal neurological deficit  [R53.1] Acute right-sided weakness  [I63.9] Acute stroke due to ischemia (Primary)     Asaf Lawrence DO, FAAEM  Emergency Staff Physician   Dept of Emergency Medicine   [facility]  Spectralink: [id number]        Disclaimer: This note has been generated using  voice-recognition software. There may be typographical errors that have been missed during proof-reading.            ED Course as of 07/25/24 0913 Tue Jul 23, 2024   1058 EKG shows normal sinus rhythm, rate of 61 beats per minute, LBBB, and no STEMI. Prior EKG shows LBBB. [MD]   1100 CTA STROKE MULTI-PHASE  CTA shows no acute intracranial abnormalities.  No significant areas of stenosis or occlusion. [MD]   1201 X-Ray Chest AP Portable  CXR shows no acute cardiopulmonary abnormalities. [MD]   1234 Patient states she cannot have MRI even with medication due to claustrophobia. Stroke team states that they will talk with patient and family. [MD]   1330 Patient has agreed to get the MRI brain with medication.  Will order Ativan. [MD]   1547 MRI Brain Without Contrast  MRI shows an acute subcentimeter deep white matter infarct on the left without intracranial hemorrhage or mass effect.  Vascular Neurology team plans to admit patient to hospital. [MD]      ED Course User Index  [MD] Javier Gimenez MD                           Clinical Impression:  Final diagnoses:  [R29.818] Acute focal neurological deficit  [R53.1] Acute right-sided weakness  [I63.9] Acute stroke due to ischemia (Primary)          ED Disposition Condition    Observation                 Javier Gimenez MD  Resident  07/24/24 0957       Asaf Lawrence,   07/25/24 0913

## 2024-07-23 NOTE — ACP (ADVANCE CARE PLANNING)
Advance Care Planning     Date: 07/23/2024    Code Status  In light of the patients advanced and life limiting illness,I engaged the the patient in a voluntary conversation about the patient's preferences for care  at the very end of life. The patient wishes to have a natural, peaceful death.  Along those lines, the patient does not wish to have CPR or other invasive treatments performed when her heart and/or breathing stops. I communicated to the patient that a DNR order would be placed in her medical record to reflect this preference.    A total of 25 min was spent on advance care planning, goals of care discussion, emotional support, formulating and communicating prognosis and exploring burden/benefit of various approaches of treatment. This discussion occurred on a fully voluntary basis with the verbal consent of the patient and/or family.     Iram Calle MD  Neurology PGY-2  [facility]

## 2024-07-24 ENCOUNTER — TELEPHONE (OUTPATIENT)
Dept: INTERNAL MEDICINE | Facility: CLINIC | Age: 79
End: 2024-07-24
Payer: MEDICARE

## 2024-07-24 VITALS
WEIGHT: 132.94 LBS | TEMPERATURE: 97 F | RESPIRATION RATE: 18 BRPM | SYSTOLIC BLOOD PRESSURE: 179 MMHG | OXYGEN SATURATION: 95 % | HEART RATE: 71 BPM | DIASTOLIC BLOOD PRESSURE: 85 MMHG | HEIGHT: 64 IN | BODY MASS INDEX: 22.7 KG/M2

## 2024-07-24 PROBLEM — Z66 DNR (DO NOT RESUSCITATE): Status: ACTIVE | Noted: 2024-07-24

## 2024-07-24 PROBLEM — R20.0 SENSORY LOSS: Status: ACTIVE | Noted: 2024-07-24

## 2024-07-24 PROBLEM — G81.90 HEMIPARESIS: Status: ACTIVE | Noted: 2024-07-24

## 2024-07-24 PROBLEM — Z71.89 GOALS OF CARE, COUNSELING/DISCUSSION: Status: ACTIVE | Noted: 2023-08-16

## 2024-07-24 PROBLEM — Z74.09 REDUCED MOBILITY: Status: ACTIVE | Noted: 2024-07-24

## 2024-07-24 LAB
ALBUMIN SERPL BCP-MCNC: 3.2 G/DL (ref 3.5–5.2)
ALP SERPL-CCNC: 56 U/L (ref 55–135)
ALT SERPL W/O P-5'-P-CCNC: 13 U/L (ref 10–44)
ANION GAP SERPL CALC-SCNC: 9 MMOL/L (ref 8–16)
APTT PPP: 29.1 SEC (ref 21–32)
AST SERPL-CCNC: 19 U/L (ref 10–40)
BASOPHILS # BLD AUTO: 0.03 K/UL (ref 0–0.2)
BASOPHILS NFR BLD: 0.5 % (ref 0–1.9)
BILIRUB SERPL-MCNC: 0.6 MG/DL (ref 0.1–1)
BUN SERPL-MCNC: 6 MG/DL (ref 8–23)
CALCIUM SERPL-MCNC: 9.1 MG/DL (ref 8.7–10.5)
CHLORIDE SERPL-SCNC: 105 MMOL/L (ref 95–110)
CO2 SERPL-SCNC: 23 MMOL/L (ref 23–29)
CREAT SERPL-MCNC: 0.8 MG/DL (ref 0.5–1.4)
DIFFERENTIAL METHOD BLD: NORMAL
EOSINOPHIL # BLD AUTO: 0.1 K/UL (ref 0–0.5)
EOSINOPHIL NFR BLD: 1.6 % (ref 0–8)
ERYTHROCYTE [DISTWIDTH] IN BLOOD BY AUTOMATED COUNT: 12.8 % (ref 11.5–14.5)
EST. GFR  (NO RACE VARIABLE): >60 ML/MIN/1.73 M^2
GLUCOSE SERPL-MCNC: 94 MG/DL (ref 70–110)
HCT VFR BLD AUTO: 41.3 % (ref 37–48.5)
HGB BLD-MCNC: 13.2 G/DL (ref 12–16)
IMM GRANULOCYTES # BLD AUTO: 0.01 K/UL (ref 0–0.04)
IMM GRANULOCYTES NFR BLD AUTO: 0.2 % (ref 0–0.5)
INR PPP: 1 (ref 0.8–1.2)
LYMPHOCYTES # BLD AUTO: 1.4 K/UL (ref 1–4.8)
LYMPHOCYTES NFR BLD: 22.1 % (ref 18–48)
MAGNESIUM SERPL-MCNC: 1.8 MG/DL (ref 1.6–2.6)
MCH RBC QN AUTO: 30.9 PG (ref 27–31)
MCHC RBC AUTO-ENTMCNC: 32 G/DL (ref 32–36)
MCV RBC AUTO: 97 FL (ref 82–98)
MONOCYTES # BLD AUTO: 0.6 K/UL (ref 0.3–1)
MONOCYTES NFR BLD: 9.1 % (ref 4–15)
NEUTROPHILS # BLD AUTO: 4.3 K/UL (ref 1.8–7.7)
NEUTROPHILS NFR BLD: 66.5 % (ref 38–73)
NRBC BLD-RTO: 0 /100 WBC
PHOSPHATE SERPL-MCNC: 3 MG/DL (ref 2.7–4.5)
PLATELET # BLD AUTO: 219 K/UL (ref 150–450)
PMV BLD AUTO: 11.3 FL (ref 9.2–12.9)
POTASSIUM SERPL-SCNC: 3.8 MMOL/L (ref 3.5–5.1)
PROT SERPL-MCNC: 6.1 G/DL (ref 6–8.4)
PROTHROMBIN TIME: 11.4 SEC (ref 9–12.5)
RBC # BLD AUTO: 4.27 M/UL (ref 4–5.4)
SODIUM SERPL-SCNC: 137 MMOL/L (ref 136–145)
TROPONIN I SERPL DL<=0.01 NG/ML-MCNC: <0.006 NG/ML (ref 0–0.03)
WBC # BLD AUTO: 6.39 K/UL (ref 3.9–12.7)

## 2024-07-24 PROCEDURE — 97165 OT EVAL LOW COMPLEX 30 MIN: CPT | Mod: HCNC

## 2024-07-24 PROCEDURE — 85610 PROTHROMBIN TIME: CPT | Mod: HCNC

## 2024-07-24 PROCEDURE — 84100 ASSAY OF PHOSPHORUS: CPT | Mod: HCNC

## 2024-07-24 PROCEDURE — 92610 EVALUATE SWALLOWING FUNCTION: CPT | Mod: HCNC

## 2024-07-24 PROCEDURE — 63600175 PHARM REV CODE 636 W HCPCS: Mod: HCNC

## 2024-07-24 PROCEDURE — 97161 PT EVAL LOW COMPLEX 20 MIN: CPT | Mod: HCNC

## 2024-07-24 PROCEDURE — 80053 COMPREHEN METABOLIC PANEL: CPT | Mod: HCNC

## 2024-07-24 PROCEDURE — 83735 ASSAY OF MAGNESIUM: CPT | Mod: HCNC

## 2024-07-24 PROCEDURE — 97530 THERAPEUTIC ACTIVITIES: CPT | Mod: HCNC

## 2024-07-24 PROCEDURE — 85730 THROMBOPLASTIN TIME PARTIAL: CPT | Mod: HCNC

## 2024-07-24 PROCEDURE — 84484 ASSAY OF TROPONIN QUANT: CPT | Mod: HCNC

## 2024-07-24 PROCEDURE — 25000003 PHARM REV CODE 250: Mod: HCNC

## 2024-07-24 PROCEDURE — 96372 THER/PROPH/DIAG INJ SC/IM: CPT

## 2024-07-24 PROCEDURE — 92523 SPEECH SOUND LANG COMPREHEN: CPT | Mod: HCNC

## 2024-07-24 PROCEDURE — G0378 HOSPITAL OBSERVATION PER HR: HCPCS | Mod: HCNC

## 2024-07-24 PROCEDURE — 36415 COLL VENOUS BLD VENIPUNCTURE: CPT | Mod: HCNC

## 2024-07-24 PROCEDURE — 85025 COMPLETE CBC W/AUTO DIFF WBC: CPT | Mod: HCNC

## 2024-07-24 RX ORDER — CLOPIDOGREL BISULFATE 75 MG/1
75 TABLET ORAL DAILY
Qty: 20 TABLET | Refills: 0 | Status: SHIPPED | OUTPATIENT
Start: 2024-07-25 | End: 2025-08-13

## 2024-07-24 RX ORDER — AMLODIPINE BESYLATE 5 MG/1
5 TABLET ORAL DAILY
Status: DISCONTINUED | OUTPATIENT
Start: 2024-07-24 | End: 2024-07-24 | Stop reason: HOSPADM

## 2024-07-24 RX ADMIN — ASPIRIN 81 MG: 81 TABLET, COATED ORAL at 10:07

## 2024-07-24 RX ADMIN — CLOPIDOGREL BISULFATE 75 MG: 75 TABLET ORAL at 10:07

## 2024-07-24 RX ADMIN — ATORVASTATIN CALCIUM 80 MG: 40 TABLET, FILM COATED ORAL at 10:07

## 2024-07-24 RX ADMIN — HEPARIN SODIUM 5000 UNITS: 5000 INJECTION INTRAVENOUS; SUBCUTANEOUS at 05:07

## 2024-07-24 RX ADMIN — AMLODIPINE BESYLATE 5 MG: 5 TABLET ORAL at 12:07

## 2024-07-24 NOTE — PT/OT/SLP EVAL
Physical Therapy Evaluation and Discharge Note    Patient Name:  Nesha Landa   MRN:  [id number]    Recommendations:     Discharge Recommendations: No Therapy Indicated  Discharge Equipment Recommendations: none   Barriers to discharge: None    Assessment:     Nesha Landa is a 78 y.o. female admitted with a medical diagnosis of Cerebrovascular accident (CVA) due to thrombosis of left middle cerebral artery. .  At this time, patient is functioning at their prior level of function and does not require further acute PT services.     Recent Surgery: * No surgery found *      Plan:     During this hospitalization, patient does not require further acute PT services.  Please re-consult if situation changes.      Subjective     Chief Complaint: none  Patient/Family Comments/goals: pt reports her symptoms have resolved  Pain/Comfort:  Pain Rating 1: 0/10  Pain Rating Post-Intervention 1: 0/10    Patients cultural, spiritual, Shinto conflicts given the current situation: no    Living Environment:  Pt lives alone in a 2nd floor apartment w/ 8 [address] R (has landing platform retirement up, stairs are not steep).  Prior to admission, patients level of function was independent; enjoys An[employer]ism and silver sneakers at her [facility].  Equipment used at home: none.  DME owned (not currently used): none.  Upon discharge, patient will have assistance from son and niece.    Objective:     Communicated with RN prior to session.  Patient found HOB elevated with bed alarm, telemetry, PureWick, SCD upon PT entry to room.    General Precautions: Standard, aspiration, fall    Orthopedic Precautions:N/A   Braces: N/A  Respiratory Status: Room air    Exams:  Cognitive Exam:  Patient is oriented to Person, Place, Time, and Situation  Sensation:    -       Intact  RLE ROM: WFL  RLE Strength: WFL  LLE ROM: WFL  LLE Strength: WFL    Functional Mobility:  Bed Mobility:     Supine to Sit: independence  Sit to Supine: independence  Transfers:     Sit to Stand:   independence with no AD  Gait: 150' w/ independence using no AD; pt w/ mild dec rudi and dec arm swing 2/2 general stiffness from being in bed; also w/ inner arch collapse in B feet which is chronic  Stairs:  Pt ascended/descended 4 stair(s) with No Assistive Device with bilateral handrails with Modified Independent.   Tinetti Total Score: 27  < 18 = High Risk of Falls, 19-23 = Moderate Risk of Falls, > 24 = Low Risk of Falls    AM-PAC 6 CLICK MOBILITY  Total Score:24       Treatment and Education:  Pt educated on PT assessment and plan to d/c orders 2/2 no current needs for skilled PT. Pt instructed to slowly progress mobility each day towards PLOF without attempting to jump right into previous daily routine immediately upon d/c 2/2 general deconditioning from being in hospital. Pt in agreement w/ POC and verbalized understanding w/ plan to slowly progress to normal daily activities at home and inform MD team if pt experiences any difficulty progressing back to PLOF while here or upon d/c in order for PT to be re-consulted/orders placed for OP PT as needed.     AM-PAC 6 CLICK MOBILITY  Total Score:24     Patient left HOB elevated with all lines intact, call button in reach, and RN notified. Pt educated she can ambulate to/from bathroom independently, confirmed w/ RN OK to remove purewick per pt request since she is ambulating well. Pt instructed to sit up in chair during the day as able but at conclusion of PT session requested to return to bed for a bit to watch tv.    GOALS:   Multidisciplinary Problems       Physical Therapy Goals       Not on file                    History:     Past Medical History:   Diagnosis Date    Arthritis     Coronary arteriosclerosis     HTN (hypertension)     Hypercholesteremia     LBBB (left bundle branch block)     Osteopenia        Past Surgical History:   Procedure Laterality Date    CATARACT EXTRACTION      10 years+ OU    HYSTERECTOMY      partial colectomy         Time  Tracking:     PT Received On: 07/24/24  PT Start Time: 0843     PT Stop Time: 0902  PT Total Time (min): 19 min     Billable Minutes: Evaluation 10 and Therapeutic Activity 9      07/24/2024

## 2024-07-24 NOTE — ED NOTES
Assumed care. Pt c/o headache. No other complaints. Pt hypertensive but other VSS.      LOC: The patient is awake, alert and aware of environment with an appropriate affect, the patient is oriented x 3 and speaking appropriately.  APPEARANCE: Patient resting comfortably and in no acute distress, patient is clean and well groomed, patient's clothing is properly fastened.  SKIN: The skin is warm and dry, color consistent with ethnicity, patient has normal skin turgor and moist mucus membranes, skin intact, no breakdown or bruising noted.  MUSCULOSKELETAL: Patient moving all extremities spontaneously, no obvious swelling or deformities noted.  RESPIRATORY: Airway is open and patent, respirations are spontaneous, patient has a normal effort and rate, no accessory muscle use noted.  CARDIAC: Patient has a normal rate and regular rhythm, no periphreal edema noted, capillary refill < 3 seconds.  ABDOMEN: Soft and non tender to palpation, no distention noted, normoactive bowel sounds present in all four quadrants.  NEUROLOGIC:  facial expression is symmetrical, patient moving all extremities spontaneously, normal sensation in all extremities when touched with a finger.  Follows all commands appropriately.

## 2024-07-24 NOTE — SUBJECTIVE & OBJECTIVE
Neurologic Chief Complaint: left caudothalamic/periventricular infarct    Subjective:     Interval History: Patient is seen for follow-up neurological assessment and treatment recommendations: Seen and examined at bedside. Pt reports improving symptoms. Feels near baseline but still with some residual weakness. PT/OT/SLP recs no therapy.    HPI, Past Medical, Family, and Social History remains the same as documented in the initial encounter.     Review of Systems   Constitutional:  Negative for chills and fever.   HENT:  Negative for trouble swallowing.    Eyes:  Negative for visual disturbance.   Respiratory:  Negative for shortness of breath.    Cardiovascular:  Negative for chest pain and leg swelling.   Gastrointestinal:  Negative for abdominal pain, blood in stool, constipation, diarrhea, nausea and vomiting.   Genitourinary:  Negative for dysuria.   Neurological:  Positive for weakness. Negative for dizziness, facial asymmetry, light-headedness, numbness and headaches.   Psychiatric/Behavioral:  Negative for agitation.      Scheduled Meds:   aspirin  81 mg Oral Daily    atorvastatin  80 mg Oral Daily    clopidogreL  75 mg Oral Daily    heparin (porcine)  5,000 Units Subcutaneous Q8H     Continuous Infusions:  PRN Meds:  Current Facility-Administered Medications:     acetaminophen, 1,000 mg, Oral, Q6H PRN    bisacodyL, 10 mg, Rectal, Daily PRN    labetalol, 10 mg, Intravenous, Q15 Min PRN    melatonin, 6 mg, Oral, Nightly PRN    sodium chloride 0.9%, 500 mL, Intravenous, PRN    sodium chloride 0.9%, 10 mL, Intravenous, PRN    Objective:     Vital Signs (Most Recent):  Temp: 98.6 °F (37 °C) (07/24/24 0827)  Pulse: 61 (07/24/24 0827)  Resp: 16 (07/24/24 0827)  BP: (!) 168/78 (07/24/24 0827)  SpO2: 95 % (07/24/24 0827)  BP Location: Right arm    Vital Signs Range (Last 24H):  Temp:  [97.9 °F (36.6 °C)-98.6 °F (37 °C)]   Pulse:  [61-76]   Resp:  [16-18]   BP: (158-178)/(75-86)   SpO2:  [93 %-97 %]   BP Location:  Right arm       Physical Exam  Constitutional:       General: She is not in acute distress.  HENT:      Head: Normocephalic and atraumatic.      Mouth/Throat:      Mouth: Mucous membranes are moist.      Pharynx: Oropharynx is clear.   Eyes:      General: No scleral icterus.  Cardiovascular:      Rate and Rhythm: Normal rate and regular rhythm.      Pulses: Normal pulses.   Pulmonary:      Effort: Pulmonary effort is normal. No respiratory distress.   Abdominal:      General: Abdomen is flat. There is no distension.   Musculoskeletal:      Cervical back: Normal range of motion.      Right lower leg: No edema.      Left lower leg: No edema.   Skin:     General: Skin is warm and dry.   Neurological:      Mental Status: She is alert.      Comments: Please see neurological exam below   Psychiatric:         Behavior: Behavior normal.              Neurological Exam:   LOC: alert  Attention Span: Good   Language: No aphasia  Articulation: No dysarthria  Orientation: Person, Place, Time   Visual Fields: Full  EOM (CN III, IV, VI): Full/intact  Pupils (CN II, III): PERRL  Facial Sensation (CN V): Normal  Facial Movement (CN VII): Symmetric facial expression    Motor: Arm left  Normal 5/5  Leg left  Normal 5/5  Arm right  Paresis: 4/5  Leg right Paresis: 4/5  Cerebellum: No evidence of appendicular or axial ataxia  Sensation: Intact to light touch, temperature and vibration  Tone: Normal tone throughout    Laboratory:  CMP:   Recent Labs   Lab 07/24/24  0539   CALCIUM 9.1   ALBUMIN 3.2*   PROT 6.1      K 3.8   CO2 23      BUN 6*   CREATININE 0.8   ALKPHOS 56   ALT 13   AST 19   BILITOT 0.6     CBC:   Recent Labs   Lab 07/24/24  0539   WBC 6.39   RBC 4.27   HGB 13.2   HCT 41.3      MCV 97   MCH 30.9   MCHC 32.0     Lipid Panel:   Recent Labs   Lab 07/23/24  1055   CHOL 149   LDLCALC 57.4*   HDL 72   TRIG 98     TSH:   Recent Labs   Lab 07/23/24  1055   TSH 1.807       Diagnostic Results   Brain  Imagin2024 MRI Brain   Impression:  Findings most consistent with an acute subcentimeter deep white matter infarct on the left without intracranial hemorrhage or mass effect.     Vessel Imagin2024 CTA Stroke MP  - No acute intracranial process.  - No significant stenosis at the carotid bifurcations by NASCET criteria.  Mild areas of narrowing of the vertebral arteries bilaterally.  - No major branch advanced stenosis/occlusion at the [id number].     Cardiac Evaluation:   2024 EKG  Normal sinus rhythm   Left bundle branch block   Abnormal ECG      2024 TTE    Left Ventricle: The left ventricle is normal in size. Mild septal thickening. There is concentric remodeling. Regional wall motion abnormalities present. Septal motion is abnormal. There is normal systolic function with a visually estimated ejection fraction of 55 - 60%. Ejection fraction by visual approximation is 58%. There is indeterminate diastolic function.    Right Ventricle: Normal right ventricular cavity size. Wall thickness is normal. Systolic function is normal.    Aortic Valve: The aortic valve is a trileaflet valve. There is mild aortic valve sclerosis. There is mild annular calcification present. There is mild aortic regurgitation.    Tricuspid Valve: There is mild regurgitation.    Pulmonary Artery: The estimated pulmonary artery systolic pressure is 21 mmHg.    IVC/SVC: Normal venous pressure at 3 mmHg.

## 2024-07-24 NOTE — CONSULTS
"  [facility] - Neurosurgery ([facility])  Adult Nutrition  Consult Note    SUMMARY     Recommendations  1) Cardiac diet (low fat/sodium) may be therapeutic; recommend modify diet order as medically feasible.    2) RD following    Goals: Meet % EEN/ ENP by RD f/u date  Nutrition Goal Status: new  Communication of RD Recs: POC    Assessment and Plan  Nutrition Problem  Food and nutrition knowledge deficit     Related to (etiology):   lack of prior nutrition education regarding cardiac/ low diet      Signs and Symptoms (as evidenced by):   excessive consumption of high sodium foods      Interventions/Recommendations (treatment strategy):  Collaboration of nutrition care w/ other providers  Content related nutrition education     Reason for Assessment    Reason For Assessment: consult  Diagnosis: stroke/CVA  Relevant Medical History: Arthritis; Hypercholesteromia; Osteopenia; HTN  Interdisciplinary Rounds: did not attend  General Information Comments: Pt alert in bedside chair during visit. SLP advanced diet to regular w/ thin liquids on 7/24. Pt denies difficulty chewing/ swallowing or known food allergies at this time. Pt reports avoiding dairy products (lactose intolerant). Pt expressed a "fair" appetite with a reported 75% PO intake of today's breakfast tray (grits, sausage, and muffin). Noted a 4.4% (6 lbs.) weight loss within the past 4 months per EMR. Wt hx per  lbs. (7/23); 139 lbs. (5/24); 138 lbs. (3/19). She reports a UBW of 136 lbs.   Heart healthy diet eduction completed w/ pt. Discussed importance of a low sodium diet. Reviewed high sodium foods that should be avoided. Food labels, salt free seasonings, and recommended sodium intake reviewed. Encouraged healthy, fresh foods that are low in sodium that are good for consumption. Visual NFPE completed, 7/4 pt w/ no indicators for malnutrition at this time. RD following.  Nutrition Discharge Planning: Cardiac/ low sodium diet upon " "discharge    Nutrition Risk Screen    Nutrition Risk Screen: no indicators present    Nutrition/Diet History    Patient Reported Diet/Restrictions/Preferences: lactose restricted  Spiritual, Cultural Beliefs, Yazidi Practices, Values that Affect Care: no  Food Allergies: NKFA    Anthropometrics    Temp: 97.1 °F (36.2 °C)  Height Method: Stated  Height: 5' 4" (162.6 cm)  Height (inches): 64 in  Weight Method: Bed Scale  Weight: 60.3 kg (132 lb 15 oz)  Weight (lb): 132.94 lb  Ideal Body Weight (IBW), Female: 120 lb  % Ideal Body Weight, Female (lb): 110.78 %  BMI (Calculated): 22.8  BMI Grade: 18.5-24.9 - normal  Usual Body Weight (UBW), k.8 kg  % Usual Body Weight: 97.78  % Weight Change From Usual Weight: -2.43 %    Lab/Procedures/Meds    Pertinent Labs Reviewed: reviewed  Pertinent Labs Comments: BUN 6; Albumin 3.2  Pertinent Medications Reviewed: reviewed  Pertinent Medications Comments: Atorvastatin; Clopidogrel    Estimated/Assessed Needs    Weight Used For Calorie Calculations: 60.3 kg (132 lb 15 oz)  Energy Calorie Requirements (kcal): 1323 (PAL 1.25)  Energy Need Method: [place]  Protein Requirements: 60.3 - 72.3 g/day (1.0 - 1.2 g/kg)  Weight Used For Protein Calculations: 60.3 kg (132 lb 15 oz)  Fluid Requirements (mL): 1 mL/ kcal or per MD  Estimated Fluid Requirement Method: RDA Method  RDA Method (mL): 1323    Nutrition Prescription Ordered    Current Diet Order: Regular Cardiac Diet    Evaluation of Received Nutrient/Fluid Intake    I/O: +600 mL - since admit  Energy Calories Required: meeting needs  Protein Required: meeting needs  Fluid Required: meeting needs  Comments: BM- WNL  Tolerance: tolerating  % Intake of Estimated Energy Needs: 50 - 75 %  % Meal Intake: 50 - 75 %    Nutrition Risk    Level of Risk/Frequency of Follow-up: low - moderate (1-2x/ week)     Monitor and Evaluation    Food and Nutrient Intake: energy intake, food and beverage intake  Food and Nutrient Adminstration: " diet order  Knowledge/Beliefs/Attitudes: food and nutrition knowledge/skill, beliefs and attitudes  Physical Activity and Function: nutrition-related ADLs and IADLs, factors affecting access to physical activity  Anthropometric Measurements: height/length, weight, weight change, body mass index  Biochemical Data, Medical Tests and Procedures: electrolyte and renal panel, gastrointestinal profile, glucose/endocrine profile, inflammatory profile, lipid profile  Nutrition-Focused Physical Findings: overall appearance, extremities, muscles and bones, head and eyes, skin     Nutrition Follow-Up    RD Follow-up?: Yes  JI Boucher

## 2024-07-24 NOTE — PT/OT/SLP EVAL
"Speech Language Pathology Evaluation  Cognitive/Bedside Swallow    Patient Name:  Nesha Landa   MRN:  [id number]  Admitting Diagnosis: Cerebrovascular accident (CVA) due to thrombosis of left middle cerebral artery    Recommendations:                  General Recommendations:  Follow-up not indicated  Diet recommendations:  Regular, Thin   Aspiration Precautions: HOB to 90 degrees, Meds whole 1 at a time, and Standard aspiration precautions   General Precautions: Standard, fall, aspiration  Communication strategies:  none    Assessment:     Nesha Landa is a 78 y.o. female with speech language and cognitive skills wfl.  Oral and pharyngeal phases of swallow were wnl  History:     Past Medical History:   Diagnosis Date    Arthritis     Coronary arteriosclerosis     HTN (hypertension)     Hypercholesteremia     LBBB (left bundle branch block)     Osteopenia        Past Surgical History:   Procedure Laterality Date    CATARACT EXTRACTION      10 years+ OU    HYSTERECTOMY      partial colectomy         Social History: Patient lives with self .        Prior diet: regular.    Occupation/hobbies/homemaking: retired.    Subjective     "I am doing okay" per pt  Patient goals: home     Pain/Comfort:  Pain Rating 1: 0/10  Pain Rating Post-Intervention 1: 0/10    Respiratory Status: Room air    Objective:     Cognitive Status:    Pt. was oriented x3 with good recall of recent and remote temporal and general information.  Immediate/delayed verbal recall was wfl with pt. Repeating 7 digits and 5 words without difficulty.  Responses to hypothetical verbal problem solving tasks were accurate and complete.  Pt. Compared and contrasted objects and generated multiple solutions to problems.  Thought organization and categorization skills were wfl with pt. Naming 14 animals given one minute when 15-20 are wnl.  Pt. Responded to functional math and time calculations with 80% accuracy and sequenced 4 words presented auditorily on 2/2 " trials.        Receptive Language:   Comprehension:   Pt. Responded to complex yes/no questions and multi step commands with 100% accuracy and no delays in responding.        Pragmatics:    wfl    Expressive Language:  Verbal:    Verbal language skills were wfl with no evidence of aphasia.  Pt. Expressed their thoughts coherently in conversation with no evidence of confusion or word finding deficits        Motor Speech:  wnl    Voice:   wnl    Visual-Spatial:  wfl    Reading:   Paragraph wfl      Written Expression:   WFL    Oral Musculature Evaluation  Oral Musculature: WFL  Dentition: present and adequate  Secretion Management: adequate  Mucosal Quality: good  Mandibular Strength and Mobility: WFL  Oral Labial Strength and Mobility: WFL  Lingual Strength and Mobility: WFL  Velar Elevation: WFL  Buccal Strength and Mobility: WFL  Volitional Cough: strong  Volitional Swallow: no delay  Voice Prior to PO Intake: wf    Bedside Swallow Eval:   Consistencies Assessed:  Thin liquids self fed 4 ounces of water via cup with a straw  Solids self fed one cracker      Oral Phase:   WNL    Pharyngeal Phase:   no overt clinical signs/symptoms of aspiration  no overt clinical signs/symptoms of pharyngeal dysphagia    Compensatory Strategies  None    Treatment: education provided re role of slp and plan of care    Goals:   Multidisciplinary Problems       SLP Goals       Not on file                    Plan:     Patient to be seen:      Plan of Care expires:     Plan of Care reviewed with:  patient   SLP Follow-Up:  No       Discharge recommendations:  Therapy Intensity Recommendations at Discharge: No Therapy Indicated   Barriers to Discharge:  None    Time Tracking:     SLP Treatment Date:   07/24/24  Speech Start Time:  0805  Speech Stop Time:  0818     Speech Total Time (min):  13 min    Billable Minutes: Eval 8  and Eval Swallow and Oral Function 5    07/24/2024

## 2024-07-24 NOTE — PLAN OF CARE
Recommendations  1) Cardiac diet (low fat/sodium) may be therapeutic; recommend modify diet order as medically feasible.    2) RD following    Goals: Meet % EEN/ ENP by RD f/u date  Nutrition Goal Status: new  Communication of RD Recs: POC

## 2024-07-24 NOTE — PT/OT/SLP EVAL
"Occupational Therapy   Evaluation and Discharge Note    Name: Nesha Landa  MRN: [id number]  Admitting Diagnosis: Cerebrovascular accident (CVA) due to thrombosis of left middle cerebral artery  Recent Surgery: * No surgery found *      Recommendations:     Discharge Recommendations: No Therapy Indicated  Discharge Equipment Recommendations: none  Barriers to discharge:  None    Assessment:     Nesha Landa is a 78 y.o. female with a medical diagnosis of Cerebrovascular accident (CVA) due to thrombosis of left middle cerebral artery. At this time, patient is functioning at their prior level of function and does not require further acute OT services.     Plan:     During this hospitalization, patient does not require further acute OT services.  Please re-consult if situation changes.    Plan of Care Reviewed with: patient    Subjective   Patient:  "I noticed my right arm and leg had gone numb and heavy; I feel strong now."    Occupational Profile:  Per patient:  Patient resides in [place] alone in 2nd floor apt.  Patient is right handed.  PTA patient independent with ADLs including driving.  Hobbies: exercising and TV.  Currently owns no DME.  Retired: Customer retention; [employer]       Pain/Comfort:  Pain Rating 1: 0/10  Pain Rating Post-Intervention 1: 0/10    Patients cultural, spiritual, Scientologist conflicts given the current situation: no    Objective:     Communicated with: Nurse prior to session.  Patient found supine with bed alarm, telemetry, peripheral IV, PureWick, pulse ox (continuous), SCD upon OT entry to room.    General Precautions: Standard, aspiration, fall; approved for OOB activity to complete eval by NP (Kristian)  Orthopedic Precautions: N/A  Braces: N/A  Respiratory Status: Room air     Occupational Performance:    Bed Mobility:    Patient completed Rolling/Turning to Left with  modified independence  Patient completed Rolling/Turning to Right with modified independence  Patient completed Supine to " Sit with modified independence  Patient completed Sit to Supine with modified independence    Functional Mobility/Transfers:  Modified independent with stand pivot transfers    Activities of Daily Living:  Grooming: modified independence    Upper Body Dressing: modified independence    Lower Body Dressing: modified independence    Toileting:  modified independence with use of std commode    Cognitive/Visual Perceptual:  Cognitive/Psychosocial Skills:     -       Oriented to: Person, Place, Time, and Situation   -       Follows Commands/attention:Follows one-step commands  -       Communication: clear/fluent    Physical Exam:  Postural examination/scapula alignment:    -       Rounded shoulders  Skin integrity: Visible skin intact  Edema:  None noted  Upper Extremity Range of Motion:     -       Right Upper Extremity: WNL  -       Left Upper Extremity: WNL  Upper Extremity Strength:    -       Right Upper Extremity: WNL  -       Left Upper Extremity: WNL    AMPAC 6 Click ADL:  AMPAC Total Score: 24    Treatment & Education:  Patient education provided for stroke warning signs, prevention guidelines and personal risk factors.  Patient verbalizing understanding via teach back method.  Patient education provided on role of OT and need for no further OT upon discharge. Session was conducted separately from PT session to give more opportunities to mobilize throughout the day.     Patient left supine with all lines intact, call button in reach, and bed alarm on    GOALS:   Goals not set 2* no further OT needed.   Multidisciplinary Problems (Resolved)          Problem: Occupational Therapy    Goal Priority Disciplines Outcome Interventions   Occupational Therapy Goal   (Resolved)     OT, PT/OT Met                        History:     Past Medical History:   Diagnosis Date    Arthritis     Coronary arteriosclerosis     HTN (hypertension)     Hypercholesteremia     LBBB (left bundle branch block)     Osteopenia          Past  Surgical History:   Procedure Laterality Date    CATARACT EXTRACTION      10 years+ OU    HYSTERECTOMY      partial colectomy         Time Tracking:     OT Date of Treatment: 07/24/24  OT Start Time: 0628  OT Stop Time: 0648  OT Total Time (min): 20 min    Billable Minutes:Evaluation 10  Therapeutic Activity 10    7/24/2024

## 2024-07-24 NOTE — ASSESSMENT & PLAN NOTE
77 yo F with PMH of HTN, HLD, and CAD who presented to the ED with new right-sided weakness and numbness starting at 3:00AM (she woke up not being able to move her right side at all, gradually improved at 6:00AM but never returned to baseline.) NIHSS 1 on initial evaluation for subtle RUE drift. CTA MP negative for LVO or high-grade stenosis. MRI confirms acute left subcentimeter deep white matter infarct.    Recommendations:  - loaded with ASA and plavix. Continue DAPT 21 days followed by ASA monotherapy  - Continue statin  - CTH, CTA, MRI, and recent TTE reviewed  - seen by PT/OT/SLP who recommends no therapy  - dispo: home with outpt vascular neurology follow up

## 2024-07-24 NOTE — CARE UPDATE
I have reviewed the chart of Nesha Landa who is hospitalized for the following:    Active Hospital Problems    Diagnosis    *Cerebrovascular accident (CVA) due to thrombosis of left middle cerebral artery    Hemiparesis     Due to stroke  Therapy to evaluate and treat       Reduced mobility    DNR (do not resuscitate)    Sensory loss    Goals of care, counseling/discussion    Insomnia    Coronary artery disease due to calcified coronary lesion     Abnormal CACS 621 in 12/09      Essential hypertension    Hypercholesteremia    LBBB (left bundle branch block)        Daxa Herrera NP  [facility]

## 2024-07-24 NOTE — DISCHARGE SUMMARY
[facility] - Neurosurgery ([facility])  Vascular Neurology  Comprehensive Stroke Center  Discharge Summary     Summary:     Admit Date: 7/23/2024 10:07 AM    Discharge Date and Time:  07/24/2024 11:05 AM    Attending Physician: Kris Hernandez MD     Discharge Provider: Billy Silva MD    History of Present Illness: Nesha Landa is an 79 yo F with PMH of HTN, HLD, and CAD who presented to the ED with new right-sided weakness and numbness, a Stroke Code was activated upon her arrival. She states that when she woke up at 3:00AM this morning, she noticed that she was her right-side felt numb and heavy and she was unable to move her right arm and leg - she reports that she used her left arm to  her right arm. Her symptoms improved a bit around 6:00AM, but never fully resolved. She called her Cardiologist's office who told her to go to the ED for further evaluation.     Hospital Course (synopsis of major diagnoses, care, treatment, and services provided during the course of the hospital stay): Hospital Course:  78F w/ PMHx HTN, HLD, CAD, and GERD who [place] [facility] [place] right-sided weakness starting at 0300 7/23/24 upon waking. Initially not able to move right side at all, but gradually improved at 6AM but not back to baseline. NIHSS 1 for subtle RUE drift on initial stroke evaluation. CTA negative for LVO/high grade stenosis. Loaded with  and Plavix 300. MRI consistent with acute subcentimeter deep white matter infarct on left without hemorrhage/mass effect. Recent TTE 5/2024 EF 58% and +RWMA. PT/OT recommends no therapy. Pt with improving symptoms with minor weakness to right side. Will continue DAPT 21 days followed by ASA monotherapy. Plan for follow up in vascular neurology clinic. Pt to follow up with PCP for blood pressure management.    Goals of Care Treatment Preferences:  Code Status: DNR      Stroke Etiology: Ischemic Small Vessel Disease ([place])    STROKE DOCUMENTATION   Acute Stroke Times    Last Known Normal Date: 24  Last Known Normal Time: 300  Unknown Normal Time: Unknown Time  Symptom Onset Date: 24  Symptom Onset Time: 0300  Unknown Symptom Onset Time: Unknown Time  Stroke Team Called Date: 24  Stroke Team Called Time:   Stroke Team Arrival Date: 24  Stroke Team Arrival Time: 1007  CT Interpretation Time: 1024  Thrombolytic Therapy Recommended: No  CTA Interpretation Time: 1030  Thrombectomy Recommended: No     NIH Scale:  1a. Level of Consciousness: 0-->Alert, keenly responsive  1b. LOC Questions: 0-->Answers both questions correctly  1c. LOC Commands: 0-->Performs both tasks correctly  2. Best Gaze: 0-->Normal  3. Visual: 0-->No visual loss  4. Facial Palsy: 0-->Normal symmetrical movements  5a. Motor Arm, Left: 0-->No drift, limb holds 90 (or 45) degrees for full 10 secs  5b. Motor Arm, Right: 0-->No drift, limb holds 90 (or 45) degrees for full 10 secs  6a. Motor Leg, Left: 0-->No drift, leg holds 30 degree position for full 5 secs  6b. Motor Leg, Right: 0-->No drift, leg holds 30 degree position for full 5 secs  7. Limb Ataxia: 0-->Absent  8. Sensory: 0-->Normal, no sensory loss  9. Best Language: 0-->No aphasia, normal  10. Dysarthria: 0-->Normal  11. Extinction and Inattention (formerly Neglect): 0-->No abnormality  Total (NIH Stroke Scale): 0        Modified Yuridia Score: 1  Eddie Coma Scale:    ABCD2 Score:    [id number] Score:   HAS -BLED Score:   ICH Score:   Hunt & Lopez Classification:       Assessment/Plan:     Diagnostic Results:  Brain Imagin2024 MRI Brain   Impression:  Findings most consistent with an acute subcentimeter deep white matter infarct on the left without intracranial hemorrhage or mass effect.     Vessel Imagin2024 CTA Stroke MP  - No acute intracranial process.  - No significant stenosis at the carotid bifurcations by NASCET criteria.  Mild areas of narrowing of the vertebral arteries bilaterally.  - No major branch  advanced stenosis/occlusion at the ixywcf-oc-Ukymge.     Cardiac Evaluation:   7/23/2024 EKG  Normal sinus rhythm   Left bundle branch block   Abnormal ECG      5/31/2024 TTE    Left Ventricle: The left ventricle is normal in size. Mild septal thickening. There is concentric remodeling. Regional wall motion abnormalities present. Septal motion is abnormal. There is normal systolic function with a visually estimated ejection fraction of 55 - 60%. Ejection fraction by visual approximation is 58%. There is indeterminate diastolic function.    Right Ventricle: Normal right ventricular cavity size. Wall thickness is normal. Systolic function is normal.    Aortic Valve: The aortic valve is a trileaflet valve. There is mild aortic valve sclerosis. There is mild annular calcification present. There is mild aortic regurgitation.    Tricuspid Valve: There is mild regurgitation.    Pulmonary Artery: The estimated pulmonary artery systolic pressure is 21 mmHg.    IVC/SVC: Normal venous pressure at 3 mmHg.    Interventions: None    Complications: None    Disposition: Home or Self Care    Final Active Diagnoses:    Diagnosis Date Noted POA    PRINCIPAL PROBLEM:  Cerebrovascular accident (CVA) due to thrombosis of left middle cerebral artery [I63.312] 07/23/2024 Yes    Coronary artery disease due to calcified coronary lesion [I25.10, I25.84] 11/22/2013 Yes    Essential hypertension [I10]  Yes    Hypercholesteremia [E78.00]  Yes      Problems Resolved During this Admission:     No new Assessment & Plan notes have been filed under this hospital service since the last note was generated.  Service: Vascular Neurology      Recommendations:     Post-discharge complication risks: Falls    Stroke Education given to: patient    Follow-up in Stroke Clinic in 4-6 weeks.     Discharge Plan:  Antithrombotics: Aspirin 81mg, Clopidogrel 75mg  Statin: Rosuvastatin 40mg  Aggresive risk factor modification:  Hypertension  High  Cholesterol  Diet  Exercise  Carotid Artery disease    Follow Up:   Follow-up Information       Jasmina Rush MD Follow up in 1 week(s).    Specialty: Internal Medicine  Contact information:  [address] [facility]  [address]  [place] [address]  [phone]                             Patient Instructions:      Ambulatory referral/consult to Vascular Neurology   Standing Status: Future   Referral Priority: Routine Referral Type: Consultation   Referral Reason: Specialty Services Required   Requested Specialty: Vascular Neurology   Number of Visits Requested: 1     Diet Cardiac   Order Comments: See Stroke Patient Education Guide Booklet for details.     Call 911 for any of the following:   Order Comments: Call 911  right away if any of the following warning signs come on suddenly, even if the symptoms only last for a few minutes. With stroke, timing is very important.   - Warning Signs of Stroke:  - Weakness: You may feel a sudden weakness, tingling or loss of feeling on one side of your face or body.  - Vision Problems: You may have sudden double vision or trouble seeing in one or both eyes.  - Speech Problems: You may have sudden trouble talking, slured speech, or problems understanding others.  - Headache: You may have sudden, severe headache.  - Movement Problems: You may experience dizziness, a feeling of spinning, a loss of balance, a feeling of falling or blackouts.     Activity as tolerated       Medications:  Reconciled Home Medications:      Medication List        START taking these medications      clopidogreL 75 mg tablet  Commonly known as: PLAVIX  Take 1 tablet (75 mg total) by mouth once daily.  Start taking on: July 25, 2024            CONTINUE taking these medications      ACIDOPHILUS ORAL  Take 1 capsule by mouth once daily.     ALPRAZolam 0.25 MG tablet  Commonly known as: XANAX  Take 1 tablet (0.25 mg total) by mouth daily as needed for Anxiety.     amLODIPine 5 MG tablet  Commonly  known as: NORVASC  TAKE 1 TABLET EVERY DAY     aspirin 81 MG EC tablet  Commonly known as: ECOTRIN  Take 81 mg by mouth once daily.     BIOTIN ORAL  Take 1 tablet by mouth once daily.     cholecalciferol (vitamin D3) 25 mcg (1,000 unit) capsule  Commonly known as: VITAMIN D3  Take 1 capsule by mouth Daily.     coenzyme Q10 200 mg capsule  Take 200 mg by mouth once daily.     COLLAGEN PLUS VITAMIN C ORAL  Take 3 tablets by mouth once daily.     cyanocobalamin (vitamin B-12) 50 mcg tablet  Take 1 tablet by mouth Daily.     ELDERBERRY FRUIT ORAL  Take 5 mLs by mouth 2 (two) times daily.     fish oil-omega-3 fatty acids 300-1,000 mg capsule  Take 1 g by mouth once daily.     fluticasone propionate 50 mcg/actuation nasal spray  Commonly known as: FLONASE  1 spray by Each Nostril route as needed.     folic acid 400 MCG tablet  Commonly known as: FOLVITE  Take 400 mcg by mouth once daily.     GAS-X ORAL  Take 1 tablet by mouth as needed.     mirtazapine 7.5 MG Tab  Commonly known as: REMERON  Take 1 tablet (7.5 mg total) by mouth every evening.     MULTIVITAMIN 50 PLUS ORAL  Take 1 tablet by mouth once daily.     NEXIUM ORAL  Take 1 capsule by mouth once daily.     ondansetron 4 MG Tbdl  Commonly known as: ZOFRAN-ODT  Take 4 mg by mouth as needed.     polyethylene glycol 17 gram/dose powder  Commonly known as: GLYCOLAX  Take by mouth as needed. Pt taking 1 capful prn.     rosuvastatin 40 MG Tab  Commonly known as: CRESTOR  Take 1 tablet (40 mg total) by mouth once daily.     tretinoin 0.1 % cream  Commonly known as: RETIN-A  Apply topically nightly.     vitamin E 400 UNIT capsule  Take 400 mg by mouth once daily.            STOP taking these medications      butalbital-acetaminophen-caffeine -40 mg -40 mg per tablet  Commonly known as: FIORICET, ESGIC     calcium carbonate-vitamin D3 600-125 mg-unit Tab     cyclobenzaprine 10 MG tablet  Commonly known as: FLEXERIL     dexAMETHasone 4 MG Tab  Commonly known as:  DECADRON     lisinopriL 40 MG tablet  Commonly known as: PRINIVIL,ZESTRIL     nystatin ointment  Commonly known as: MYCOSTATIN     promethazine 25 MG tablet  Commonly known as: PHENERGAN            ASK your primary care doctor about these medications      carvediloL 25 MG tablet  Commonly known as: COREG  TAKE 1 TABLET TWICE DAILY     valsartan 320 MG tablet  Commonly known as: DIOVAN  TAKE 1 TABLET EVERY DAY              Billy Silva MD  [facility] Stroke [facility]  Department of Vascular Neurology   [facility] - Neurosurgery \[facility]\"")

## 2024-07-24 NOTE — ASSESSMENT & PLAN NOTE
- stroke risk factor  - home meds per cards note 5/2024: amlodipine 5, coreg 25 bid  - saw IM PCP 6/2024; no home med changes noted  - Resume amlodipine 5 for now. Follow up with PCP to resume home BP meds as needed.

## 2024-07-24 NOTE — PLAN OF CARE
Goals not set 2* no further OT needed.  Problem: Occupational Therapy  Goal: Occupational Therapy Goal  Outcome: Met

## 2024-07-24 NOTE — HOSPITAL COURSE
Hospital Course:  78F w/ PMHx HTN, HLD, CAD, and GERD who [place] [facility] [place] with right-sided weakness starting at 0300 7/23/24 upon waking. Initially not able to move right side at all, but gradually improved at 6AM but not back to baseline. NIHSS 1 for subtle RUE drift on initial stroke evaluation. CTA negative for LVO/high grade stenosis. Loaded with  and Plavix 300. MRI consistent with acute subcentimeter deep white matter infarct on left without hemorrhage/mass effect. Recent TTE 5/2024 EF 58% and +RWMA. PT/OT recommends no therapy. Pt with improving symptoms with minor weakness to right side. Will continue DAPT 21 days followed by ASA monotherapy. Plan for follow up in vascular neurology clinic. Pt to follow up with PCP for blood pressure management.

## 2024-07-24 NOTE — NURSING
Patient Transferred to NPU Room 940       Upon arrival to the floor, patient greeted and oriented to room. Complete head to toe assessment completed per protocol. VSS, see flowsheet for details. Neuro assessment completed. Primary team notified of patient's transfer to floor. All current and transfer orders reviewed/reconciled per protocol. All emergency equipment set up in patient's room. Fall/seizure precautions initiated per providers orders. 4 Eyes skin assessment performed, see below for details. Reviewed assessment and rounding frequency with patient and family. Questions were encouraged and addressed. Repositioned patient for comfort with bed locked in lowest position, side rails up x 3, bed alarm set, and call light within reach. Instructed patient to call staff for mobility/assistance, verbalized understanding. No acute signs of distress noted at this time.     Nurses Note -- 4 Eyes      7/23/2024   10:03 PM      Skin assessed during: Admit      [x] No Altered Skin Integrity Present    []Prevention Measures Documented      [] Yes- Altered Skin Integrity Present or Discovered   [] LDA Added if Not in Epic (Describe Wound)   [] New Altered Skin Integrity was Present on Admit and Documented in LDA   [] Wound Image Taken    Wound Care Consulted? No    Attending Nurse:  Mirlande Portillo RN/Staff Member:  LADY Stacy              +(If patient is with vascular neurology team, make sure to mention swallow assessment completed and, Please add to note that stroke booklet was transferred with patient)  NIHSS assessment completed on floor arrival. Patient's NIHSS score is 2 at this time. SCDs applied to patient per provider's orders. Warren bedside swallow screen completed per stroke protocol. Patient has pass warren bedside swallow screen, team notified of results. Stroke book individualized to patient and given to patient upon transfer. Reviewed stroke book with the patient at the bedside, see education flowsheets for  details.

## 2024-07-24 NOTE — PROGRESS NOTES
[facility] - Neurosurgery ([facility])  Vascular Neurology  Comprehensive Stroke Center  Progress Note    Assessment/Plan:     * Cerebrovascular accident (CVA) due to thrombosis of left middle cerebral artery  77 yo F with PMH of HTN, HLD, and CAD who presented to the ED with new right-sided weakness and numbness starting at 3:00AM (she woke up not being able to move her right side at all, gradually improved at 6:00AM but never returned to baseline.) NIHSS 1 on initial evaluation for subtle RUE drift. CTA MP negative for LVO or high-grade stenosis. MRI confirms acute left subcentimeter deep white matter infarct.    Recommendations:  - loaded with ASA and plavix. Continue DAPT 21 days followed by ASA monotherapy  - Continue statin  - CTH, CTA, MRI, and recent TTE reviewed  - seen by PT/OT/SLP who recommends no therapy  - dispo: home with outpt vascular neurology follow up    Coronary artery disease due to calcified coronary lesion  - stroke risk factor  - pt with history of CAD  - continue statin  - follows with outpt cards    Hypercholesteremia  - LDL 57.4. continue statin    Essential hypertension  - stroke risk factor  - home meds per cards note 5/2024: amlodipine 5, coreg 25 bid  - saw IM PCP 6/2024; no home med changes noted  - Resume amlodipine 5 for now. Follow up with PCP to resume home BP meds as needed.         Hospital Course:  78F w/ PMHx HTN, HLD, CAD, and GERD who presents to [facility] ED with right-sided weakness starting at 0300 7/23/24 upon waking. Initially not able to move right side at all, but gradually improved at 6AM but not back to baseline. NIHSS 1 for subtle RUE drift on initial stroke evaluation. CTA negative for LVO/high grade stenosis. Loaded with  and Plavix 300. MRI consistent with acute subcentimeter deep white matter infarct on left without hemorrhage/mass effect. Recent TTE 5/2024 EF 58% and +RWMA. PT/OT recommends no therapy. Pt with improving symptoms with minor weakness to right  side. Will continue DAPT 21 days followed by ASA monotherapy. Plan for follow up in vascular neurology clinic.    STROKE DOCUMENTATION   Acute Stroke Times   Last Known Normal Date: 07/22/24  Last Known Normal Time: 0300  Unknown Normal Time: Unknown Time  Symptom Onset Date: 07/23/24  Symptom Onset Time: 0300  Unknown Symptom Onset Time: Unknown Time  Stroke Team Called Date: 07/23/24  Stroke Team Called Time: 1007  Stroke Team Arrival Date: 07/23/24  Stroke Team Arrival Time: 1007  CT Interpretation Time: 1024  Thrombolytic Therapy Recommended: No  CTA Interpretation Time: 1030  Thrombectomy Recommended: No    NIH Scale:  1a. Level of Consciousness: 0-->Alert, keenly responsive  1b. LOC Questions: 0-->Answers both questions correctly  1c. LOC Commands: 0-->Performs both tasks correctly  2. Best Gaze: 0-->Normal  3. Visual: 0-->No visual loss  4. Facial Palsy: 0-->Normal symmetrical movements  5a. Motor Arm, Left: 0-->No drift, limb holds 90 (or 45) degrees for full 10 secs  5b. Motor Arm, Right: 0-->No drift, limb holds 90 (or 45) degrees for full 10 secs  6a. Motor Leg, Left: 0-->No drift, leg holds 30 degree position for full 5 secs  6b. Motor Leg, Right: 0-->No drift, leg holds 30 degree position for full 5 secs  7. Limb Ataxia: 0-->Absent  8. Sensory: 0-->Normal, no sensory loss  9. Best Language: 0-->No aphasia, normal  10. Dysarthria: 0-->Normal  11. Extinction and Inattention (formerly Neglect): 0-->No abnormality  Total (NIH Stroke Scale): 0       Modified [place] Score: 1  Eddie Coma Scale:    ABCD2 Score:    [id number] Score:   HAS -BLED Score:   ICH Score:   Hunt & Lopez Classification:      Hemorrhagic change of an Ischemic Stroke: Does this patient have an ischemic stroke with hemorrhagic changes? No     Neurologic Chief Complaint: left caudothalamic/periventricular infarct    Subjective:     Interval History: Patient is seen for follow-up neurological assessment and treatment recommendations: Seen  and examined at bedside. Pt reports improving symptoms. Feels near baseline but still with some residual weakness. PT/OT/SLP recs no therapy.    HPI, Past Medical, Family, and Social History remains the same as documented in the initial encounter.     Review of Systems   Constitutional:  Negative for chills and fever.   HENT:  Negative for trouble swallowing.    Eyes:  Negative for visual disturbance.   Respiratory:  Negative for shortness of breath.    Cardiovascular:  Negative for chest pain and leg swelling.   Gastrointestinal:  Negative for abdominal pain, blood in stool, constipation, diarrhea, nausea and vomiting.   Genitourinary:  Negative for dysuria.   Neurological:  Positive for weakness. Negative for dizziness, facial asymmetry, light-headedness, numbness and headaches.   Psychiatric/Behavioral:  Negative for agitation.      Scheduled Meds:   aspirin  81 mg Oral Daily    atorvastatin  80 mg Oral Daily    clopidogreL  75 mg Oral Daily    heparin (porcine)  5,000 Units Subcutaneous Q8H     Continuous Infusions:  PRN Meds:  Current Facility-Administered Medications:     acetaminophen, 1,000 mg, Oral, Q6H PRN    bisacodyL, 10 mg, Rectal, Daily PRN    labetalol, 10 mg, Intravenous, Q15 Min PRN    melatonin, 6 mg, Oral, Nightly PRN    sodium chloride 0.9%, 500 mL, Intravenous, PRN    sodium chloride 0.9%, 10 mL, Intravenous, PRN    Objective:     Vital Signs (Most Recent):  Temp: 98.6 °F (37 °C) (07/24/24 0827)  Pulse: 61 (07/24/24 0827)  Resp: 16 (07/24/24 0827)  BP: (!) 168/78 (07/24/24 0827)  SpO2: 95 % (07/24/24 0827)  BP Location: Right arm    Vital Signs Range (Last 24H):  Temp:  [97.9 °F (36.6 °C)-98.6 °F (37 °C)]   Pulse:  [61-76]   Resp:  [16-18]   BP: (158-178)/(75-86)   SpO2:  [93 %-97 %]   BP Location: Right arm       Physical Exam  Constitutional:       General: She is not in acute distress.  HENT:      Head: Normocephalic and atraumatic.      Mouth/Throat:      Mouth: Mucous membranes are moist.       Pharynx: Oropharynx is clear.   Eyes:      General: No scleral icterus.  Cardiovascular:      Rate and Rhythm: Normal rate and regular rhythm.      Pulses: Normal pulses.   Pulmonary:      Effort: Pulmonary effort is normal. No respiratory distress.   Abdominal:      General: Abdomen is flat. There is no distension.   Musculoskeletal:      Cervical back: Normal range of motion.      Right lower leg: No edema.      Left lower leg: No edema.   Skin:     General: Skin is warm and dry.   Neurological:      Mental Status: She is alert.      Comments: Please see neurological exam below   Psychiatric:         Behavior: Behavior normal.              Neurological Exam:   LOC: alert  Attention Span: Good   Language: No aphasia  Articulation: No dysarthria  Orientation: Person, Place, Time   Visual Fields: Full  EOM (CN III, IV, VI): Full/intact  Pupils (CN II, III): PERRL  Facial Sensation (CN V): Normal  Facial Movement (CN VII): Symmetric facial expression    Motor: Arm left  Normal 5/5  Leg left  Normal 5/5  Arm right  Paresis: 4/5  Leg right Paresis: 4/5  Cerebellum: No evidence of appendicular or axial ataxia  Sensation: Intact to light touch, temperature and vibration  Tone: Normal tone throughout    Laboratory:  CMP:   Recent Labs   Lab 24  0539   CALCIUM 9.1   ALBUMIN 3.2*   PROT 6.1      K 3.8   CO2 23      BUN 6*   CREATININE 0.8   ALKPHOS 56   ALT 13   AST 19   BILITOT 0.6     CBC:   Recent Labs   Lab 24  0539   WBC 6.39   RBC 4.27   HGB 13.2   HCT 41.3      MCV 97   MCH 30.9   MCHC 32.0     Lipid Panel:   Recent Labs   Lab 24  1055   CHOL 149   LDLCALC 57.4*   HDL 72   TRIG 98     TSH:   Recent Labs   Lab 24  1055   TSH 1.807       Diagnostic Results   Brain Imagin2024 MRI Brain   Impression:  Findings most consistent with an acute subcentimeter deep white matter infarct on the left without intracranial hemorrhage or mass effect.     Vessel  Imagin2024 CTA Stroke MP  - No acute intracranial process.  - No significant stenosis at the carotid bifurcations by NASCET criteria.  Mild areas of narrowing of the vertebral arteries bilaterally.  - No major branch advanced stenosis/occlusion at the [id number]-Ttfwgn.     Cardiac Evaluation:   2024 EKG  Normal sinus rhythm   Left bundle branch block   Abnormal ECG      2024 TTE    Left Ventricle: The left ventricle is normal in size. Mild septal thickening. There is concentric remodeling. Regional wall motion abnormalities present. Septal motion is abnormal. There is normal systolic function with a visually estimated ejection fraction of 55 - 60%. Ejection fraction by visual approximation is 58%. There is indeterminate diastolic function.    Right Ventricle: Normal right ventricular cavity size. Wall thickness is normal. Systolic function is normal.    Aortic Valve: The aortic valve is a trileaflet valve. There is mild aortic valve sclerosis. There is mild annular calcification present. There is mild aortic regurgitation.    Tricuspid Valve: There is mild regurgitation.    Pulmonary Artery: The estimated pulmonary artery systolic pressure is 21 mmHg.    IVC/SVC: Normal venous pressure at 3 mmHg.    Billy Silva MD  Comprehensive Stroke Center  Department of Vascular Neurology   [facility] Neurosurgery [facility])

## 2024-07-24 NOTE — PLAN OF CARE
[facility] - Neurosurgery (Hospital)  Initial Discharge Assessment       Primary Care Provider: Jasmina Rush MD    Admission Diagnosis: Acute focal neurological deficit [R29.818]    Admission Date: 7/23/2024  Expected Discharge Date: 7/24/2024    Transition of Care Barriers: (P) None    Payor: HU[employer] MANAGED MEDICARE / Plan: HU[employer] MEDICARE HMO / Product Type: Capitation /     Extended Emergency Contact Information  Primary Emergency Contact: Damian Chavez  Mobile Phone: [phone]  Relation: Son  Preferred language: English   needed? No    Discharge Plan A: (P) Home         [facility] [place] - [address]  [address]  [place] [address]  Phone: [phone] Fax: [phone]    [facility] - [facility] [address]  [address] [facility] [address]  Phone: [phone] Fax: [phone]    [id number] - [place] - [address] AT [facility] [address]  [address] [place] [address]  Phone: [phone] Fax: [phone]      Initial Assessment (most recent)       Adult Discharge Assessment - 07/24/24 1212          Discharge Assessment    Assessment Type Discharge Planning Assessment (P)      Confirmed/corrected address, phone number and insurance Yes (P)      Confirmed Demographics Correct on Facesheet (P)      Source of Information patient (P)      When was your last doctors appointment? 06/18/24 (P)      Does patient/caregiver understand observation status Yes (P)      Communicated SAMANTHA with patient/caregiver Yes (P)      Reason For Admission CVA (P)      People in Home alone (P)      Do you expect to return to your current living situation? Yes (P)      Do you have help at home or someone to help you manage your care at home? Yes (P)      Who are your caregiver(s) and their phone number(s)? Boyd Salgado [phone] (P)      Prior to hospitilization cognitive status: Unable to  Assess (P)      Current cognitive status: Alert/Oriented (P)      Walking or Climbing Stairs Difficulty no (P)      Dressing/Bathing Difficulty no (P)      Home Layout Bathroom on 2nd floor;Bedroom on 2nd floor (P)      Equipment Currently Used at Home none (P)      Readmission within 30 days? No (P)      Patient currently being followed by outpatient case management? No (P)      Do you currently have service(s) that help you manage your care at home? No (P)      Do you take prescription medications? Yes (P)      Do you have prescription coverage? Yes (P)      Coverage [employer] Managed Medicare (P)      Do you have any problems affording any of your prescribed medications? No (P)      Is the patient taking medications as prescribed? yes (P)      Who is going to help you get home at discharge? Son Damian (P)      How do you get to doctors appointments? family or friend will provide (P)      Are you on dialysis? No (P)      Do you take coumadin? No (P)      Discharge Plan A Home (P)      DME Needed Upon Discharge  none (P)      Discharge Plan discussed with: Patient (P)      Transition of Care Barriers None (P)         Physical Activity    On average, how many days per week do you engage in moderate to strenuous exercise (like a brisk walk)? 3 days (P)      On average, how many minutes do you engage in exercise at this level? 20 min (P)         Financial Resource Strain    How hard is it for you to pay for the very basics like food, housing, medical care, and heating? Not hard at all (P)         Housing Stability    In the last 12 months, was there a time when you were not able to pay the mortgage or rent on time? No (P)      At any time in the past 12 months, were you homeless or living in a shelter (including now)? No (P)         Transportation Needs    Has the lack of transportation kept you from medical appointments, meetings, work or from getting things needed for daily living? No (P)         Food Insecurity     Within the past 12 months, you worried that your food would run out before you got the money to buy more. Never true (P)      Within the past 12 months, the food you bought just didn't last and you didn't have money to get more. Never true (P)         Stress    Do you feel stress - tense, restless, nervous, or anxious, or unable to sleep at night because your mind is troubled all the time - these days? Only a little (P)         Social Isolation    How often do you feel lonely or isolated from those around you?  Rarely (P)         Alcohol Use    Q1: How often do you have a drink containing alcohol? Monthly or less (P)      Q2: How many drinks containing alcohol do you have on a typical day when you are drinking? 1 or 2 (P)      Q3: How often do you have six or more drinks on one occasion? Never (P)         Utilities    In the past 12 months has the electric, gas, oil, or water company threatened to shut off services in your home? No (P)         Health Literacy    How often do you need to have someone help you when you read instructions, pamphlets, or other written material from your doctor or pharmacy? Sometimes (P)                    Patient resides alone in a 2nd floor apartment with 8 steps up and bilateral hand rails.  Patient is independent with ambulation and ADL's and has no DME or HH needs.  Patient is not on Coumadin or HD.      Patient will discharge home when cleared.  Her son can provide transportation and support as needed.  No anticipated post acute needs.      Discharge Plan A and Plan B have been determined by review of patient's clinical status, future medical and therapeutic needs, and coverage/benefits for post-acute care in coordination with multidisciplinary team members.    Lesli Patino, TIMI  [facility]  [phone]

## 2024-07-24 NOTE — CONSULTS
Inpatient consult to Physical Medicine Rehab  Consult performed by: Consuelo Crain NP  Consult ordered by: Iram Calle MD  Reason for consult: Rehab      Consult received.     AFUA Lizama, FNP-C  Physical Medicine & Rehabilitation   07/24/2024

## 2024-07-25 ENCOUNTER — PATIENT OUTREACH (OUTPATIENT)
Dept: ADMINISTRATIVE | Facility: CLINIC | Age: 79
End: 2024-07-25
Payer: MEDICARE

## 2024-07-25 NOTE — PROGRESS NOTES
C3 nurse spoke with Nesha Landa  for a TCC post hospital discharge follow up call. The patient has a scheduled HOSFU appointment with Marco Doran Monday Aug 5, 2024 10:00 AM. Patient advised of recommended HOSFU within 5-7 days and offered to schedule or route message to PCP office. Patient declined assistance with scheduling at this time.

## 2024-07-29 ENCOUNTER — HOME CARE VISIT (OUTPATIENT)
Dept: NEUROLOGY | Facility: HOSPITAL | Age: 79
End: 2024-07-29
Payer: MEDICARE

## 2024-07-29 ENCOUNTER — TELEPHONE (OUTPATIENT)
Dept: PRIMARY CARE CLINIC | Facility: CLINIC | Age: 79
End: 2024-07-29
Payer: MEDICARE

## 2024-07-29 NOTE — TELEPHONE ENCOUNTER
----- Message from Rachelle Kimble sent at 7/29/2024  8:03 AM CDT -----  Regarding: pt advise  Contact: pt  Pt would like to speak w/ you regarding her Medications, States she's been off meds since discharge from Hospital     Please rahul    Phone  [phone]    Thank You

## 2024-07-30 NOTE — TELEPHONE ENCOUNTER
Left pt message about why she stopped taking medications and what potential step she wanted to pursue.

## 2024-08-05 ENCOUNTER — OFFICE VISIT (OUTPATIENT)
Dept: PRIMARY CARE CLINIC | Facility: CLINIC | Age: 79
End: 2024-08-05
Payer: MEDICARE

## 2024-08-05 VITALS
HEIGHT: 64 IN | WEIGHT: 129.63 LBS | DIASTOLIC BLOOD PRESSURE: 88 MMHG | OXYGEN SATURATION: 96 % | BODY MASS INDEX: 22.13 KG/M2 | SYSTOLIC BLOOD PRESSURE: 130 MMHG | HEART RATE: 86 BPM

## 2024-08-05 DIAGNOSIS — F41.9 ANXIETY: ICD-10-CM

## 2024-08-05 DIAGNOSIS — Z09 HOSPITAL DISCHARGE FOLLOW-UP: ICD-10-CM

## 2024-08-05 DIAGNOSIS — I10 ESSENTIAL HYPERTENSION: ICD-10-CM

## 2024-08-05 DIAGNOSIS — Z86.73 HISTORY OF CVA (CEREBROVASCULAR ACCIDENT) WITHOUT RESIDUAL DEFICITS: Primary | ICD-10-CM

## 2024-08-05 DIAGNOSIS — E78.5 DYSLIPIDEMIA: ICD-10-CM

## 2024-08-05 PROCEDURE — 3075F SYST BP GE 130 - 139MM HG: CPT | Mod: HCNC,CPTII,S$GLB, | Performed by: INTERNAL MEDICINE

## 2024-08-05 PROCEDURE — 99214 OFFICE O/P EST MOD 30 MIN: CPT | Mod: HCNC,S$GLB,, | Performed by: INTERNAL MEDICINE

## 2024-08-05 PROCEDURE — 99999 PR PBB SHADOW E&M-EST. PATIENT-LVL IV: CPT | Mod: PBBFAC,HCNC,, | Performed by: INTERNAL MEDICINE

## 2024-08-05 PROCEDURE — 1101F PT FALLS ASSESS-DOCD LE1/YR: CPT | Mod: HCNC,CPTII,S$GLB, | Performed by: INTERNAL MEDICINE

## 2024-08-05 PROCEDURE — 3079F DIAST BP 80-89 MM HG: CPT | Mod: HCNC,CPTII,S$GLB, | Performed by: INTERNAL MEDICINE

## 2024-08-05 PROCEDURE — 1126F AMNT PAIN NOTED NONE PRSNT: CPT | Mod: HCNC,CPTII,S$GLB, | Performed by: INTERNAL MEDICINE

## 2024-08-05 PROCEDURE — 1159F MED LIST DOCD IN RCRD: CPT | Mod: HCNC,CPTII,S$GLB, | Performed by: INTERNAL MEDICINE

## 2024-08-05 PROCEDURE — 3288F FALL RISK ASSESSMENT DOCD: CPT | Mod: HCNC,CPTII,S$GLB, | Performed by: INTERNAL MEDICINE

## 2024-08-05 RX ORDER — ROSUVASTATIN CALCIUM 40 MG/1
40 TABLET, COATED ORAL DAILY
Qty: 90 TABLET | Refills: 3 | Status: SHIPPED | OUTPATIENT
Start: 2024-08-05

## 2024-08-05 RX ORDER — BUTALBITAL, ACETAMINOPHEN AND CAFFEINE 50; 325; 40 MG/1; MG/1; MG/1
1 TABLET ORAL DAILY PRN
Qty: 30 TABLET | Refills: 0 | Status: SHIPPED | OUTPATIENT
Start: 2024-08-05 | End: 2024-09-04

## 2024-08-05 RX ORDER — PROMETHAZINE HYDROCHLORIDE 25 MG/1
25 TABLET ORAL EVERY 6 HOURS PRN
Qty: 30 TABLET | Refills: 1 | Status: SHIPPED | OUTPATIENT
Start: 2024-08-05

## 2024-08-05 RX ORDER — ALPRAZOLAM 0.25 MG/1
0.25 TABLET ORAL DAILY PRN
Qty: 20 TABLET | Refills: 1 | Status: SHIPPED | OUTPATIENT
Start: 2024-08-05

## 2024-08-05 RX ORDER — ALPRAZOLAM 0.25 MG/1
0.25 TABLET ORAL DAILY PRN
Qty: 20 TABLET | Refills: 1 | OUTPATIENT
Start: 2024-08-05

## 2024-08-05 RX ORDER — VALSARTAN 320 MG/1
320 TABLET ORAL DAILY
Qty: 90 TABLET | Refills: 3 | Status: SHIPPED | OUTPATIENT
Start: 2024-08-05

## 2024-08-06 ENCOUNTER — TELEPHONE (OUTPATIENT)
Dept: NEUROLOGY | Facility: HOSPITAL | Age: 79
End: 2024-08-06
Payer: MEDICARE

## 2024-08-08 ENCOUNTER — TELEPHONE (OUTPATIENT)
Dept: PRIMARY CARE CLINIC | Facility: CLINIC | Age: 79
End: 2024-08-08
Payer: MEDICARE

## 2024-08-12 ENCOUNTER — TELEPHONE (OUTPATIENT)
Dept: PRIMARY CARE CLINIC | Facility: CLINIC | Age: 79
End: 2024-08-12
Payer: MEDICARE

## 2024-08-12 NOTE — TELEPHONE ENCOUNTER
----- Message from Marco Doran MD sent at 8/5/2024 10:06 AM CDT -----  Regarding: BP readings after stroke  Can you reach out to get blood pressure readings

## 2024-08-12 NOTE — TELEPHONE ENCOUNTER
Spoke with patient re blood pressure readings;   08/12 11:00 am- 93/61 pulse 66  2:15 pm- 110/78 pulse 65     Patient states she will continue to take readings and send an updated log later this week.

## 2024-08-16 ENCOUNTER — TELEPHONE (OUTPATIENT)
Dept: PRIMARY CARE CLINIC | Facility: CLINIC | Age: 79
End: 2024-08-16
Payer: MEDICARE

## 2024-08-16 NOTE — TELEPHONE ENCOUNTER
Spoke with pt re; blood pressure readings looking acceptable and it is ok to continue current medications.     Pt expressed understanding and all questions were answered.

## 2024-08-16 NOTE — TELEPHONE ENCOUNTER
----- Message from Kaylee Bower sent at 8/15/2024  3:48 PM CDT -----  Contact: Mobile  [phone]  Patient said that you told her to monitor her Blood pressure and she said that her pressure today at three twenty eight is 112/73..      Nine this morning 140/80 pulse 61..    Patient said that she have three others readings and she would like for you to give her a call.

## 2024-08-16 NOTE — TELEPHONE ENCOUNTER
Spoke with pt regarding blood pressure readings;     08/13 1:25 pm - 133/77 pulse 64  5:45pm - 104/71, pulse 68    8/14 5:43pm -118/68, pulse 67     08/15 9:00 am- 140/80 pulse 61  3:28 pm -112/73 pulse 62

## 2024-08-20 ENCOUNTER — OFFICE VISIT (OUTPATIENT)
Dept: NEUROLOGY | Facility: CLINIC | Age: 79
End: 2024-08-20
Payer: MEDICARE

## 2024-08-20 VITALS
BODY MASS INDEX: 22.13 KG/M2 | WEIGHT: 129.63 LBS | HEART RATE: 60 BPM | SYSTOLIC BLOOD PRESSURE: 107 MMHG | DIASTOLIC BLOOD PRESSURE: 67 MMHG | HEIGHT: 64 IN

## 2024-08-20 DIAGNOSIS — Z86.73 H/O ISCHEMIC LEFT MCA STROKE: Primary | ICD-10-CM

## 2024-08-20 DIAGNOSIS — I10 ESSENTIAL HYPERTENSION: ICD-10-CM

## 2024-08-20 DIAGNOSIS — E78.5 HYPERLIPIDEMIA WITH TARGET LDL LESS THAN 70: ICD-10-CM

## 2024-08-20 PROBLEM — G81.90 HEMIPARESIS: Status: RESOLVED | Noted: 2024-07-24 | Resolved: 2024-08-20

## 2024-08-20 PROCEDURE — 99213 OFFICE O/P EST LOW 20 MIN: CPT | Mod: HCNC,S$GLB,, | Performed by: NURSE PRACTITIONER

## 2024-08-20 PROCEDURE — 3078F DIAST BP <80 MM HG: CPT | Mod: HCNC,CPTII,S$GLB, | Performed by: NURSE PRACTITIONER

## 2024-08-20 PROCEDURE — 1101F PT FALLS ASSESS-DOCD LE1/YR: CPT | Mod: HCNC,CPTII,S$GLB, | Performed by: NURSE PRACTITIONER

## 2024-08-20 PROCEDURE — 99999 PR PBB SHADOW E&M-EST. PATIENT-LVL IV: CPT | Mod: PBBFAC,HCNC,, | Performed by: NURSE PRACTITIONER

## 2024-08-20 PROCEDURE — 1126F AMNT PAIN NOTED NONE PRSNT: CPT | Mod: HCNC,CPTII,S$GLB, | Performed by: NURSE PRACTITIONER

## 2024-08-20 PROCEDURE — 1159F MED LIST DOCD IN RCRD: CPT | Mod: HCNC,CPTII,S$GLB, | Performed by: NURSE PRACTITIONER

## 2024-08-20 PROCEDURE — 1160F RVW MEDS BY RX/DR IN RCRD: CPT | Mod: HCNC,CPTII,S$GLB, | Performed by: NURSE PRACTITIONER

## 2024-08-20 PROCEDURE — 3074F SYST BP LT 130 MM HG: CPT | Mod: HCNC,CPTII,S$GLB, | Performed by: NURSE PRACTITIONER

## 2024-08-20 PROCEDURE — 3288F FALL RISK ASSESSMENT DOCD: CPT | Mod: HCNC,CPTII,S$GLB, | Performed by: NURSE PRACTITIONER

## 2024-08-20 RX ORDER — CEPHALEXIN 500 MG/1
500 CAPSULE ORAL 2 TIMES DAILY
COMMUNITY
Start: 2024-08-13

## 2024-08-20 NOTE — PROGRESS NOTES
[facility] VISIT      SUBJECTIVE:    History for Present Illness: Nesha Landa is a 78 y.o.  female  with PMHx of HTN, HLD and CAD who presents to me today for hospital follow-up.    Relevant HPI:  Patient presented to [facility] [place] ED on 2024 with new onset right-sided weakness and numbness.  NIHSS of 1.  CTA negative for large vessel occlusion or high-grade stenosis.  MRI consistent with acute subcentimeter deep white matter infarct on the left      Interval history:    At the time of today's visit, the patient denies new or worsening focal neurologic symptoms concerning for new stroke or TIA. She is doing well overall  .  She denies associated neurologic symptoms specifically; vertigo, double vision, focal weakness or numbness, gait imbalance,  language or visual disturbances.  She is independent with ADL's.  She was not participating in outpatient therapy as she was at her functional baseline.  She denies alcohol/tobacco/recreational drug use.  Patient is adherent with home medications including aspirin and statin.        Past Medical History:   Diagnosis Date    Arthritis     Coronary arteriosclerosis     HTN (hypertension)     Hypercholesteremia     LBBB (left bundle branch block)     Osteopenia      Past Surgical History:   Procedure Laterality Date    CATARACT EXTRACTION      10 years+ OU    HYSTERECTOMY      partial colectomy       Family History   Problem Relation Name Age of Onset    Gallbladder disease Mother      Heart disease Mother      Coronary artery disease Father          MI -     Heart disease Father      Heart disease Sister tung     Hyperlipidemia Sister tung     No Known Problems Sister      Colon cancer Maternal Grandmother      Diabetes Maternal Grandmother      Diabetes Daughter      COPD Daughter      Diabetes Daughter      Drug abuse Daughter      No Known Problems Son      No Known Problems Son      No Known Problems Son      Amblyopia Neg Hx       Blindness Neg Hx      Cancer Neg Hx      Cataracts Neg Hx      Glaucoma Neg Hx      Hypertension Neg Hx      Macular degeneration Neg Hx      Retinal detachment Neg Hx      Strabismus Neg Hx      Stroke Neg Hx      Thyroid disease Neg Hx          Current Outpatient Medications:     ALPRAZolam (XANAX) 0.25 MG tablet, Take 1 tablet (0.25 mg total) by mouth daily as needed for Anxiety., Disp: 20 tablet, Rfl: 1    amLODIPine (NORVASC) 5 MG tablet, TAKE 1 TABLET EVERY DAY, Disp: 90 tablet, Rfl: 3    ascorbic acid/collagen hydr (COLLAGEN PLUS VITAMIN C ORAL), Take 3 tablets by mouth once daily., Disp: , Rfl:     aspirin (ECOTRIN) 81 MG EC tablet, Take 81 mg by mouth once daily., Disp: , Rfl:     butalbital-acetaminophen-caffeine -40 mg (FIORICET, ESGIC) -40 mg per tablet, Take 1 tablet by mouth daily as needed for Headaches., Disp: 30 tablet, Rfl: 0    carvediloL (COREG) 25 MG tablet, TAKE 1 TABLET TWICE DAILY, Disp: 180 tablet, Rfl: 3    cephALEXin (KEFLEX) 500 MG capsule, Take 500 mg by mouth 2 (two) times daily., Disp: , Rfl:     cholecalciferol, vitamin D3, 1,000 unit capsule, Take 1 capsule by mouth Daily.  , Disp: , Rfl:     coenzyme Q10 200 mg capsule, Take 200 mg by mouth once daily., Disp: , Rfl:     cyanocobalamin, vitamin B-12, 50 mcg tablet, Take 1 tablet by mouth Daily.  , Disp: , Rfl:     ELDERBERRY FRUIT ORAL, Take 5 mLs by mouth 2 (two) times daily., Disp: , Rfl:     esomeprazole magnesium (NEXIUM ORAL), Take 1 capsule by mouth once daily., Disp: , Rfl:     fish oil-omega-3 fatty acids 300-1,000 mg capsule, Take 1 g by mouth once daily., Disp: , Rfl:     fluticasone (FLONASE) 50 mcg/actuation nasal spray, 1 spray by Each Nostril route as needed., Disp: , Rfl: 6    LACTOBACILLUS ACIDOPHILUS (ACIDOPHILUS ORAL), Take 1 capsule by mouth once daily., Disp: , Rfl:     multivit with minerals/lutein (MULTIVITAMIN 50 PLUS ORAL), Take 1 tablet by mouth once daily., Disp: , Rfl:     ondansetron  "(ZOFRAN-ODT) 4 MG TbDL, Take 4 mg by mouth as needed. , Disp: , Rfl:     polyethylene glycol (GLYCOLAX) 17 gram/dose powder, Take by mouth as needed. Pt taking 1 capful prn., Disp: , Rfl:     promethazine (PHENERGAN) 25 MG tablet, Take 1 tablet (25 mg total) by mouth every 6 (six) hours as needed for Nausea., Disp: 30 tablet, Rfl: 1    rosuvastatin (CRESTOR) 40 MG Tab, Take 1 tablet (40 mg total) by mouth once daily., Disp: 90 tablet, Rfl: 3    SIMETHICONE (GAS-X ORAL), Take 1 tablet by mouth as needed., Disp: , Rfl:     tretinoin (RETIN-A) 0.1 % cream, Apply topically nightly., Disp: , Rfl:     valsartan (DIOVAN) 320 MG tablet, Take 1 tablet (320 mg total) by mouth once daily., Disp: 90 tablet, Rfl: 3    vitamin E 400 UNIT capsule, Take 400 mg by mouth once daily., Disp: , Rfl:      Review of Systems:  Review of systems is negative except for the symptoms mentioned in HPI    Physical exam:    /67 (BP Location: Left arm, Patient Position: Sitting, BP Method: Medium (Automatic))   Pulse 60   Ht 5' 4" (1.626 m)   Wt 58.8 kg (129 lb 10.1 oz)   BMI 22.25 kg/m²     General: Well-developed, well-groomed. No apparent distress  HENT: Normocephalic, atraumatic.    Cardiovascular: Regular rate and rhythm  Chest: No audible wheezes, stridor, ronchi appreciated.  Musculoskeletal: No peripheral edema     Neurologic Exam: The patient is awake, alert and oriented to person, place, time and situation. Attentive, vigilant during exam. Language is fluent. Naming & repetition intact, +2-step commands.  Fund of knowledge is appropriate. Well organized thoughts.     Cranial nerves:   CN II: Visual fields are full to confrontation. Pupils are 4 mm and briskly reactive to light.  CN III, IV, VI: EOMI, no nystagmus, no ptosis  CN V: Facial sensation is intact in all 3 divisions bilaterally.  CN VII: Face is symmetric with normal eye closure and smile.  CN VIII: Hearing is normal bilaterally  CN IX, X: Palate elevates " symmetrically. Phonation is normal.  CN XI: Head turning and shoulder shrug are intact  CN XII: Tongue is midline with normal movements and no atrophy.     Motor examination of all extremities demonstrates normal bulk and tone in all four limbs. There are no atrophy or fasciculations.        Left Right     Left Right   Deltoid 5/5 5/5   Hip Flexion 5/5 5/5   Biceps 5/5 5/5   Hip Extension 5/5 5/5   Triceps 5/5 5/5   Knee Flexion 5/5 5/5   Wrist Ext 5/5 5/5   Knee Extension 5/5 5/5   Finger Abd 5/5 5/5   Ankle dorsiflex 5/5 5/5           Ankle plantar flex 5/5 5/5     Sensory examination is normal light touch in BUE and BLE.  Romberg is negative.  Deep tendon reflexes No clonus. Negative Brooks's     Gait: Normal tandem, and casual gait.     Coordination: No dysmetria with finger-to-nose. Rapid alternating movements and fine finger movements are intact.         Relevant Labwork:  Recent Labs   Lab 06/18/24  1342 07/23/24  1055   Hemoglobin A1C 5.6  --    LDL Cholesterol  --  57.4 L   HDL  --  72   Triglycerides  --  98   Cholesterol  --  149       Diagnostic Results:  Imaging was independently reviewed by myself, along with the associated radiology report    Brain Imaging   MRI brain 07/23/24:  Findings most consistent with an acute subcentimeter deep white matter infarct on the left without intracranial hemorrhage or mass effect  Vessel Imaging   CTA stroke MP 07/23/2024:  No acute intracranial abnormalities  No significant stenosis of the carotid bifurcation.    Mild areas of narrowing of the vertebral arteries bilaterally without flow-limiting stenosis  No flow-limiting stenosis/occlusion of the [place]  Cardiac Imaging   TTE 05/31/2024:  EF of 50%  Intermediate diastolic function  No left atrial enlargement    Assessment:  1. H/O ischemic left MCA stroke        2. Essential hypertension        3. Hyperlipidemia with target LDL less than 70          Nesha Landa  is a 78 y.o.  female  seen today in  clinic for follow-up assessment and recommendations. The following recommendations and plan were discussed in depth with the patient who voiced understanding and was in agreement.  Plan:  History of left MCA stroke  --In-depth discussion with patient regarding diagnosis ,imaging findings  & stroke risk factors  -No current clinical indication for anticoagulation at this time.   -Plan for aggressive risk factor modification and maximum medical management  -- Antithrombotics/ Anticoagulation: Aspirin 81 mg daily,  indefinitely   - Stroke Risk Factors:  HTN, diet, exercise  - Lipid Management: Target is LDL < 70mg/dL. Continue Crestor 40 mg daily. Monitoring for liver dysfunction and myopathy is suggested for statins. To be addressed by PCP  -Hypertension: Long term goal is normotension w/ target BP of less than 130/80 mmHg.  Continue home medications and follow up with PCP for surveillance and chronic management  - Rehab efforts: None  - Diagnostics ordered/pending: none    Patient/Family teaching provided during this visit  -Identifying the signs and symptoms of stroke; emergency action and ER attention  -Risk factor co/ntrol  -Optimization for secondary stroke prevention including compliance with current medications   -We discussed the need to optimize lifestyle choices   -heart healthy diet (Mediterranean,MIND  or dash) to include a variety of brain friendly foods to help optimize cognitive health and longevity  -increased cardiovascular exercise; goal of 150 minutes of moderate-intense per week  -Discussed that medication/lifestyle modifications are preventative,and nothing is absolute.     Questions and concerns were answered to the patient's stated verbal satisfaction. The patient verbalizes understanding and agreement with the above stated treatment plan.      I will plan on having Nesha Landa return to clinic as needed. The patient can contact my office with any questions or concerns they may have as they  arise in the interim.       Collaborating Physician, Dr Hernandez, was available during today's encounter. Any change to plan along with cosign to appear in the EMR    JASMINA Lewis  Department of Vascular Neurology  [facility]  [phone]    This note is dictated on M*Modal Fluency Direct word recognition program. There are word recognition mistakes that are occasionally missed on review

## 2024-09-05 ENCOUNTER — HOME CARE VISIT (OUTPATIENT)
Dept: NEUROLOGY | Facility: HOSPITAL | Age: 79
End: 2024-09-05
Payer: MEDICARE

## 2024-09-05 NOTE — PROGRESS NOTES
"Very Pleasant, pt remembered our last conversation from July 2024. She states she is progressing well, " Like nothing ever happened" . She declines the need for home visit. She takes her own BP, last was 140/80. No s/s, no complaints. Educated on fall prevention and safety precautions. She has attended FU appts.     "

## 2024-09-25 DIAGNOSIS — F41.9 ANXIETY: Primary | ICD-10-CM

## 2024-09-25 NOTE — TELEPHONE ENCOUNTER
No care due was identified.  [facility] Embedded Care Due Messages. Reference number: [id number].   9/25/2024 4:26:18 PM CDT

## 2024-09-25 NOTE — TELEPHONE ENCOUNTER
----- Message from Blaire Mcgregor sent at 9/25/2024  4:15 PM CDT -----  Contact: [phone]  Requesting an RX refill or new RX.    Is this a refill or new RX:     RX name and strength (copy/paste from chart):  ALPRAZolam (XANAX) 0.25 MG tablet 20 tablet    Is this a 30 day or 90 day RX:     Pharmacy name and phone # (copy/paste from chart):    [facility] - [place]  [address]  [place] [address]  Phone: [phone] Fax: [phone]

## 2024-09-26 RX ORDER — ALPRAZOLAM 0.25 MG/1
0.25 TABLET ORAL DAILY PRN
Qty: 20 TABLET | Refills: 1 | Status: SHIPPED | OUTPATIENT
Start: 2024-09-26

## 2024-10-11 ENCOUNTER — LAB VISIT (OUTPATIENT)
Dept: LAB | Facility: HOSPITAL | Age: 79
End: 2024-10-11
Attending: INTERNAL MEDICINE
Payer: MEDICARE

## 2024-10-11 ENCOUNTER — TELEPHONE (OUTPATIENT)
Dept: PRIMARY CARE CLINIC | Facility: CLINIC | Age: 79
End: 2024-10-11
Payer: MEDICARE

## 2024-10-11 ENCOUNTER — OFFICE VISIT (OUTPATIENT)
Dept: PRIMARY CARE CLINIC | Facility: CLINIC | Age: 79
End: 2024-10-11
Payer: MEDICARE

## 2024-10-11 VITALS
HEART RATE: 63 BPM | HEIGHT: 64 IN | BODY MASS INDEX: 22.13 KG/M2 | SYSTOLIC BLOOD PRESSURE: 106 MMHG | WEIGHT: 129.63 LBS | OXYGEN SATURATION: 95 % | DIASTOLIC BLOOD PRESSURE: 66 MMHG

## 2024-10-11 DIAGNOSIS — D69.2 SENILE PURPURA: ICD-10-CM

## 2024-10-11 DIAGNOSIS — D69.2 SENILE PURPURA: Primary | ICD-10-CM

## 2024-10-11 DIAGNOSIS — I10 ESSENTIAL HYPERTENSION: ICD-10-CM

## 2024-10-11 DIAGNOSIS — Z86.73 HISTORY OF CVA (CEREBROVASCULAR ACCIDENT): ICD-10-CM

## 2024-10-11 DIAGNOSIS — R23.3 EASY BRUISING: ICD-10-CM

## 2024-10-11 LAB
ALBUMIN SERPL BCP-MCNC: 3.5 G/DL (ref 3.5–5.2)
ALP SERPL-CCNC: 53 U/L (ref 55–135)
ALT SERPL W/O P-5'-P-CCNC: 18 U/L (ref 10–44)
ANION GAP SERPL CALC-SCNC: 8 MMOL/L (ref 8–16)
APTT PPP: 26.2 SEC (ref 21–32)
AST SERPL-CCNC: 23 U/L (ref 10–40)
BASOPHILS # BLD AUTO: 0.02 K/UL (ref 0–0.2)
BASOPHILS NFR BLD: 0.3 % (ref 0–1.9)
BILIRUB SERPL-MCNC: 0.4 MG/DL (ref 0.1–1)
BUN SERPL-MCNC: 13 MG/DL (ref 8–23)
CALCIUM SERPL-MCNC: 9.9 MG/DL (ref 8.7–10.5)
CHLORIDE SERPL-SCNC: 100 MMOL/L (ref 95–110)
CO2 SERPL-SCNC: 27 MMOL/L (ref 23–29)
CREAT SERPL-MCNC: 1 MG/DL (ref 0.5–1.4)
DIFFERENTIAL METHOD BLD: ABNORMAL
EOSINOPHIL # BLD AUTO: 0.1 K/UL (ref 0–0.5)
EOSINOPHIL NFR BLD: 1.7 % (ref 0–8)
ERYTHROCYTE [DISTWIDTH] IN BLOOD BY AUTOMATED COUNT: 13.9 % (ref 11.5–14.5)
EST. GFR  (NO RACE VARIABLE): 56.6 ML/MIN/1.73 M^2
GLUCOSE SERPL-MCNC: 102 MG/DL (ref 70–110)
HCT VFR BLD AUTO: 38.6 % (ref 37–48.5)
HGB BLD-MCNC: 12.9 G/DL (ref 12–16)
IMM GRANULOCYTES # BLD AUTO: 0.02 K/UL (ref 0–0.04)
IMM GRANULOCYTES NFR BLD AUTO: 0.3 % (ref 0–0.5)
INR PPP: 1 (ref 0.8–1.2)
LYMPHOCYTES # BLD AUTO: 1.6 K/UL (ref 1–4.8)
LYMPHOCYTES NFR BLD: 24.8 % (ref 18–48)
MCH RBC QN AUTO: 32 PG (ref 27–31)
MCHC RBC AUTO-ENTMCNC: 33.4 G/DL (ref 32–36)
MCV RBC AUTO: 96 FL (ref 82–98)
MONOCYTES # BLD AUTO: 0.6 K/UL (ref 0.3–1)
MONOCYTES NFR BLD: 8.6 % (ref 4–15)
NEUTROPHILS # BLD AUTO: 4.1 K/UL (ref 1.8–7.7)
NEUTROPHILS NFR BLD: 64.3 % (ref 38–73)
NRBC BLD-RTO: 0 /100 WBC
PLATELET # BLD AUTO: 208 K/UL (ref 150–450)
PMV BLD AUTO: 11.5 FL (ref 9.2–12.9)
POTASSIUM SERPL-SCNC: 4.1 MMOL/L (ref 3.5–5.1)
PROT SERPL-MCNC: 6.3 G/DL (ref 6–8.4)
PROTHROMBIN TIME: 11.2 SEC (ref 9–12.5)
RBC # BLD AUTO: 4.03 M/UL (ref 4–5.4)
SODIUM SERPL-SCNC: 135 MMOL/L (ref 136–145)
WBC # BLD AUTO: 6.38 K/UL (ref 3.9–12.7)

## 2024-10-11 PROCEDURE — 85610 PROTHROMBIN TIME: CPT | Mod: HCNC | Performed by: INTERNAL MEDICINE

## 2024-10-11 PROCEDURE — 36415 COLL VENOUS BLD VENIPUNCTURE: CPT | Mod: HCNC,PN | Performed by: INTERNAL MEDICINE

## 2024-10-11 PROCEDURE — 80053 COMPREHEN METABOLIC PANEL: CPT | Mod: HCNC | Performed by: INTERNAL MEDICINE

## 2024-10-11 PROCEDURE — 99999 PR PBB SHADOW E&M-EST. PATIENT-LVL III: CPT | Mod: PBBFAC,HCNC,, | Performed by: INTERNAL MEDICINE

## 2024-10-11 PROCEDURE — 85025 COMPLETE CBC W/AUTO DIFF WBC: CPT | Mod: HCNC | Performed by: INTERNAL MEDICINE

## 2024-10-11 PROCEDURE — 85730 THROMBOPLASTIN TIME PARTIAL: CPT | Mod: HCNC | Performed by: INTERNAL MEDICINE

## 2024-10-11 NOTE — TELEPHONE ENCOUNTER
----- Message from Cari sent at 10/11/2024  7:57 AM CDT -----  Contact: [phone] Patient  Patient would like to get medical advice.  Symptoms (please be specific): red marks on legs   Symptom: Rash or Redness on One Body Area Only  Outcome: Transfer to a nurse or provider NOW!  Reason: Dark red or purple spots    The caller accepted this outcome.    How long have you had these symptoms: last week   Would you like a call back, or a response through your [facility] portal?:   call back   Pharmacy name and phone # (copy from chart):     Comments:

## 2024-10-11 NOTE — TELEPHONE ENCOUNTER
Spoke with pt. Pt stated that she has red/purple bumps on her legs. Pt stated that she noticed the bumps a few weeks ago. Pt scheduled for an in person visit withDr. Doran on 10/11/24 at 1:20 pm. Pt verbalized understanding.

## 2024-10-11 NOTE — PROGRESS NOTES
[facility] Progress Note  10/11/2024          Reason for Visit:      wants to discuss purple bruises on arms/legs      History of Present Illness:     Patient presents today for a checkup and to discuss skin lesions on legs and arms.    SKIN CONCERNS:  She reports developing bruise-like spots on her legs and arms without associated pain or recalled trauma. The lesions are more extensive than previous skin changes. One spot had broken skin, cause unknown.    CARDIOVASCULAR HISTORY:  She was hospitalized approximately two months ago for a mild stroke. Blood thinners (plavix) were prescribed for three weeks post-event, then discontinued. Her blood pressure was recently 106/66, which she feels is good but questions if it's too low. She denies symptoms of severe low blood pressure but reports mild lightheadedness when standing up suddenly.  Current regimen includes carvedilol twice daily (morning and evening), baby aspirin in the morning, and valsartan and amlodipine in the evening. She has been on long-term aspirin therapy. HTN Medications were temporarily discontinued during recent hospitalization but gradually restarted upon discharge.    VACCINATIONS:  She declined the flu vaccine, citing concerns about existing health problems. She is up-to-date on pneumonia vaccine. RSV vaccine was discussed; she was previously unaware of this option.    SOCIAL HISTORY:  She is 79 years old, drives independently, and attends [employer] regularly. She denies current alcohol consumption or smoking, though reports a history of both when younger.    FAMILY HISTORY:  Father  at 62 years old and mother at 72 years old.           Problem List:     Patient Active Problem List   Diagnosis    Essential hypertension    Hyperlipidemia with target LDL less than 70    LBBB (left bundle branch block)    Coronary artery disease due to calcified coronary lesion    Vitamin D deficiency    Bradycardia - asymptomatic     Postmenopausal osteoporosis    Dyslipidemia    GERD (gastroesophageal reflux disease)    Insomnia    Enlarged thoracic aorta    Ascending aorta dilation    Goals of care, counseling/discussion    Cerebrovascular accident (CVA) due to thrombosis of left middle cerebral artery    Reduced mobility    DNR (do not resuscitate)    Sensory loss    H/O ischemic left MCA stroke         Medications:       Current Outpatient Medications:     ALPRAZolam (XANAX) 0.25 MG tablet, Take 1 tablet (0.25 mg total) by mouth daily as needed for Anxiety., Disp: 20 tablet, Rfl: 1    amLODIPine (NORVASC) 5 MG tablet, TAKE 1 TABLET EVERY DAY, Disp: 90 tablet, Rfl: 3    ascorbic acid/collagen hydr (COLLAGEN PLUS VITAMIN C ORAL), Take 3 tablets by mouth once daily., Disp: , Rfl:     aspirin (ECOTRIN) 81 MG EC tablet, Take 81 mg by mouth once daily., Disp: , Rfl:     carvediloL (COREG) 25 MG tablet, TAKE 1 TABLET TWICE DAILY, Disp: 180 tablet, Rfl: 3    cephALEXin (KEFLEX) 500 MG capsule, Take 500 mg by mouth 2 (two) times daily., Disp: , Rfl:     cholecalciferol, vitamin D3, 1,000 unit capsule, Take 1 capsule by mouth Daily.  , Disp: , Rfl:     coenzyme Q10 200 mg capsule, Take 200 mg by mouth once daily., Disp: , Rfl:     cyanocobalamin, vitamin B-12, 50 mcg tablet, Take 1 tablet by mouth Daily.  , Disp: , Rfl:     ELDERBERRY FRUIT ORAL, Take 5 mLs by mouth 2 (two) times daily., Disp: , Rfl:     esomeprazole magnesium (NEXIUM ORAL), Take 1 capsule by mouth once daily., Disp: , Rfl:     fish oil-omega-3 fatty acids 300-1,000 mg capsule, Take 1 g by mouth once daily., Disp: , Rfl:     fluticasone (FLONASE) 50 mcg/actuation nasal spray, 1 spray by Each Nostril route as needed., Disp: , Rfl: 6    LACTOBACILLUS ACIDOPHILUS (ACIDOPHILUS ORAL), Take 1 capsule by mouth once daily., Disp: , Rfl:     multivit with minerals/lutein (MULTIVITAMIN 50 PLUS ORAL), Take 1 tablet by mouth once daily., Disp: , Rfl:     ondansetron (ZOFRAN-ODT) 4 MG TbDL, Take 4  "mg by mouth as needed. , Disp: , Rfl:     polyethylene glycol (GLYCOLAX) 17 gram/dose powder, Take by mouth as needed. Pt taking 1 capful prn., Disp: , Rfl:     promethazine (PHENERGAN) 25 MG tablet, Take 1 tablet (25 mg total) by mouth every 6 (six) hours as needed for Nausea., Disp: 30 tablet, Rfl: 1    rosuvastatin (CRESTOR) 40 MG Tab, Take 1 tablet (40 mg total) by mouth once daily., Disp: 90 tablet, Rfl: 3    tretinoin (RETIN-A) 0.1 % cream, Apply topically nightly., Disp: , Rfl:     valsartan (DIOVAN) 320 MG tablet, Take 1 tablet (320 mg total) by mouth once daily., Disp: 90 tablet, Rfl: 3    vitamin E 400 UNIT capsule, Take 400 mg by mouth once daily., Disp: , Rfl:     SIMETHICONE (GAS-X ORAL), Take 1 tablet by mouth as needed. (Patient not taking: Reported on 10/11/2024), Disp: , Rfl:         Review of Systems:     Review of Systems   Constitutional:  Negative for chills and fever.   HENT:  Negative for ear pain and sore throat.    Respiratory:  Negative for cough, shortness of breath and wheezing.    Cardiovascular:  Negative for chest pain and palpitations.   Gastrointestinal:  Negative for constipation, diarrhea, nausea and vomiting.   Genitourinary:  Negative for dysuria and hematuria.   Musculoskeletal:  Negative for joint swelling and joint deformity.   Neurological:  Negative for dizziness and weakness.           Physical Exam:     Temp:             Blood Pressure:  106/66        Pulse:   63     Respirations:     Weight:   58.8 kg (129 lb 10.1 oz)  Height:   5' 4" (1.626 m)  BMI:     Body mass index is 22.25 kg/m².    Physical Exam  Constitutional:       General: She is not in acute distress.  HENT:      Right Ear: Tympanic membrane normal.      Left Ear: Tympanic membrane normal.      Nose: No congestion or rhinorrhea.      Mouth/Throat:      Pharynx: No oropharyngeal exudate or posterior oropharyngeal erythema.   Cardiovascular:      Rate and Rhythm: Normal rate and regular rhythm.   Pulmonary:      " Effort: Pulmonary effort is normal. No respiratory distress.      Breath sounds: No wheezing.   Skin:     General: Skin is warm.      Comments: Scattered small ecchymoses on arms and legs.   Neurological:      General: No focal deficit present.      Mental Status: She is alert and oriented to person, place, and time.           Labs/Imaging/Testing:     Most recent CBC:  Lab Results   Component Value Date    WBC 6.39 07/24/2024    HGB 13.2 07/24/2024     07/24/2024    MCV 97 07/24/2024       Most recent Lipids:  Lab Results   Component Value Date    CHOL 149 07/23/2024    HDL 72 07/23/2024    LDLCALC 57.4 (L) 07/23/2024    TRIG 98 07/23/2024       Most recent Chemistry:  Lab Results   Component Value Date     07/24/2024    K 3.8 07/24/2024     07/24/2024    CO2 23 07/24/2024    BUN 6 (L) 07/24/2024    CREATININE 0.8 07/24/2024    GLU 94 07/24/2024    CALCIUM 9.1 07/24/2024    ALT 13 07/24/2024    AST 19 07/24/2024    ALKPHOS 56 07/24/2024    PROT 6.1 07/24/2024    ALBUMIN 3.2 (L) 07/24/2024       Other tests:  Lab Results   Component Value Date    TSH 1.807 07/23/2024    FREET4 0.90 12/14/2023    INR 1.0 07/24/2024    HGBA1C 5.6 06/18/2024    TKPQCKYC29YP 54 06/18/2024    MG 1.8 07/24/2024    CRP 1.4 12/03/2014    SEDRATE 16 12/03/2014    LIPASE 25 12/10/2008    AMYLASE 72 12/10/2008     Assessment and Plan:     1. Senile purpura  - Assessed reported skin bruising (ecchymoses), likely related to aspirin use and age-related skin changes (senile purpura).  - Considered potential causes of bruising, including medication side effects and age-related skin changes.  - Will order labs to rule out any underlying issues, despite bruising likely being benign.  - Explained senile purpura: age-related skin changes leading to easy bruising due to loss of subcutaneous fat.  - Discussed aspirin's potential role in increased bruising.    - CBC Auto Differential; Future  - Comprehensive Metabolic Panel; Future  -  APTT; Future  - Protime-INR; Future    2. Essential hypertension  - CBC Auto Differential; Future  - Comprehensive Metabolic Panel; Future  - APTT; Future  - Protime-INR; Future    3. History of CVA (cerebrovascular accident)  - Assessed stroke history and current symptoms, noting good recovery without significant deficits.  - Evaluated blood pressure, finding it appropriate for post-stroke management.  - Clarified that current blood pressure is appropriate for post-stroke management.  - Patient to take it slow and deliberate when getting up to avoid lightheadedness.    4. Easy bruising  - CBC Auto Differential; Future  - Comprehensive Metabolic Panel; Future  - APTT; Future  - Protime-INR; Future     RSV VACCINATION:  - Educated on RSV vaccine availability for patients 75 and older, noting it is a 1-time dose available at pharmacies.    LABS:  - Ordered non-fasting labs to be done today.    Follow up in 6 mos or sooner if new problems.       Marco Doran MD  10/11/2024    This note was prepared using voice-recognition software.  Although efforts are made to proofread the note, some errors may persist in the final document.

## 2024-10-14 ENCOUNTER — TELEPHONE (OUTPATIENT)
Dept: INTERNAL MEDICINE | Facility: CLINIC | Age: 79
End: 2024-10-14
Payer: MEDICARE

## 2024-10-24 RX ORDER — AMLODIPINE BESYLATE 5 MG/1
TABLET ORAL
Qty: 90 TABLET | Refills: 3 | Status: SHIPPED | OUTPATIENT
Start: 2024-10-24

## 2024-10-28 PROBLEM — I63.312 CEREBROVASCULAR ACCIDENT (CVA) DUE TO THROMBOSIS OF LEFT MIDDLE CEREBRAL ARTERY: Status: RESOLVED | Noted: 2024-07-23 | Resolved: 2024-10-28

## 2024-11-01 NOTE — TELEPHONE ENCOUNTER
I spoke with the patient by telephone.  She had symptoms suspicious for TIA this morning.  Explained our recommendation regarding ED evaluation.  She declines such at this time.  She is on 81 mg aspirin daily.  No recurrent symptoms.  Advised if any recurrent symptoms she should proceed directly to the ED. I will see her in the office for evaluation and a workup she is agreeable she does state that she cannot tolerate MRI.  
20.9

## 2024-11-12 ENCOUNTER — HOSPITAL ENCOUNTER (OUTPATIENT)
Dept: RADIOLOGY | Facility: HOSPITAL | Age: 79
Discharge: HOME | End: 2024-11-12
Attending: INTERNAL MEDICINE
Payer: MEDICARE

## 2024-11-12 DIAGNOSIS — Z12.31 SCREENING MAMMOGRAM, ENCOUNTER FOR: ICD-10-CM

## 2024-11-12 PROCEDURE — 77063 BREAST TOMOSYNTHESIS BI: CPT | Mod: TC,HCNC,PO

## 2024-11-18 DIAGNOSIS — F41.9 ANXIETY: ICD-10-CM

## 2024-11-18 RX ORDER — BUTALBITAL, ACETAMINOPHEN AND CAFFEINE 50; 325; 40 MG/1; MG/1; MG/1
TABLET ORAL
COMMUNITY
Start: 2024-10-31

## 2024-11-18 RX ORDER — ALPRAZOLAM 0.25 MG/1
0.25 TABLET ORAL DAILY PRN
Qty: 20 TABLET | Refills: 1 | Status: SHIPPED | OUTPATIENT
Start: 2024-11-18

## 2024-11-18 NOTE — TELEPHONE ENCOUNTER
Unable to retrieve patient chart and identify care due.  [facility] Embedded Care Due Messages. Reference number: [id number].   11/18/2024 8:22:25 AM CST

## 2024-11-18 NOTE — TELEPHONE ENCOUNTER
----- Message from Concha sent at 11/18/2024  8:08 AM CST -----  Contact: [phone]  Pt needs a refill on ALPRAZolam (XANAX) 0.25 MG tablet  called into     [facility] - [address]  [address]  [place] [address]  Phone: [phone] Fax: [phone]      Pt mom/dad/guardian can be reached at [phone]    Nesha states the pharmacy has tried to get in touch with the provider on the RX getting refill. Please call Nesha back for advice

## 2025-01-09 ENCOUNTER — OFFICE VISIT (OUTPATIENT)
Dept: URGENT CARE | Facility: CLINIC | Age: 80
End: 2025-01-09
Payer: MEDICARE

## 2025-01-09 VITALS
WEIGHT: 129.63 LBS | BODY MASS INDEX: 22.13 KG/M2 | DIASTOLIC BLOOD PRESSURE: 80 MMHG | HEART RATE: 60 BPM | TEMPERATURE: 99 F | OXYGEN SATURATION: 95 % | SYSTOLIC BLOOD PRESSURE: 142 MMHG | RESPIRATION RATE: 16 BRPM | HEIGHT: 64 IN

## 2025-01-09 DIAGNOSIS — F41.9 ANXIETY: ICD-10-CM

## 2025-01-09 DIAGNOSIS — N39.0 ACUTE UTI: Primary | ICD-10-CM

## 2025-01-09 PROBLEM — K57.30 DVRTCLOS OF LG INT W/O PERFORATION OR ABSCESS W/O BLEEDING: Status: ACTIVE | Noted: 2019-01-15

## 2025-01-09 PROBLEM — M19.90 ARTHRITIS: Status: ACTIVE | Noted: 2025-01-09

## 2025-01-09 PROBLEM — L98.499 ULCER OF EXTERNAL EAR: Status: ACTIVE | Noted: 2025-01-09

## 2025-01-09 PROBLEM — D12.3 BENIGN NEOPLASM OF TRANSVERSE COLON: Status: ACTIVE | Noted: 2019-01-15

## 2025-01-09 PROBLEM — E78.00 HYPERCHOLESTEROLEMIA: Status: ACTIVE | Noted: 2025-01-09

## 2025-01-09 PROBLEM — D12.2 BENIGN NEOPLASM OF ASCENDING COLON: Status: ACTIVE | Noted: 2019-01-15

## 2025-01-09 PROBLEM — Z86.0100 HISTORY OF COLONIC POLYPS: Status: ACTIVE | Noted: 2025-01-09

## 2025-01-09 PROBLEM — K31.84 GASTROPARESIS: Status: ACTIVE | Noted: 2025-01-09

## 2025-01-09 PROBLEM — K31.7 POLYP OF STOMACH AND DUODENUM: Status: ACTIVE | Noted: 2019-01-15

## 2025-01-09 PROBLEM — K58.9 IRRITABLE BOWEL SYNDROME WITHOUT DIARRHEA: Status: ACTIVE | Noted: 2025-01-09

## 2025-01-09 LAB
BILIRUBIN, UA POC OHS: ABNORMAL
BLOOD, UA POC OHS: NEGATIVE
CLARITY, UA POC OHS: ABNORMAL
COLOR, UA POC OHS: ABNORMAL
GLUCOSE, UA POC OHS: >=1000
KETONES, UA POC OHS: NEGATIVE
LEUKOCYTES, UA POC OHS: ABNORMAL
NITRITE, UA POC OHS: POSITIVE
PH, UA POC OHS: 6.5
PROTEIN, UA POC OHS: 100
SPECIFIC GRAVITY, UA POC OHS: >=1.03
UROBILINOGEN, UA POC OHS: 2

## 2025-01-09 PROCEDURE — 99213 OFFICE O/P EST LOW 20 MIN: CPT | Mod: S$GLB,,, | Performed by: PHYSICIAN ASSISTANT

## 2025-01-09 PROCEDURE — 87086 URINE CULTURE/COLONY COUNT: CPT | Mod: HCNC | Performed by: PHYSICIAN ASSISTANT

## 2025-01-09 PROCEDURE — 81003 URINALYSIS AUTO W/O SCOPE: CPT | Mod: QW,S$GLB,, | Performed by: PHYSICIAN ASSISTANT

## 2025-01-09 RX ORDER — AMOXICILLIN 500 MG/1
500 CAPSULE ORAL 2 TIMES DAILY
COMMUNITY
Start: 2024-12-19 | End: 2025-01-09

## 2025-01-09 RX ORDER — CEFDINIR 300 MG/1
300 CAPSULE ORAL 2 TIMES DAILY
Qty: 14 CAPSULE | Refills: 0 | Status: SHIPPED | OUTPATIENT
Start: 2025-01-09 | End: 2025-01-16

## 2025-01-09 RX ORDER — ALPRAZOLAM 0.25 MG/1
0.25 TABLET ORAL DAILY PRN
Qty: 20 TABLET | Refills: 1 | Status: SHIPPED | OUTPATIENT
Start: 2025-01-09

## 2025-01-09 RX ORDER — IPRATROPIUM BROMIDE 42 UG/1
2 SPRAY, METERED NASAL 2 TIMES DAILY
COMMUNITY
Start: 2024-12-19

## 2025-01-09 NOTE — TELEPHONE ENCOUNTER
----- Message from Nanoiesha sent at 1/9/2025  8:47 AM CST -----  Contact: [phone]  Requesting an RX refill or new RX.    Is this a refill or new RX: Refill     RX name and strength (copy/paste from chart):  ALPRAZolam (XANAX) 0.25 MG tablet    Is this a 30 day or 90 day RX:     Pharmacy name and phone # (copy/paste from chart):    [facility] [place] - [address]  [address]  [place] [address]  Phone: [phone] Fax: [phone]      The doctors have asked that we provide their patients with the following 2 reminders -- prescription refills can take up to 72 hours, and a friendly reminder that in the future you can use your [facility] account to request refills: call back

## 2025-01-09 NOTE — PATIENT INSTRUCTIONS
If your condition worsens or fails to improve we recommend that you receive another evaluation at the ER immediately or contact your PCP to discuss your concerns or return here. You must understand that you've received an urgent care treatment only and that you may be released before all your medical problems are known or treated. You the patient will arrange for followup care as instructed.     If you had cultures done it will take 3-5 days to result. We will call you with the result.    Use home azo as directed this can turn your urine orange and will go away after discontinuing use.    Cranberry juice may help. Get the 100% cranberry juice and mix 4 oz of juice with 4 oz of water and drink this 8 oz glass of liquid once a day.   Increase water intake to at least 8-10 glasses/day.      Avoid caffeine, alcohol, or spicy foods as they irritate the bladder.      If symptoms do not improve in 2-3 days please return for evaluation

## 2025-01-09 NOTE — TELEPHONE ENCOUNTER
No care due was identified.  [facility] Embedded Care Due Messages. Reference number: [id number].   1/09/2025 8:51:26 AM CST

## 2025-01-09 NOTE — PROGRESS NOTES
"Subjective:      Patient ID: Nesha Landa is a 79 y.o. female.    Vitals:  height is 5' 4" (1.626 m) and weight is 58.8 kg (129 lb 10.1 oz). Her oral temperature is 98.5 °F (36.9 °C). Her blood pressure is 142/80 (abnormal) and her pulse is 60. Her respiration is 16 and oxygen saturation is 95%.     Chief Complaint: Urinary Frequency    This pt complains frequent urination x 1 day. S/S: burning. No nausea, vomiting, diarrhea, or fever.    Home tx: Azo     PMH: Pt states she has a past history of UTI's.     Urinary Frequency   This is a new problem. The current episode started yesterday. The problem has been unchanged. The quality of the pain is described as burning. The pain is at a severity of 10/10. The pain is severe. Associated symptoms include frequency and urgency. Pertinent negatives include no chills, discharge, flank pain, hematuria, nausea, vomiting or rash. Her past medical history is significant for recurrent UTIs.       Constitution: Negative for appetite change, chills and fever.   Cardiovascular:  Negative for chest pain.   Respiratory:  Negative for shortness of breath.    Gastrointestinal:  Negative for abdominal pain, nausea, vomiting and diarrhea.   Genitourinary:  Positive for dysuria, frequency and urgency. Negative for flank pain, hematuria, vaginal pain, vaginal discharge, vaginal bleeding and pelvic pain.   Musculoskeletal:  Negative for back pain and muscle ache.   Skin:  Negative for rash.      Past Medical History:   Diagnosis Date    Arthritis     Coronary arteriosclerosis     HTN (hypertension)     Hypercholesteremia     LBBB (left bundle branch block)     Osteopenia        Past Surgical History:   Procedure Laterality Date    CATARACT EXTRACTION      10 years+ OU    HYSTERECTOMY      partial colectomy         Family History   Problem Relation Name Age of Onset    Gallbladder disease Mother      Heart disease Mother      Coronary artery disease Father          MI -     Heart " disease Father      Heart disease Sister tung     Hyperlipidemia Sister tugn     No Known Problems Sister      Colon cancer Maternal Grandmother      Diabetes Maternal Grandmother      Diabetes Daughter      COPD Daughter      Diabetes Daughter      Drug abuse Daughter      No Known Problems Son      No Known Problems Son      No Known Problems Son      Amblyopia Neg Hx      Blindness Neg Hx      Cancer Neg Hx      Cataracts Neg Hx      Glaucoma Neg Hx      Hypertension Neg Hx      Macular degeneration Neg Hx      Retinal detachment Neg Hx      Strabismus Neg Hx      Stroke Neg Hx      Thyroid disease Neg Hx         Social History     Socioeconomic History    Marital status:    Occupational History     Employer: [employer]   Tobacco Use    Smoking status: Never     Passive exposure: Never    Smokeless tobacco: Never   Substance and Sexual Activity    Alcohol use: No    Drug use: No    Sexual activity: Not Currently     Social Drivers of Health     Financial Resource Strain: Low Risk  (7/24/2024)    Overall Financial Resource Strain (CARDIA)     Difficulty of Paying Living Expenses: Not hard at all   Food Insecurity: No Food Insecurity (7/24/2024)    Hunger Vital Sign     Worried About Running Out of Food in the Last Year: Never true     Ran Out of Food in the Last Year: Never true   Transportation Needs: No Transportation Needs (7/24/2024)    TRANSPORTATION NEEDS     Transportation : No   Physical Activity: Insufficiently Active (7/24/2024)    Exercise Vital Sign     Days of Exercise per Week: 3 days     Minutes of Exercise per Session: 20 min   Stress: No Stress Concern Present (7/24/2024)    [employer] [place] [employer] - Occupational Stress Questionnaire     Feeling of Stress : Only a little   Housing Stability: Low Risk  (7/24/2024)    Housing Stability Vital Sign     Unable to Pay for Housing in the Last Year: No     Homeless in the Last Year: No       Current Outpatient  Medications   Medication Sig Dispense Refill    ALPRAZolam (XANAX) 0.25 MG tablet Take 1 tablet (0.25 mg total) by mouth daily as needed for Anxiety. 20 tablet 1    amLODIPine (NORVASC) 5 MG tablet TAKE 1 TABLET EVERY DAY 90 tablet 3    ascorbic acid/collagen hydr (COLLAGEN PLUS VITAMIN C ORAL) Take 3 tablets by mouth once daily.      aspirin (ECOTRIN) 81 MG EC tablet Take 81 mg by mouth once daily.      butalbital-acetaminophen-caffeine -40 mg (FIORICET, ESGIC) -40 mg per tablet TAKE ONE TABLET BY MOUTH EVERY EIGHT TO TWELVE HOURS AS NEEDED FOR HEADACHE      carvediloL (COREG) 25 MG tablet TAKE 1 TABLET TWICE DAILY 180 tablet 3    cholecalciferol, vitamin D3, 1,000 unit capsule Take 1 capsule by mouth Daily.        coenzyme Q10 200 mg capsule Take 200 mg by mouth once daily.      cyanocobalamin, vitamin B-12, 50 mcg tablet Take 1 tablet by mouth Daily.        ELDERBERRY FRUIT ORAL Take 5 mLs by mouth 2 (two) times daily.      esomeprazole magnesium (NEXIUM ORAL) Take 1 capsule by mouth once daily.      fish oil-omega-3 fatty acids 300-1,000 mg capsule Take 1 g by mouth once daily.      fluticasone (FLONASE) 50 mcg/actuation nasal spray 1 spray by Each Nostril route as needed.  6    ipratropium (ATROVENT) 42 mcg (0.06 %) nasal spray 2 sprays by Each Nostril route 2 (two) times daily.      LACTOBACILLUS ACIDOPHILUS (ACIDOPHILUS ORAL) Take 1 capsule by mouth once daily.      multivit with minerals/lutein (MULTIVITAMIN 50 PLUS ORAL) Take 1 tablet by mouth once daily.      ondansetron (ZOFRAN-ODT) 4 MG TbDL Take 4 mg by mouth as needed.       polyethylene glycol (GLYCOLAX) 17 gram/dose powder Take by mouth as needed. Pt taking 1 capful prn.      promethazine (PHENERGAN) 25 MG tablet Take 1 tablet (25 mg total) by mouth every 6 (six) hours as needed for Nausea. 30 tablet 1    rosuvastatin (CRESTOR) 40 MG Tab Take 1 tablet (40 mg total) by mouth once daily. 90 tablet 3    SIMETHICONE (GAS-X ORAL) Take 1 tablet  by mouth as needed.      tretinoin (RETIN-A) 0.1 % cream Apply topically nightly.      valsartan (DIOVAN) 320 MG tablet Take 1 tablet (320 mg total) by mouth once daily. 90 tablet 3    vitamin E 400 UNIT capsule Take 400 mg by mouth once daily.      cefdinir (OMNICEF) 300 MG capsule Take 1 capsule (300 mg total) by mouth 2 (two) times daily. for 7 days 14 capsule 0     No current facility-administered medications for this visit.       Review of patient's allergies indicates:   Allergen Reactions    Ciprofloxacin Nausea Only    Sulfa (sulfonamide antibiotics)     Metronidazole Nausea Only and Nausea And Vomiting       Objective:     Physical Exam   Constitutional:  Non-toxic appearance. She does not appear ill. No distress.   Eyes: Conjunctivae are normal. No scleral icterus.   Cardiovascular: Normal rate, regular rhythm and normal heart sounds.   No murmur heard.Exam reveals no gallop and no friction rub.   Pulmonary/Chest: Effort normal and breath sounds normal. No stridor. No respiratory distress. She has no wheezes. She has no rhonchi. She has no rales.   Abdominal: Normal appearance and bowel sounds are normal. She exhibits no distension and no mass. Soft. flat abdomen There is no abdominal tenderness. There is no rebound, no guarding, no tenderness at McBurney's point, negative Barnett's sign, no left CVA tenderness, negative Rovsing's sign and no right CVA tenderness. No hernia.   Neurological: She is alert.   Skin: Skin is warm, dry, not diaphoretic and no rash.   Psychiatric: Her behavior is normal. Mood normal.   Nursing note and vitals reviewed.    Results for orders placed or performed in visit on 01/09/25   POCT Urinalysis(Instrument)    Collection Time: 01/09/25  8:24 AM   Result Value Ref Range    Color, POC UA Orange (A) Yellow, Straw, Colorless    Clarity, POC UA Cloudy (A) Clear    Glucose, POC UA >=1000 (A) Negative    Bilirubin, POC UA Small (A) Negative    Ketones, POC UA Negative Negative    Spec  Grav POC UA >=1.030 1.005 - 1.030    Blood, POC UA Negative Negative    pH, POC UA 6.5 5.0 - 8.0    Protein, POC  (A) Negative    Urobilinogen, POC UA 2.0 (A) <=1.0    Nitrite, POC UA Positive (A) Negative    WBC, POC UA Large (A) Negative       Assessment:     1. Acute UTI        Plan:       Acute UTI  -     POCT Urinalysis(Instrument)  -     Urine Culture High Risk  -     cefdinir (OMNICEF) 300 MG capsule; Take 1 capsule (300 mg total) by mouth 2 (two) times daily. for 7 days  Dispense: 14 capsule; Refill: 0              Results reviewed  I have reviewed the patient chart and pertinent past imaging/labs.  History of multidrug resistance    Patient Instructions   If your condition worsens or fails to improve we recommend that you receive another evaluation at the ER immediately or contact your PCP to discuss your concerns or return here. You must understand that you've received an urgent care treatment only and that you may be released before all your medical problems are known or treated. You the patient will arrange for followup care as instructed.     If you had cultures done it will take 3-5 days to result. We will call you with the result.    Use home azo as directed this can turn your urine orange and will go away after discontinuing use.    Cranberry juice may help. Get the 100% cranberry juice and mix 4 oz of juice with 4 oz of water and drink this 8 oz glass of liquid once a day.   Increase water intake to at least 8-10 glasses/day.      Avoid caffeine, alcohol, or spicy foods as they irritate the bladder.      If symptoms do not improve in 2-3 days please return for evaluation

## 2025-01-10 RX ORDER — CARVEDILOL 25 MG/1
TABLET ORAL
Qty: 180 TABLET | Refills: 3 | Status: SHIPPED | OUTPATIENT
Start: 2025-01-10

## 2025-01-11 LAB — BACTERIA UR CULT: NORMAL

## 2025-01-13 DIAGNOSIS — Z00.00 ENCOUNTER FOR MEDICARE ANNUAL WELLNESS EXAM: ICD-10-CM

## 2025-02-05 ENCOUNTER — LAB VISIT (OUTPATIENT)
Dept: LAB | Facility: HOSPITAL | Age: 80
End: 2025-02-05
Payer: MEDICARE

## 2025-02-05 DIAGNOSIS — J01.91 RECURRENT ACUTE SINUSITIS: ICD-10-CM

## 2025-02-05 LAB
IGA SERPL-MCNC: 181 MG/DL (ref 40–350)
IGG SERPL-MCNC: 663 MG/DL (ref 650–1600)
IGM SERPL-MCNC: 47 MG/DL (ref 50–300)

## 2025-02-05 PROCEDURE — 86581 STRPTCS PNEUM ANTB SEROT IA: CPT | Mod: HCNC | Performed by: ALLERGY & IMMUNOLOGY

## 2025-02-05 PROCEDURE — 82784 ASSAY IGA/IGD/IGG/IGM EACH: CPT | Mod: HCNC | Performed by: ALLERGY & IMMUNOLOGY

## 2025-02-05 PROCEDURE — 82787 IGG 1 2 3 OR 4 EACH: CPT | Mod: 59,HCNC | Performed by: ALLERGY & IMMUNOLOGY

## 2025-02-05 PROCEDURE — 82784 ASSAY IGA/IGD/IGG/IGM EACH: CPT | Mod: 59,HCNC | Performed by: ALLERGY & IMMUNOLOGY

## 2025-02-07 LAB
IMMUNOLOGIST REVIEW: NORMAL
S PN DA SERO 19F IGG SER-MCNC: 5.2 MCG/ML
S PNEUM DA 1 IGG SER-MCNC: 0.5 MCG/ML
S PNEUM DA 10A IGG SER-MCNC: 0.9 MCG/ML
S PNEUM DA 11A IGG SER-MCNC: 0.1 MCG/ML
S PNEUM DA 12F IGG SER-MCNC: 2.3 MCG/ML
S PNEUM DA 14 IGG SER-MCNC: 3.1 MCG/ML
S PNEUM DA 15B IGG SER-MCNC: 5.5 MCG/ML
S PNEUM DA 17F IGG SER-MCNC: 0.3 MCG/ML
S PNEUM DA 18C IGG SER-MCNC: 4.4 MCG/ML
S PNEUM DA 19A IGG SER-MCNC: NORMAL MCG/ML
S PNEUM DA 2 IGG SER-MCNC: 0.9 MCG/ML
S PNEUM DA 20A IGG SER-MCNC: 1 MCG/ML
S PNEUM DA 22F IGG SER-MCNC: 0.9 MCG/ML
S PNEUM DA 23F IGG SER-MCNC: 0.5 MCG/ML
S PNEUM DA 3 IGG SER-MCNC: 0.1 MCG/ML
S PNEUM DA 33F IGG SER-MCNC: 4 MCG/ML
S PNEUM DA 4 IGG SER-MCNC: 0.3 MCG/ML
S PNEUM DA 5 IGG SER-MCNC: 0.3 MCG/ML
S PNEUM DA 6B IGG SER-MCNC: 0.3 MCG/ML
S PNEUM DA 7F IGG SER-MCNC: 0.7 MCG/ML
S PNEUM DA 8 IGG SER-MCNC: 0.8 MCG/ML
S PNEUM DA 9N IGG SER-MCNC: 0.2 MCG/ML
S PNEUM DA 9V IGG SER-MCNC: 0.4 MCG/ML

## 2025-02-10 LAB
IGG1 SER-MCNC: 338 MG/DL (ref 382–929)
IGG2 SER-MCNC: 198 MG/DL (ref 242–700)
IGG3 SER-MCNC: 6 MG/DL (ref 22–176)
IGG4 SER-MCNC: 30 MG/DL (ref 4–86)

## 2025-03-19 DIAGNOSIS — F41.9 ANXIETY: ICD-10-CM

## 2025-03-19 NOTE — TELEPHONE ENCOUNTER
----- Message from Robert sent at 3/19/2025  3:41 PM CDT -----  Contact: Pt  [phone]  Requesting an RX refill or new RX.Is this a refill or new RX: RefillRX name and strength (copy/paste from chart):  ALPRAZolam (XANAX) 0.25 MG tabletIs this a 30 day or 90 day RX: [facility] [place] [address] Phone: [phone]: [phone] name and phone # (copy/paste from chart):  The doctors have asked that we provide their patients with the following 2 reminders -- prescription refills can take up to 72 hours, and a friendly reminder that in the future you can use your [facility] account to request refills: yes

## 2025-03-19 NOTE — TELEPHONE ENCOUNTER
No care due was identified.  [facility] Embedded Care Due Messages. Reference number: [id number].   3/19/2025 3:46:40 PM CDT

## 2025-03-20 RX ORDER — ALPRAZOLAM 0.25 MG/1
0.25 TABLET ORAL DAILY PRN
Qty: 20 TABLET | Refills: 1 | Status: SHIPPED | OUTPATIENT
Start: 2025-03-20

## 2025-04-09 ENCOUNTER — TELEPHONE (OUTPATIENT)
Dept: PRIMARY CARE CLINIC | Facility: CLINIC | Age: 80
End: 2025-04-09
Payer: MEDICARE

## 2025-04-09 NOTE — TELEPHONE ENCOUNTER
----- Message from Luz sent at 4/9/2025  1:56 PM CDT -----  Contact: PATIENT  [phone]  .[id number] Patient is calling for Medical Advice regarding:Symptom: High Blood Pressure - Caller ReportsOutcome: Schedule a same-day appointment or talk to a nurse or provider within 1 hour.Reason: Caller denied all higher acuity questionsThe caller accepted this outcome.How long has patient had these symptoms:Pharmacy name and phone#:Patient wants a call back or thru [facility]:CALL Comments:Please advise patient replies from provider may take up to 48 hours.

## 2025-04-09 NOTE — TELEPHONE ENCOUNTER
"ARELY - Pt wanted to let you know her bp was high when she went to an appt today and they told her her bp was high  "140s and some change". She was walking prior to the appt & they took bp immediately; she didn't sit down prior to and also isn't alarmed about readings.   "

## 2025-04-09 NOTE — TELEPHONE ENCOUNTER
Spoke to pt; she does have home cuff. Asked her to take bp am & pm for next week. She agreed and I will call her to receive them as she doesn't use mychart

## 2025-04-11 ENCOUNTER — OFFICE VISIT (OUTPATIENT)
Dept: INTERNAL MEDICINE | Facility: CLINIC | Age: 80
End: 2025-04-11
Payer: MEDICARE

## 2025-04-11 ENCOUNTER — TELEPHONE (OUTPATIENT)
Dept: INTERNAL MEDICINE | Facility: CLINIC | Age: 80
End: 2025-04-11

## 2025-04-11 VITALS — OXYGEN SATURATION: 96 % | HEIGHT: 64 IN | BODY MASS INDEX: 21.07 KG/M2 | RESPIRATION RATE: 12 BRPM | WEIGHT: 123.44 LBS

## 2025-04-11 DIAGNOSIS — I42.9 CARDIOMYOPATHY, UNSPECIFIED TYPE: ICD-10-CM

## 2025-04-11 DIAGNOSIS — Z00.00 ENCOUNTER FOR MEDICARE ANNUAL WELLNESS EXAM: Primary | ICD-10-CM

## 2025-04-11 DIAGNOSIS — E78.5 DYSLIPIDEMIA: ICD-10-CM

## 2025-04-11 DIAGNOSIS — E78.5 HYPERLIPIDEMIA WITH TARGET LDL LESS THAN 70: ICD-10-CM

## 2025-04-11 DIAGNOSIS — I10 ESSENTIAL HYPERTENSION: ICD-10-CM

## 2025-04-11 DIAGNOSIS — I77.89 ENLARGED THORACIC AORTA: ICD-10-CM

## 2025-04-11 DIAGNOSIS — I77.810 ASCENDING AORTA DILATION: ICD-10-CM

## 2025-04-11 PROBLEM — Z66 DNR (DO NOT RESUSCITATE): Chronic | Status: ACTIVE | Noted: 2024-07-24

## 2025-04-11 PROBLEM — D80.3 SELECTIVE DEFICIENCY OF IMMUNOGLOBULIN G (IGG) SUBCLASSES: Status: ACTIVE | Noted: 2025-04-11

## 2025-04-11 PROCEDURE — 99999 PR PBB SHADOW E&M-EST. PATIENT-LVL IV: CPT | Mod: PBBFAC,HCNC,,

## 2025-04-11 NOTE — TELEPHONE ENCOUNTER
"----- Message from Tahira sent at 4/11/2025  3:49 PM CDT -----  Contact: [phone] Patient  .1MEDICALADVICE Patient is calling for Medical Advice regarding: Pt states she is sorry for the "clock" 10:10. It just dawned on her that she put the wrong answer - she swears she's not losing her mind.How long has patient had these symptoms:Pharmacy name and phone#:[facility] a call back [facility]: call back, if necessaryComments:Please advise patient replies from provider may take up to 48 hours.  "

## 2025-04-11 NOTE — PATIENT INSTRUCTIONS
Care of infant assumed at bedside and POC reviewed. Parents without questions. Call light and bulb suction at bedside. Counseling and Referral of Other Preventative  (Italic type indicates deductible and co-insurance are waived)    Patient Name: Nesha Landa  Today's Date: 4/11/2025    Health Maintenance         Date Due Completion Date    RSV Vaccine (Age 60+ and Pregnant patients) (1 - 1-dose 75+ series) Never done ---    DEXA Scan 03/18/2024 3/18/2022    Lipid Panel 07/23/2029 7/23/2024    TETANUS VACCINE 05/28/2030 5/28/2020          No orders of the defined types were placed in this encounter.    The following information is provided to all patients.  This information is to help you find resources for any of the problems found today that may be affecting your health:                  Living healthy guide: www.[facility].[place].gov      Understanding Diabetes: www.diabetes.org      Eating healthy: www.cdc.gov/healthyweight      [facility] home safety checklist: www.cdc.gov/steadi/patient.html      Agency on Aging: www.goea.[place].[facility]      Alcoholics anonymous (AA): www.aa.org      Physical Activity: www.nohemi.nih.gov/xh4oupl      Tobacco use: www.quitwithusla.org

## 2025-04-11 NOTE — PROGRESS NOTES
"  Nesha Landa presented for a follow-up Medicare AWV today. The following components were reviewed and updated:    Medical history  Family History  Social history  Allergies and Current Medications  Health Risk Assessment  Health Maintenance  Care Team    **See Completed Assessments for Annual Wellness visit with in the encounter summary    The following assessments were completed:  Depression Screening  Cognitive function Screening    Timed Get Up Test  Whisper Test      Opioid documentation:      Patient does not have a current opioid prescription.          Vitals:    04/11/25 0904   Resp: 12   SpO2: 96%   Weight: 56 kg (123 lb 7.3 oz)   Height: 5' 4" (1.626 m)     Body mass index is 21.19 kg/m².       Physical Exam  Vitals reviewed.   Constitutional:       Appearance: Normal appearance.   HENT:      Head: Normocephalic and atraumatic.   Cardiovascular:      Rate and Rhythm: Normal rate and regular rhythm.      Pulses: Normal pulses.           Radial pulses are 2+ on the right side and 2+ on the left side.        Dorsalis pedis pulses are 2+ on the right side and 2+ on the left side.        Posterior tibial pulses are 2+ on the right side and 2+ on the left side.      Heart sounds: Normal heart sounds.   Pulmonary:      Effort: Pulmonary effort is normal.      Breath sounds: Normal breath sounds.   Musculoskeletal:      Right lower leg: No edema.      Left lower leg: No edema.   Skin:     General: Skin is warm and dry.      Capillary Refill: Capillary refill takes less than 2 seconds.   Neurological:      General: No focal deficit present.      Mental Status: She is alert and oriented to person, place, and time.   Psychiatric:         Mood and Affect: Mood normal.         Behavior: Behavior normal.       Diagnoses and health risks identified today and associated recommendations/orders:  1. Encounter for Medicare annual wellness exam  Assessment and evaluation performed as stated above    2. Cardiomyopathy, " unspecified type  Stable.  Currently taking amlodipine, valsartan, carvedilol.  Follow-up with cardiology    3. Enlarged thoracic aorta  4. Ascending aorta dilation  Stable on aspirin.  Follow-up with Cardiology    5. Dyslipidemia  6. Hyperlipidemia with target LDL less than 70  Stable on rosuvastatin.  Encouraged patient to adhere to a low-fat low-cholesterol diet.  Follow-up with Cardiology    7. Essential hypertension  Stable on amlodipine, valsartan.  Encouraged patient to follow a low-salt diet.  Follow-up with Cardiology        Provided Nesha with a 5-10 year written screening schedule and personal prevention plan. Recommendations were developed using the USPSTF age appropriate recommendations. Education, counseling, and referrals were provided as needed.  After Visit Summary printed and given to patient which includes a list of additional screenings\tests needed.    Follow up in about 1 year (around 4/11/2026), or if symptoms worsen or fail to improve.      Ashwini Castellanos, NP  I offered to discuss advanced care planning, including how to pick a person who would make decisions for you if you were unable to make them for yourself, called a health care power of , and what kind of decisions you might make such as use of life sustaining treatments such as ventilators and tube feeding when faced with a life limiting illness recorded on a living will that they will need to know. (How you want to be cared for as you near the end of your natural life)     X  Patient has advanced directives written and agrees to provide copies to the institution.

## 2025-04-17 ENCOUNTER — TELEPHONE (OUTPATIENT)
Dept: PRIMARY CARE CLINIC | Facility: CLINIC | Age: 80
End: 2025-04-17
Payer: MEDICARE

## 2025-04-17 RX ORDER — AMLODIPINE BESYLATE 10 MG/1
10 TABLET ORAL DAILY
Qty: 90 TABLET | Refills: 1 | Status: SHIPPED | OUTPATIENT
Start: 2025-04-17

## 2025-04-17 NOTE — TELEPHONE ENCOUNTER
Please see previous message of blood pressure readings.    Pt expecting a call from you, her PCP as she is aware that we are not in office tomorrow.     Please advise of her B/P readings.

## 2025-04-17 NOTE — TELEPHONE ENCOUNTER
Reviewed BP readings - not at goal.  ON valsartan 320, Amlodipine 5 mg and carvedilol 25 bid  Will recommend increasing amlodipine to 10 mg daily - I sent new rx to the pharamcy  Please advise patient of changes.

## 2025-04-17 NOTE — TELEPHONE ENCOUNTER
----- Message from Francisco sent at 4/17/2025  3:16 PM CDT -----  Contact: [phone]  .1MEDICALADVICE Patient is calling for Medical Advice regarding: Pt said she left a messages about her BP this morning and never got a call back and she knows we are closed tomorrow so she was trying to get a call back today How long has patient had these symptoms:Pharmacy name and phone#: [facility] - [place] - [address] [place] [address]Ngqlo: [phone] Fax: [phone] wants a call back or [facility]: call back Comments:Please advise patient replies from provider may take up to 48 hours.

## 2025-04-17 NOTE — TELEPHONE ENCOUNTER
----- Message from Lexi sent at 4/17/2025  8:08 AM CDT -----  Contact: Self/ [phone]  .1MEDICALADVICE Patient is calling for Medical Advice regarding:Blood pressure readings How long has patient had these symptoms:NAPharmacy name and phone#:Nanci wants a call back or [facility]: Call back   Comments:  pt said that she is calling report her blood pressure readings:    4/12 at 9:00 am  154/89 pulse 58, at 6:00 pm 143/79 pulse 57     4/13 11:00 am 147 /77 pulse 61     4/14 at 8:00 am 150/88 pulse 51, at 7:00 pm 166/87 pulse 61     4/15  141 /83 56 pulse, at 6:30 pm 144/63 pulse 61    4/16 at 9:30 am 153/82 pulse 63     4/17 at 6:00 am 151/90 pulse  61.

## 2025-04-23 ENCOUNTER — OFFICE VISIT (OUTPATIENT)
Dept: URGENT CARE | Facility: CLINIC | Age: 80
End: 2025-04-23
Payer: MEDICARE

## 2025-04-23 VITALS
SYSTOLIC BLOOD PRESSURE: 128 MMHG | HEIGHT: 64 IN | DIASTOLIC BLOOD PRESSURE: 74 MMHG | RESPIRATION RATE: 18 BRPM | WEIGHT: 123 LBS | BODY MASS INDEX: 21 KG/M2 | TEMPERATURE: 99 F | HEART RATE: 68 BPM | OXYGEN SATURATION: 95 %

## 2025-04-23 DIAGNOSIS — R10.9 ABDOMINAL PAIN, UNSPECIFIED ABDOMINAL LOCATION: ICD-10-CM

## 2025-04-23 DIAGNOSIS — N39.0 URINARY TRACT INFECTION WITH HEMATURIA, SITE UNSPECIFIED: Primary | ICD-10-CM

## 2025-04-23 DIAGNOSIS — R31.9 URINARY TRACT INFECTION WITH HEMATURIA, SITE UNSPECIFIED: Primary | ICD-10-CM

## 2025-04-23 LAB
BILIRUBIN, UA POC OHS: NEGATIVE
BLOOD, UA POC OHS: ABNORMAL
CLARITY, UA POC OHS: ABNORMAL
COLOR, UA POC OHS: YELLOW
GLUCOSE, UA POC OHS: NEGATIVE
KETONES, UA POC OHS: NEGATIVE
LEUKOCYTES, UA POC OHS: ABNORMAL
NITRITE, UA POC OHS: POSITIVE
PH, UA POC OHS: 7
PROTEIN, UA POC OHS: 30
SPECIFIC GRAVITY, UA POC OHS: 1.02
UROBILINOGEN, UA POC OHS: 0.2

## 2025-04-23 PROCEDURE — 87086 URINE CULTURE/COLONY COUNT: CPT | Mod: HCNC | Performed by: FAMILY MEDICINE

## 2025-04-23 RX ORDER — LIDOCAINE HYDROCHLORIDE 10 MG/ML
1 INJECTION, SOLUTION INFILTRATION; PERINEURAL
Status: COMPLETED | OUTPATIENT
Start: 2025-04-23 | End: 2025-04-23

## 2025-04-23 RX ORDER — CEFTRIAXONE 250 MG/1
250 INJECTION, POWDER, FOR SOLUTION INTRAMUSCULAR; INTRAVENOUS ONCE
Status: COMPLETED | OUTPATIENT
Start: 2025-04-23 | End: 2025-04-23

## 2025-04-23 RX ORDER — HYOSCYAMINE SULFATE 0.125 MG
125 TABLET ORAL 2 TIMES DAILY PRN
Qty: 14 TABLET | Refills: 0 | Status: SHIPPED | OUTPATIENT
Start: 2025-04-23 | End: 2025-05-23

## 2025-04-23 RX ORDER — NITROFURANTOIN 25; 75 MG/1; MG/1
100 CAPSULE ORAL 2 TIMES DAILY
Qty: 14 CAPSULE | Refills: 0 | Status: SHIPPED | OUTPATIENT
Start: 2025-04-23

## 2025-04-23 RX ORDER — CEPHALEXIN 500 MG/1
500 CAPSULE ORAL EVERY 8 HOURS
Qty: 30 CAPSULE | Refills: 0 | Status: SHIPPED | OUTPATIENT
Start: 2025-04-23 | End: 2025-04-23

## 2025-04-23 RX ADMIN — CEFTRIAXONE 250 MG: 250 INJECTION, POWDER, FOR SOLUTION INTRAMUSCULAR; INTRAVENOUS at 09:04

## 2025-04-23 RX ADMIN — LIDOCAINE HYDROCHLORIDE 1 ML: 10 INJECTION, SOLUTION INFILTRATION; PERINEURAL at 09:04

## 2025-04-23 NOTE — PROGRESS NOTES
Subjective:      Patient ID: Nesha Landa is a 79 y.o. female.    Vitals:  vitals were not taken for this visit.     Chief Complaint: Abdominal Pain    This is a 79 y.o. female who presents today with a chief complaint of abdominal pain. Abdominal pain started last night.  Patient is unable to described the quality of pain. Patient expressed that she is unable to function because of the pain.             Abdominal Pain  This is a new problem. The onset quality is sudden. The problem occurs constantly. The problem has been unchanged. The pain is located in the LLQ. The pain is at a severity of 10/10. The pain is severe. The abdominal pain does not radiate. Associated symptoms include nausea. The pain is relieved by Nothing. She has tried nothing for the symptoms. The treatment provided no relief.     Gastrointestinal:  Positive for abdominal pain and nausea.   Skin:  Negative for erythema.    Objective:     Physical Exam   Constitutional: She is oriented to person, place, and time. She does not appear ill. No distress. normal  HENT:   Head: Normocephalic and atraumatic.   Ears:   Right Ear: External ear normal.   Left Ear: External ear normal.   Nose: No rhinorrhea or congestion.   Mouth/Throat: No oropharyngeal exudate or posterior oropharyngeal erythema.   Eyes: Conjunctivae are normal. Pupils are equal, round, and reactive to light. Extraocular movement intact   Neck: Neck supple. No neck rigidity present.   Cardiovascular: Normal rate, regular rhythm, normal heart sounds and normal pulses.   No murmur heard.  Pulmonary/Chest: Effort normal and breath sounds normal. No respiratory distress. She has no rhonchi.   Abdominal: Normal appearance and bowel sounds are normal. She exhibits no distension and no mass. Soft. flat abdomen There is abdominal tenderness. There is no rebound and no guarding. No hernia.   Musculoskeletal: Normal range of motion.         General: No deformity or signs of injury. Normal range of  motion.   Neurological: no focal deficit. She is alert, oriented to person, place, and time and at baseline.   Skin: Skin is warm and dry. Capillary refill takes less than 2 seconds. No bruising and No erythema   Psychiatric: Her behavior is normal. Mood, judgment and thought content normal.   Nursing note and vitals reviewed.    Assessment:     Plan:   1. Abdominal pain, unspecified abdominal location  - POCT Urinalysis(Instrument)  - hyoscyamine (ANASPAZ,LEVSIN) 0.125 mg Tab; Take 1 tablet (125 mcg total) by mouth 2 (two) times daily as needed.  Dispense: 14 tablet; Refill: 0    2. Urinary tract infection with hematuria, site unspecified  - LIDOcaine HCL 10 mg/ml (1%) injection 1 mL  - nitrofurantoin, macrocrystal-monohydrate, (MACROBID) 100 MG capsule; Take 1 capsule (100 mg total) by mouth 2 (two) times daily.  Dispense: 14 capsule; Refill: 0  - Urine Culture High Risk   Results discussed with pt will contact pt with  Pening culture results when available

## 2025-04-26 LAB — BACTERIA UR CULT: NORMAL

## 2025-04-27 ENCOUNTER — RESULTS FOLLOW-UP (OUTPATIENT)
Dept: URGENT CARE | Facility: CLINIC | Age: 80
End: 2025-04-27
Payer: MEDICARE

## 2025-04-29 ENCOUNTER — TELEPHONE (OUTPATIENT)
Dept: PRIMARY CARE CLINIC | Facility: CLINIC | Age: 80
End: 2025-04-29
Payer: MEDICARE

## 2025-04-29 ENCOUNTER — LAB VISIT (OUTPATIENT)
Dept: LAB | Facility: HOSPITAL | Age: 80
End: 2025-04-29
Attending: STUDENT IN AN ORGANIZED HEALTH CARE EDUCATION/TRAINING PROGRAM
Payer: MEDICARE

## 2025-04-29 ENCOUNTER — OFFICE VISIT (OUTPATIENT)
Dept: INTERNAL MEDICINE | Facility: CLINIC | Age: 80
End: 2025-04-29
Payer: MEDICARE

## 2025-04-29 VITALS
SYSTOLIC BLOOD PRESSURE: 144 MMHG | HEIGHT: 64 IN | OXYGEN SATURATION: 97 % | DIASTOLIC BLOOD PRESSURE: 76 MMHG | BODY MASS INDEX: 21.34 KG/M2 | WEIGHT: 125 LBS | HEART RATE: 66 BPM

## 2025-04-29 DIAGNOSIS — R11.0 NAUSEA: ICD-10-CM

## 2025-04-29 DIAGNOSIS — R11.0 INTRACTABLE NAUSEA: Primary | ICD-10-CM

## 2025-04-29 DIAGNOSIS — R11.0 INTRACTABLE NAUSEA: ICD-10-CM

## 2025-04-29 DIAGNOSIS — R10.9 ABDOMINAL PAIN, UNSPECIFIED ABDOMINAL LOCATION: ICD-10-CM

## 2025-04-29 PROCEDURE — 1101F PT FALLS ASSESS-DOCD LE1/YR: CPT | Mod: CPTII,S$GLB,, | Performed by: STUDENT IN AN ORGANIZED HEALTH CARE EDUCATION/TRAINING PROGRAM

## 2025-04-29 PROCEDURE — 3078F DIAST BP <80 MM HG: CPT | Mod: CPTII,S$GLB,, | Performed by: STUDENT IN AN ORGANIZED HEALTH CARE EDUCATION/TRAINING PROGRAM

## 2025-04-29 PROCEDURE — 85027 COMPLETE CBC AUTOMATED: CPT | Mod: HCNC

## 2025-04-29 PROCEDURE — 99214 OFFICE O/P EST MOD 30 MIN: CPT | Mod: S$GLB,,, | Performed by: STUDENT IN AN ORGANIZED HEALTH CARE EDUCATION/TRAINING PROGRAM

## 2025-04-29 PROCEDURE — 1126F AMNT PAIN NOTED NONE PRSNT: CPT | Mod: CPTII,S$GLB,, | Performed by: STUDENT IN AN ORGANIZED HEALTH CARE EDUCATION/TRAINING PROGRAM

## 2025-04-29 PROCEDURE — 36415 COLL VENOUS BLD VENIPUNCTURE: CPT | Mod: HCNC

## 2025-04-29 PROCEDURE — 83690 ASSAY OF LIPASE: CPT | Mod: HCNC

## 2025-04-29 PROCEDURE — 3077F SYST BP >= 140 MM HG: CPT | Mod: CPTII,S$GLB,, | Performed by: STUDENT IN AN ORGANIZED HEALTH CARE EDUCATION/TRAINING PROGRAM

## 2025-04-29 PROCEDURE — 1159F MED LIST DOCD IN RCRD: CPT | Mod: CPTII,S$GLB,, | Performed by: STUDENT IN AN ORGANIZED HEALTH CARE EDUCATION/TRAINING PROGRAM

## 2025-04-29 PROCEDURE — 80053 COMPREHEN METABOLIC PANEL: CPT | Mod: HCNC

## 2025-04-29 PROCEDURE — 3288F FALL RISK ASSESSMENT DOCD: CPT | Mod: CPTII,S$GLB,, | Performed by: STUDENT IN AN ORGANIZED HEALTH CARE EDUCATION/TRAINING PROGRAM

## 2025-04-29 PROCEDURE — 99999 PR PBB SHADOW E&M-EST. PATIENT-LVL V: CPT | Mod: PBBFAC,,, | Performed by: STUDENT IN AN ORGANIZED HEALTH CARE EDUCATION/TRAINING PROGRAM

## 2025-04-29 PROCEDURE — 1160F RVW MEDS BY RX/DR IN RCRD: CPT | Mod: CPTII,S$GLB,, | Performed by: STUDENT IN AN ORGANIZED HEALTH CARE EDUCATION/TRAINING PROGRAM

## 2025-04-29 RX ORDER — ONDANSETRON 8 MG/1
8 TABLET, ORALLY DISINTEGRATING ORAL EVERY 8 HOURS PRN
Qty: 20 TABLET | Refills: 0 | Status: SHIPPED | OUTPATIENT
Start: 2025-04-29

## 2025-04-29 NOTE — TELEPHONE ENCOUNTER
----- Message from Tech Amaiheather sent at 4/29/2025 10:41 AM CDT -----  Contact: [phone]  .1MEDICALADVICE Patient is calling for Medical Advice regarding: pt is severely nauseous and wants to be seen asapPatient wants a call back or [facility]: callComments:Please advise patient replies from provider may take up to 48 hours.

## 2025-04-29 NOTE — TELEPHONE ENCOUNTER
----- Message from Laurel sent at 4/29/2025 10:02 AM CDT -----  Contact: Patient [phone]  .1MEDICALADVICE Patient is calling for Medical Advice regarding:patient reports ongoing Nausea & WeaknessHow long has patient had these symptoms:1 weekPharmacy name and phone#:[facility] - [place] - [address] [place] [address]Jfqwg: [phone] Fax: [phone] Patient wants a call back or [facility]: Patient [phone]:Please advise patient replies from provider may take up to 48 hours.

## 2025-04-29 NOTE — PROGRESS NOTES
SUBJECTIVE     Chief Complaint   Patient presents with    Nausea       HPI  Nesha Landa is a 79 y.o. female with HTN, HLD, LBBB, CAD, ascending aortic dilation, h/o ischemic left MCA stroke, osteoporosis, selective deficiency of IgG subclasses, gastroparesis, GERD, h/o gastric ulcer that presents for evaluation of nausea.     History of Present Illness    CHIEF COMPLAINT:  Patient presents today for nausea and vomiting    GASTROINTESTINAL SYMPTOMS:  She reports persistent nausea that started last week, causing gagging but no vomiting. The nausea continues despite medication use, making eating difficult and causing weakness, though she can tolerate liquids. She experiences constant bloating with belching and stomach spasms. She denies current abdominal pain, though she had experienced it previously. Gallbladder ultrasound was negative. She reports difficulty controlling bowels with a recent episode of diarrhea on Saturday. Since then, she has passed only a small amount of stool.    CURRENT MEDICATIONS:  She is taking Phenergan for gas (noting decreased effectiveness during current illness), Zofran for nausea (providing mild improvement), and Esomeprazole (Nexium). She is completing treatment with Macrobid for UTI with one remaining dose.    ACTIVE INFECTIONS:  She has a current sinus infection treated with an antibiotic shot. She declined oral antibiotics to avoid medication interactions. She was recently diagnosed with a urinary tract infection via urinalysis.    WEIGHT:  She reports unintentional weight loss.         PAST MEDICAL HISTORY:  Past Medical History:   Diagnosis Date    Arthritis     Coronary arteriosclerosis     HTN (hypertension)     Hypercholesteremia     LBBB (left bundle branch block)     Osteopenia        PAST SURGICAL HISTORY:  Past Surgical History:   Procedure Laterality Date    CATARACT EXTRACTION      10 years+ OU    HYSTERECTOMY      partial colectomy         FAMILY HISTORY:  Family History  "  Problem Relation Name Age of Onset    Gallbladder disease Mother      Heart disease Mother      Coronary artery disease Father          MI -     Heart disease Father      Heart disease Sister tung     Hyperlipidemia Sister tung     No Known Problems Sister      Colon cancer Maternal Grandmother      Diabetes Maternal Grandmother      Diabetes Daughter      COPD Daughter      Diabetes Daughter      Drug abuse Daughter      No Known Problems Son      No Known Problems Son      No Known Problems Son      Amblyopia Neg Hx      Blindness Neg Hx      Cancer Neg Hx      Cataracts Neg Hx      Glaucoma Neg Hx      Hypertension Neg Hx      Macular degeneration Neg Hx      Retinal detachment Neg Hx      Strabismus Neg Hx      Stroke Neg Hx      Thyroid disease Neg Hx         ALLERGIES AND MEDICATIONS: updated and reviewed.  Review of patient's allergies indicates:   Allergen Reactions    Ciprofloxacin Nausea Only    Sulfa (sulfonamide antibiotics)     Cephalexin Diarrhea     Causes diarrhea    Metronidazole Nausea Only and Nausea And Vomiting     Current Medications[1]    ROS  Review of Systems   Constitutional:  Positive for fatigue and unexpected weight change. Negative for chills and fever.   HENT:  Positive for congestion and sinus pressure. Negative for postnasal drip, rhinorrhea, sore throat and trouble swallowing.    Respiratory:  Positive for cough.    Gastrointestinal:  Positive for abdominal distention, diarrhea, nausea and vomiting. Negative for abdominal pain, anal bleeding, blood in stool and rectal pain.   Neurological:  Positive for weakness.         OBJECTIVE     Physical Exam  Vitals:    25 1500   BP: (!) 144/76   Pulse: 66    Body mass index is 21.46 kg/m².  Weight: 56.7 kg (125 lb)   Height: 5' 4" (162.6 cm)     Physical Exam  Constitutional:       Appearance: Normal appearance.   Cardiovascular:      Rate and Rhythm: Normal rate and regular rhythm.      Pulses: Normal pulses.      Heart " sounds: Normal heart sounds. No murmur heard.     No friction rub. No gallop.   Pulmonary:      Effort: Pulmonary effort is normal. No respiratory distress.      Breath sounds: Normal breath sounds.   Chest:      Chest wall: No tenderness.   Abdominal:      General: Bowel sounds are normal. There is no distension.      Palpations: There is no mass.      Tenderness: There is generalized abdominal tenderness. There is no guarding or rebound.   Musculoskeletal:         General: Normal range of motion.      Right lower leg: No edema.      Left lower leg: No edema.   Skin:     General: Skin is warm.   Neurological:      General: No focal deficit present.      Mental Status: She is alert and oriented to person, place, and time.   Psychiatric:         Mood and Affect: Mood normal.           ASSESSMENT     79 y.o. female with     1. Intractable nausea    2. Abdominal pain, unspecified abdominal location    3. Nausea        PLAN:     1. Intractable nausea  - Continues despite conservative management. Obtain CT abdomen/pelvis.   - CBC Without Differential; Future  - Comprehensive Metabolic Panel; Future  - LIPASE; Future  - CT Abdomen Pelvis  Without Contrast; Future  - ondansetron (ZOFRAN-ODT) 8 MG TbDL; Take 1 tablet (8 mg total) by mouth every 8 (eight) hours as needed (nausea).  Dispense: 20 tablet; Refill: 0  - Urinalysis, Reflex to Urine Culture; Future    2. Abdominal pain, unspecified abdominal location  - ED precautions given. Work up below.   - CBC Without Differential; Future  - Comprehensive Metabolic Panel; Future  - LIPASE; Future  - CT Abdomen Pelvis  Without Contrast; Future  - ondansetron (ZOFRAN-ODT) 8 MG TbDL; Take 1 tablet (8 mg total) by mouth every 8 (eight) hours as needed (nausea).  Dispense: 20 tablet; Refill: 0  - Urinalysis, Reflex to Urine Culture; Future    3. Nausea  - Prescription for higher dose of Zofran sent to pharmacy.       RTC PRN       Paul Foster MD  Family Medicine  [facility]  06/13/2025  This note was generated with the assistance of ambient listening technology. Verbal consent was obtained by the patient and accompanying visitor(s) for the recording of patient appointment to facilitate this note. I attest to having reviewed and edited the generated note for accuracy, though some syntax or spelling errors may persist. Please contact the author of this note for any clarification.      Follow up if symptoms worsen or fail to improve.                       [1]   Current Outpatient Medications   Medication Sig Dispense Refill    amLODIPine (NORVASC) 10 MG tablet Take 1 tablet (10 mg total) by mouth once daily. 90 tablet 1    ascorbic acid/collagen hydr (COLLAGEN PLUS VITAMIN C ORAL) Take 3 tablets by mouth once daily.      aspirin (ECOTRIN) 81 MG EC tablet Take 81 mg by mouth once daily.      butalbital-acetaminophen-caffeine -40 mg (FIORICET, ESGIC) -40 mg per tablet as needed.      carvediloL (COREG) 25 MG tablet TAKE 1 TABLET TWICE DAILY 180 tablet 3    cholecalciferol, vitamin D3, 1,000 unit capsule Take 1 capsule by mouth Daily.        coenzyme Q10 200 mg capsule Take 200 mg by mouth once daily.      cyanocobalamin, vitamin B-12, 50 mcg tablet Take 1 tablet by mouth Daily.        ELDERBERRY FRUIT ORAL Take 5 mLs by mouth as needed.      esomeprazole magnesium (NEXIUM ORAL) Take 1 capsule by mouth once daily.      fish oil-omega-3 fatty acids 300-1,000 mg capsule Take 1 g by mouth once daily.      fluticasone (FLONASE) 50 mcg/actuation nasal spray 1 spray by Each Nostril route as needed.  6    hyoscyamine (ANASPAZ,LEVSIN) 0.125 mg Tab Take 1 tablet (125 mcg total) by mouth 2 (two) times daily as needed. 14 tablet 0    ipratropium (ATROVENT) 42 mcg (0.06 %) nasal spray 2 sprays by Each Nostril route 2 (two) times daily.      LACTOBACILLUS ACIDOPHILUS (ACIDOPHILUS ORAL) Take 1 capsule by mouth once daily.      multivit with minerals/lutein  (MULTIVITAMIN 50 PLUS ORAL) Take 1 tablet by mouth once daily.      polyethylene glycol (GLYCOLAX) 17 gram/dose powder Take by mouth as needed. Pt taking 1 capful prn.      promethazine (PHENERGAN) 25 MG tablet Take 1 tablet (25 mg total) by mouth every 6 (six) hours as needed for Nausea. 30 tablet 1    rosuvastatin (CRESTOR) 40 MG Tab Take 1 tablet (40 mg total) by mouth once daily. 90 tablet 3    SIMETHICONE (GAS-X ORAL) Take 1 tablet by mouth as needed.      tretinoin (RETIN-A) 0.1 % cream Apply topically nightly.      valsartan (DIOVAN) 320 MG tablet Take 1 tablet (320 mg total) by mouth once daily. 90 tablet 3    vitamin E 400 UNIT capsule Take 400 mg by mouth once daily.      ALPRAZolam (XANAX) 0.25 MG tablet Take 1 tablet (0.25 mg total) by mouth daily as needed for Anxiety. 20 tablet 1    amoxicillin (AMOXIL) 500 MG capsule Take 500 mg by mouth as needed.      azelastine (ASTELIN) 137 mcg (0.1 %) nasal spray 2 sprays as needed.      brompheniramine-pseudoeph-DM (BROMFED DM) 2-30-10 mg/5 mL Syrp as needed.      ondansetron (ZOFRAN-ODT) 8 MG TbDL Take 1 tablet (8 mg total) by mouth every 8 (eight) hours as needed (nausea). 20 tablet 0     No current facility-administered medications for this visit.

## 2025-04-30 LAB
ALBUMIN SERPL BCP-MCNC: 3 G/DL (ref 3.5–5.2)
ALP SERPL-CCNC: 72 UNIT/L (ref 40–150)
ALT SERPL W/O P-5'-P-CCNC: 17 UNIT/L (ref 10–44)
ANION GAP (OHS): 9 MMOL/L (ref 8–16)
AST SERPL-CCNC: 20 UNIT/L (ref 11–45)
BILIRUB SERPL-MCNC: 0.3 MG/DL (ref 0.1–1)
BUN SERPL-MCNC: 5 MG/DL (ref 8–23)
CALCIUM SERPL-MCNC: 9.2 MG/DL (ref 8.7–10.5)
CHLORIDE SERPL-SCNC: 100 MMOL/L (ref 95–110)
CO2 SERPL-SCNC: 25 MMOL/L (ref 23–29)
CREAT SERPL-MCNC: 0.7 MG/DL (ref 0.5–1.4)
ERYTHROCYTE [DISTWIDTH] IN BLOOD BY AUTOMATED COUNT: 12.4 % (ref 11.5–14.5)
GFR SERPLBLD CREATININE-BSD FMLA CKD-EPI: >60 ML/MIN/1.73/M2
GLUCOSE SERPL-MCNC: 95 MG/DL (ref 70–110)
HCT VFR BLD AUTO: 37 % (ref 37–48.5)
HGB BLD-MCNC: 11.9 GM/DL (ref 12–16)
LIPASE SERPL-CCNC: 23 U/L (ref 4–60)
MCH RBC QN AUTO: 31.1 PG (ref 27–31)
MCHC RBC AUTO-ENTMCNC: 32.2 G/DL (ref 32–36)
MCV RBC AUTO: 97 FL (ref 82–98)
PLATELET # BLD AUTO: 327 K/UL (ref 150–450)
PMV BLD AUTO: 10.6 FL (ref 9.2–12.9)
POTASSIUM SERPL-SCNC: 4.1 MMOL/L (ref 3.5–5.1)
PROT SERPL-MCNC: 6.6 GM/DL (ref 6–8.4)
RBC # BLD AUTO: 3.83 M/UL (ref 4–5.4)
SODIUM SERPL-SCNC: 134 MMOL/L (ref 136–145)
WBC # BLD AUTO: 6.99 K/UL (ref 3.9–12.7)

## 2025-05-01 ENCOUNTER — TELEPHONE (OUTPATIENT)
Dept: INTERNAL MEDICINE | Facility: CLINIC | Age: 80
End: 2025-05-01
Payer: MEDICARE

## 2025-05-01 ENCOUNTER — RESULTS FOLLOW-UP (OUTPATIENT)
Dept: INTERNAL MEDICINE | Facility: CLINIC | Age: 80
End: 2025-05-01

## 2025-05-01 ENCOUNTER — HOSPITAL ENCOUNTER (OUTPATIENT)
Dept: RADIOLOGY | Facility: HOSPITAL | Age: 80
Discharge: HOME | End: 2025-05-01
Attending: STUDENT IN AN ORGANIZED HEALTH CARE EDUCATION/TRAINING PROGRAM
Payer: MEDICARE

## 2025-05-01 DIAGNOSIS — R91.1 SOLITARY PULMONARY NODULE: Primary | ICD-10-CM

## 2025-05-01 DIAGNOSIS — R10.9 ABDOMINAL PAIN, UNSPECIFIED ABDOMINAL LOCATION: ICD-10-CM

## 2025-05-01 DIAGNOSIS — R11.0 INTRACTABLE NAUSEA: ICD-10-CM

## 2025-05-01 PROCEDURE — 74176 CT ABD & PELVIS W/O CONTRAST: CPT | Mod: TC

## 2025-05-01 PROCEDURE — 74176 CT ABD & PELVIS W/O CONTRAST: CPT | Mod: 26,,, | Performed by: RADIOLOGY

## 2025-05-01 NOTE — TELEPHONE ENCOUNTER
Talked with pt to advice recent blood test results. the pt understands and will follow up with PCP

## 2025-05-01 NOTE — TELEPHONE ENCOUNTER
Patient called. Results of blood work and CT reviewed. Will place order for CT chest in 12 months due to incidental pulmonary nodule.   Recommended follow up with her PCP, has appointment tomorrow.     Patient verbalized understanding and was in agreement with this plan.

## 2025-05-01 NOTE — TELEPHONE ENCOUNTER
----- Message from Paul Foster MD sent at 5/1/2025  7:56 AM CDT -----  Please let patient know that her recent blood tests showed a slight decrease in the blood count, but otherwise the rest of her labs look stable. I recommend completing the CT as ordered at her   appointment and following up with her PCP.   ----- Message -----  From: Lab, Background User  Sent: 4/30/2025  12:21 AM CDT  To: Paul Foster MD

## 2025-05-02 ENCOUNTER — OFFICE VISIT (OUTPATIENT)
Dept: PRIMARY CARE CLINIC | Facility: CLINIC | Age: 80
End: 2025-05-02
Payer: MEDICARE

## 2025-05-02 VITALS — HEIGHT: 64 IN | HEART RATE: 66 BPM | OXYGEN SATURATION: 95 % | WEIGHT: 123.44 LBS | BODY MASS INDEX: 21.07 KG/M2

## 2025-05-02 DIAGNOSIS — R91.1 LUNG NODULE: ICD-10-CM

## 2025-05-02 DIAGNOSIS — I10 ESSENTIAL HYPERTENSION: Primary | ICD-10-CM

## 2025-05-02 DIAGNOSIS — K30 DELAYED GASTRIC EMPTYING: ICD-10-CM

## 2025-05-02 DIAGNOSIS — R11.0 NAUSEA: ICD-10-CM

## 2025-05-02 DIAGNOSIS — R91.1 SOLITARY PULMONARY NODULE: ICD-10-CM

## 2025-05-02 PROBLEM — L98.499 ULCER OF EXTERNAL EAR: Status: RESOLVED | Noted: 2025-01-09 | Resolved: 2025-05-02

## 2025-05-02 PROBLEM — D80.3 SELECTIVE DEFICIENCY OF IMMUNOGLOBULIN G (IGG) SUBCLASSES: Status: RESOLVED | Noted: 2025-04-11 | Resolved: 2025-05-02

## 2025-05-02 PROCEDURE — 99999 PR PBB SHADOW E&M-EST. PATIENT-LVL III: CPT | Mod: PBBFAC,,, | Performed by: INTERNAL MEDICINE

## 2025-05-02 RX ORDER — AMOXICILLIN 500 MG/1
500 CAPSULE ORAL
COMMUNITY
Start: 2025-04-30

## 2025-05-02 RX ORDER — BROMPHENIRAMINE MALEATE, PSEUDOEPHEDRINE HYDROCHLORIDE, AND DEXTROMETHORPHAN HYDROBROMIDE 2; 30; 10 MG/5ML; MG/5ML; MG/5ML
SYRUP ORAL
COMMUNITY
Start: 2025-04-10

## 2025-05-02 RX ORDER — AZELASTINE 1 MG/ML
2 SPRAY, METERED NASAL
COMMUNITY
Start: 2025-03-12

## 2025-05-02 NOTE — PROGRESS NOTES
[facility]  5/2/2025          Reason for Visit:     Chencho had concerns including Nausea and Fatigue.       History of Present Illness:     Patient presents today for follow up of CT, blood pressure, and urinalysis results    Nausea/ UTI?  She reports nausea starting 2 weeks ago coinciding with a possible urinary tract infection. Gastric emptying study confirmed delayed gastric emptying, as she did not fully digest the test meal after 4 hours. She has experienced gradual weight loss of approximately 10 lbs over the past year.  She has recent CT abdomen/pelvis, and ultrasound in October, all for same problem of nausea.    She experienced a urinary tract infection 2 weeks ago with associated nausea but denies urinary symptoms at time of diagnosis.   Though urinalysis looked abnormal, Urine culture grew multiple organisms, none in predominance.  She is not having any current urinary symptoms.    Lung Nodule  CT showed an incidental lung nodule. Ultrasound revealed a clinically insignificant kidney cyst, as determined by specialist.    HTN  Valsartan 320  Amlodipine 10 mg  Reviewed home bp readings - they are looking a bit better since increasing the dose.  Bp here today is ok.      Problem List:     Problem List[1]      Medications:     Current Medications[2]      Review of Systems:     Review of Systems   Constitutional:  Negative for chills and fever.   HENT:  Negative for ear pain and sore throat.    Respiratory:  Negative for cough, shortness of breath and wheezing.    Cardiovascular:  Negative for chest pain and palpitations.   Gastrointestinal:  Negative for constipation, diarrhea, nausea and vomiting.   Genitourinary:  Negative for dysuria and hematuria.   Musculoskeletal:  Negative for joint swelling and joint deformity.   Neurological:  Negative for dizziness and weakness.         Physical Exam:     Temp:             Blood Pressure:  (P) 130/60        Pulse:   66     Respirations:    "  Weight:   56 kg (123 lb 7.3 oz)  Height:   5' 4" (1.626 m)  BMI:     Body mass index is 21.19 kg/m².    Physical Exam  Constitutional:       General: She is not in acute distress.  HENT:      Right Ear: Tympanic membrane normal.      Left Ear: Tympanic membrane normal.      Nose: No congestion or rhinorrhea.      Mouth/Throat:      Pharynx: No oropharyngeal exudate or posterior oropharyngeal erythema.   Cardiovascular:      Rate and Rhythm: Normal rate and regular rhythm.   Pulmonary:      Effort: Pulmonary effort is normal. No respiratory distress.      Breath sounds: No wheezing.   Abdominal:      Palpations: Abdomen is soft.      Tenderness: There is no abdominal tenderness.   Skin:     General: Skin is warm.   Neurological:      General: No focal deficit present.      Mental Status: She is alert and oriented to person, place, and time.       Labs/Imaging/Testing:     Most recent CBC:  Lab Results   Component Value Date    WBC 6.99 04/29/2025    HGB 11.9 (L) 04/29/2025     04/29/2025    MCV 97 04/29/2025       Most recent Lipids:  Lab Results   Component Value Date    CHOL 149 07/23/2024    HDL 72 07/23/2024    LDLCALC 57.4 (L) 07/23/2024    TRIG 98 07/23/2024       Most recent Chemistry:  Lab Results   Component Value Date     (L) 04/29/2025    K 4.1 04/29/2025     04/29/2025    CO2 25 04/29/2025    BUN 5 (L) 04/29/2025    CREATININE 0.7 04/29/2025    GLU 95 04/29/2025    CALCIUM 9.2 04/29/2025    ALT 17 04/29/2025    AST 20 04/29/2025    ALKPHOS 72 04/29/2025    PROT 6.6 04/29/2025    ALBUMIN 3.0 (L) 04/29/2025       Other tests:  Lab Results   Component Value Date    TSH 1.807 07/23/2024    FREET4 0.90 12/14/2023    INR 1.0 10/11/2024    HGBA1C 5.6 06/18/2024    [id number] 54 06/18/2024    MG 1.8 07/24/2024    CRP 1.4 12/03/2014    SEDRATE 16 12/03/2014    LIPASE 23 04/29/2025    AMYLASE 72 12/10/2008       Assessment and Plan:     Visit Summary:  Reviewed recent CT, labs, and urine " culture results.  Noted mild anemia, low sodium, and low albumin, likely due to decreased appetite from nausea.  Ruled out UTI as potential cause of nausea based on urine culture showing multiple organisms without predominance, suggesting possible contamination.  Incidental finding of small lung nodule (<1 cm) on CT Chest, not immediately concerning.  Chronic nausea likely due to gastroparesis, as evidenced by previous gastric emptying study showing very slow digestive system.  BP readings show fluctuations but overall acceptable control on current medication regimen.    - Order CT Chest in 1 year for follow-up on incidental lung nodule  - Continue current 10 mg dose of amlodipine for blood pressure management  - Maintain prescribed medication for nausea  - Implement gastroparesis diet: smaller, frequent meals, avoid high-fat foods and insoluble fiber  - Examine ear to identify potential causes of tinnitus  - Follow-up CT Chest in April next year       1. Essential hypertension  - Blood pressure readings fluctuate, with today's reading at 130/60, the lowest recorded.  - Assessment indicates blood pressure is not perfect but acceptable.  - Will continue current 10 mg dose of amlodipine for management after reviewing medication adjustment history.    2. Delayed gastric emptying  - Identified gastroparesis as the likely cause of chronic nausea, confirmed through gastric emptying test showing delayed digestion.  - Discussed management strategy including gastroparesis diet - eating smaller, more frequent meals while avoiding high-fat foods and insoluble fiber.  - Patient advised to continue treating nausea with prescribed medication alongside these dietary adjustments.    3. Nausea  Continue antiemetics prn    4. Lung nodule  - Identified small lung nodule (less than 1 cm) not requiring immediate intervention.  - Ordered CT Chest in 1 year (April of next year) to follow up on this incidental finding.    - Patient reports  nausea beginning with UTI; urine culture showed multiple organisms.  - No urinary symptoms currently present, raising questions about the UTI diagnosis due to lack of symptoms.    - Ultrasound revealed a small kidney cyst, which has been evaluated by a specialist and deemed not concerning.  - No immediate action required.           Marco Doran MD  5/2/2025    This note was prepared using voice-recognition software.  Although efforts are made to proofread the note, some errors may persist in the final document.         [1]   Patient Active Problem List  Diagnosis    Essential hypertension    Hyperlipidemia with target LDL less than 70    LBBB (left bundle branch block)    Coronary artery disease due to calcified coronary lesion    Vitamin D deficiency    Bradycardia - asymptomatic    Postmenopausal osteoporosis    Dyslipidemia    Gastroesophageal reflux disease    Insomnia    Enlarged thoracic aorta    Ascending aorta dilation    Goals of care, counseling/discussion    Reduced mobility    DNR (do not resuscitate)    Sensory loss    H/O ischemic left MCA stroke    Hypercholesterolemia    Ulcer of esophagus without bleeding    Irritable bowel syndrome without diarrhea    Internal hemorrhoids without complication    History of colonic polyps    Gastroparesis    Gastric ulcer, unspecified as acute or chronic, without hemorrhage or perforation    Dvrtclos of lg int w/o perforation or abscess w/o bleeding    Diverticulosis of colon    Polyp of stomach and duodenum    Benign neoplasm of transverse colon    Benign neoplasm of ascending colon    Arthritis    Anxiety disorder    Diaphragmatic hernia without obstruction or gangrene    Cardiomyopathy, unspecified type    Urinary tract infection with hematuria    Abdominal pain   [2]   Current Outpatient Medications:     ALPRAZolam (XANAX) 0.25 MG tablet, Take 1 tablet (0.25 mg total) by mouth daily as needed for Anxiety., Disp: 20 tablet, Rfl: 1    amLODIPine (NORVASC) 10  MG tablet, Take 1 tablet (10 mg total) by mouth once daily., Disp: 90 tablet, Rfl: 1    amoxicillin (AMOXIL) 500 MG capsule, Take 500 mg by mouth as needed., Disp: , Rfl:     ascorbic acid/collagen hydr (COLLAGEN PLUS VITAMIN C ORAL), Take 3 tablets by mouth once daily., Disp: , Rfl:     aspirin (ECOTRIN) 81 MG EC tablet, Take 81 mg by mouth once daily., Disp: , Rfl:     azelastine (ASTELIN) 137 mcg (0.1 %) nasal spray, 2 sprays as needed., Disp: , Rfl:     brompheniramine-pseudoeph-DM (BROMFED DM) 2-30-10 mg/5 mL Syrp, as needed., Disp: , Rfl:     butalbital-acetaminophen-caffeine -40 mg (FIORICET, ESGIC) -40 mg per tablet, as needed., Disp: , Rfl:     carvediloL (COREG) 25 MG tablet, TAKE 1 TABLET TWICE DAILY, Disp: 180 tablet, Rfl: 3    cholecalciferol, vitamin D3, 1,000 unit capsule, Take 1 capsule by mouth Daily.  , Disp: , Rfl:     coenzyme Q10 200 mg capsule, Take 200 mg by mouth once daily., Disp: , Rfl:     cyanocobalamin, vitamin B-12, 50 mcg tablet, Take 1 tablet by mouth Daily.  , Disp: , Rfl:     ELDERBERRY FRUIT ORAL, Take 5 mLs by mouth as needed., Disp: , Rfl:     esomeprazole magnesium (NEXIUM ORAL), Take 1 capsule by mouth once daily., Disp: , Rfl:     fish oil-omega-3 fatty acids 300-1,000 mg capsule, Take 1 g by mouth once daily., Disp: , Rfl:     fluticasone (FLONASE) 50 mcg/actuation nasal spray, 1 spray by Each Nostril route as needed., Disp: , Rfl: 6    hyoscyamine (ANASPAZ,LEVSIN) 0.125 mg Tab, Take 1 tablet (125 mcg total) by mouth 2 (two) times daily as needed., Disp: 14 tablet, Rfl: 0    ipratropium (ATROVENT) 42 mcg (0.06 %) nasal spray, 2 sprays by Each Nostril route 2 (two) times daily., Disp: , Rfl:     LACTOBACILLUS ACIDOPHILUS (ACIDOPHILUS ORAL), Take 1 capsule by mouth once daily., Disp: , Rfl:     multivit with minerals/lutein (MULTIVITAMIN 50 PLUS ORAL), Take 1 tablet by mouth once daily., Disp: , Rfl:     ondansetron (ZOFRAN-ODT) 8 MG TbDL, Take 1 tablet (8 mg total)  by mouth every 8 (eight) hours as needed (nausea)., Disp: 20 tablet, Rfl: 0    polyethylene glycol (GLYCOLAX) 17 gram/dose powder, Take by mouth as needed. Pt taking 1 capful prn., Disp: , Rfl:     promethazine (PHENERGAN) 25 MG tablet, Take 1 tablet (25 mg total) by mouth every 6 (six) hours as needed for Nausea., Disp: 30 tablet, Rfl: 1    rosuvastatin (CRESTOR) 40 MG Tab, Take 1 tablet (40 mg total) by mouth once daily., Disp: 90 tablet, Rfl: 3    SIMETHICONE (GAS-X ORAL), Take 1 tablet by mouth as needed., Disp: , Rfl:     tretinoin (RETIN-A) 0.1 % cream, Apply topically nightly., Disp: , Rfl:     valsartan (DIOVAN) 320 MG tablet, Take 1 tablet (320 mg total) by mouth once daily., Disp: 90 tablet, Rfl: 3    vitamin E 400 UNIT capsule, Take 400 mg by mouth once daily., Disp: , Rfl:

## 2025-05-15 DIAGNOSIS — F41.9 ANXIETY: ICD-10-CM

## 2025-05-15 RX ORDER — ALPRAZOLAM 0.25 MG/1
0.25 TABLET ORAL DAILY PRN
Qty: 20 TABLET | Refills: 1 | Status: SHIPPED | OUTPATIENT
Start: 2025-05-15

## 2025-05-15 NOTE — TELEPHONE ENCOUNTER
Care Due:                  Date            Visit Type   Department     Provider  --------------------------------------------------------------------------------                                EP -                              PRIMARY      [facility]  Last Visit: 05-      CARE ([facility])   KIRA Doran  Next Visit: None Scheduled  None         None Found                                                            Last  Test          Frequency    Reason                     Performed    Due Date  --------------------------------------------------------------------------------    Lipid Panel.  12 months..  rosuvastatin.............  07- 07-    [facility] Embedded Care Due Messages. Reference number: [id number].   5/15/2025 11:08:22 AM CDT

## 2025-05-15 NOTE — TELEPHONE ENCOUNTER
----- Message from Madi sent at 5/15/2025 11:02 AM CDT -----  Contact: pt @ [phone]  Requesting an RX refill or new RX. Is this a refill or new RX: refill RX name and strength (copy/paste from chart):  ALPRAZolam (XANAX) 0.25 MG tablet Is this a 30 day or 90 day RX: Pharmacy name and phone # (copy/paste from chart):  [facility] - [place] - [address] [place] [id number]: [phone] Fax: [phone] doctors have asked that we provide their patients with the following 2 reminders -- prescription refills can take up to 72 hours, and a friendly reminder that in the future you can use your [facility] account to request refills: yes   full weight-bearing

## 2025-05-20 ENCOUNTER — TELEPHONE (OUTPATIENT)
Dept: OPTOMETRY | Facility: CLINIC | Age: 80
End: 2025-05-20
Payer: MEDICARE

## 2025-05-20 NOTE — TELEPHONE ENCOUNTER
----- Message from Vikram Cerrato sent at 5/20/2025  3:43 PM CDT -----    ----- Message -----  From: Zahira Montes  Sent: 5/20/2025   3:23 PM CDT  To: Siddharth PAYTON Staff    Type:  Sooner Apoointment RequestCaller is requesting a sooner appointment.  Name of Caller:Doreen is the first available appointment?JuneSymptoms:annual Would the patient rather a call back or a response via [facility]? Call Best Call Back Number: [phone] Information:

## 2025-05-30 ENCOUNTER — OFFICE VISIT (OUTPATIENT)
Dept: URGENT CARE | Facility: CLINIC | Age: 80
End: 2025-05-30
Payer: MEDICARE

## 2025-05-30 VITALS
OXYGEN SATURATION: 95 % | HEIGHT: 64 IN | BODY MASS INDEX: 21 KG/M2 | WEIGHT: 123 LBS | SYSTOLIC BLOOD PRESSURE: 138 MMHG | RESPIRATION RATE: 16 BRPM | TEMPERATURE: 98 F | DIASTOLIC BLOOD PRESSURE: 82 MMHG | HEART RATE: 55 BPM

## 2025-05-30 DIAGNOSIS — S80.212A ABRASION, LEFT KNEE, INITIAL ENCOUNTER: ICD-10-CM

## 2025-05-30 DIAGNOSIS — T14.8XXA SKIN AVULSION: Primary | ICD-10-CM

## 2025-05-30 RX ORDER — MUPIROCIN 20 MG/G
OINTMENT TOPICAL
Status: COMPLETED | OUTPATIENT
Start: 2025-05-30 | End: 2025-05-30

## 2025-05-30 RX ORDER — MUPIROCIN 20 MG/G
OINTMENT TOPICAL 2 TIMES DAILY
Qty: 1 G | Refills: 0 | Status: SHIPPED | OUTPATIENT
Start: 2025-05-30 | End: 2025-06-06

## 2025-05-30 RX ADMIN — MUPIROCIN: 20 OINTMENT TOPICAL at 03:05

## 2025-05-30 NOTE — PROGRESS NOTES
"Subjective:      Patient ID: Nesha Landa is a 79 y.o. female.    Vitals:  height is 5' 4" (1.626 m) and weight is 55.8 kg (123 lb). Her oral temperature is 98.1 °F (36.7 °C). Her blood pressure is 138/82 and her pulse is 55 (abnormal). Her respiration is 16 and oxygen saturation is 95%.     Chief Complaint: Leg Injury    This is a 79 y.o. female who presents today with a chief complaint of hitting her L knee going up the steps today.  Patient states that she had a steroid injection in his knee on Wednesday.  There was already contusions present from that.  Patient denies any bony tenderness pain with walking.  Rates discomfort as mild.  Patient states it happened on concrete there was no foreign body.  Last tetanus May 28th 2020      Leg Pain   The incident occurred 3 to 6 hours ago. The incident occurred at home. The injury mechanism was a fall. The pain is present in the left knee. The pain is mild. Pertinent negatives include no inability to bear weight, loss of motion, loss of sensation, muscle weakness, numbness or tingling. She reports no foreign bodies present.     Constitution: Negative for chills and fever.   HENT:  Negative for facial trauma.    Musculoskeletal:  Positive for pain and trauma. Negative for joint pain, joint swelling, abnormal ROM of joint and muscle ache.   Skin:  Positive for abrasion, bruising and avulsion. Negative for rash and erythema.   Neurological:  Negative for loss of consciousness, numbness and tingling.      Past Medical History:   Diagnosis Date    Arthritis     Coronary arteriosclerosis     HTN (hypertension)     Hypercholesteremia     LBBB (left bundle branch block)     Osteopenia        Past Surgical History:   Procedure Laterality Date    CATARACT EXTRACTION      10 years+ OU    HYSTERECTOMY      partial colectomy         Family History   Problem Relation Name Age of Onset    Gallbladder disease Mother      Heart disease Mother      Coronary artery disease Father          " MI -     Heart disease Father      Heart disease Sister tung     Hyperlipidemia Sister tung     No Known Problems Sister      Colon cancer Maternal Grandmother      Diabetes Maternal Grandmother      Diabetes Daughter      COPD Daughter      Diabetes Daughter      Drug abuse Daughter      No Known Problems Son      No Known Problems Son      No Known Problems Son      Amblyopia Neg Hx      Blindness Neg Hx      Cancer Neg Hx      Cataracts Neg Hx      Glaucoma Neg Hx      Hypertension Neg Hx      Macular degeneration Neg Hx      Retinal detachment Neg Hx      Strabismus Neg Hx      Stroke Neg Hx      Thyroid disease Neg Hx         Social History     Socioeconomic History    Marital status:    Occupational History     Employer: [employer]   Tobacco Use    Smoking status: Never     Passive exposure: Never    Smokeless tobacco: Never   Substance and Sexual Activity    Alcohol use: No    Drug use: No    Sexual activity: Not Currently     Social Drivers of Health     Financial Resource Strain: Medium Risk (2025)    Overall Financial Resource Strain (CARDIA)     Difficulty of Paying Living Expenses: Somewhat hard   Food Insecurity: No Food Insecurity (2025)    Hunger Vital Sign     Worried About Running Out of Food in the Last Year: Never true     Ran Out of Food in the Last Year: Never true   Transportation Needs: No Transportation Needs (2025)    PRAPARE - Transportation     Lack of Transportation (Medical): No     Lack of Transportation (Non-Medical): No   Physical Activity: Insufficiently Active (2025)    Exercise Vital Sign     Days of Exercise per Week: 7 days     Minutes of Exercise per Session: 10 min   Stress: No Stress Concern Present (2025)    [employer] - Occupational Stress Questionnaire     Feeling of Stress : Only a little   Housing Stability: Low Risk  (2025)    Housing Stability Vital Sign     Unable to Pay for Housing  in the Last Year: No     Number of Times Moved in the Last Year: 0     Homeless in the Last Year: No       Current Medications[1]    Review of patient's allergies indicates:   Allergen Reactions    Ciprofloxacin Nausea Only    Sulfa (sulfonamide antibiotics)     Cephalexin Diarrhea     Causes diarrhea    Metronidazole Nausea Only and Nausea And Vomiting       Objective:     Physical Exam   Constitutional:  Non-toxic appearance. She does not appear ill. No distress.   Cardiovascular: Normal rate, regular rhythm and normal pulses.   Pulmonary/Chest: Effort normal. No respiratory distress.   Abdominal: Normal appearance.   Musculoskeletal: Normal range of motion.         General: Swelling present. Normal range of motion.   Neurological: no focal deficit. She is alert. She displays no weakness. No sensory deficit.   Skin: Skin is warm, dry, not diaphoretic and not pale. Capillary refill takes less than 2 seconds. No bruising and No erythema        Nursing note and vitals reviewed.      Assessment:     1. Skin avulsion    2. Abrasion, left knee, initial encounter        Plan:       Skin avulsion  -     Tdap (BOOSTRIX) vaccine injection 0.5 mL  -     mupirocin (BACTROBAN) 2 % ointment; Apply topically 2 (two) times daily. for 7 days  Dispense: 1 g; Refill: 0  -     mupirocin 2 % ointment    Abrasion, left knee, initial encounter  -     Tdap (BOOSTRIX) vaccine injection 0.5 mL        I have reviewed the patient chart and pertinent past imaging/labs.      Patient was given nonstick dressing to use at home  The wound was cleaned with Hibiclens and saline x 30 times with 3 cc syringe.  No foreign body.  Unable to approximate the wound the skin was not curled up underneath.  It appears there is a chunk of skin missing.  Discussed with patient not ideal for laceration repair with suture material she has good understanding that it will heal from the bottom up.  She is aware of red flags to be on the look out for.  No indication  for x-ray today no bony tenderness.  Patient Instructions   The wound is cleansed, debrided of foreign material as much as possible, and dressed. Be alert for any signs of infection (redness, pus, pain, increased swelling or fever) and call if such occurs. Keep the wound clean and dry. You may remove the bandage to shower as usual after the first 24 hours to prevent crusting over suture knots, but do not soak the area in water (no tubs or swimming).  After removing the bandage, wash the area with soap and water at least twice daily unless more frequently soiled, then clean more often. Clean old antibiotic ointment away before new application.  After 48 hours, then leave open to air and do not bandage. Do not use neosporin/polysporin, hydrogen peroxide or alcohol. You were prescribed topical bactroban for a antimicrobial.  All wounds will leave a scar. To prevent the scar from bleaching in the sun make sure to wear sunscreen in the sun after the wound has healed. Do not apply a great deal of tension or stress to the suture line. Tetanus vaccination status reviewed: Tdap vaccination indicated and given today.  Tylenol with food as needed for pain relief. Follow up with urgent care as needed                [1]   Current Outpatient Medications   Medication Sig Dispense Refill    ALPRAZolam (XANAX) 0.25 MG tablet Take 1 tablet (0.25 mg total) by mouth daily as needed for Anxiety. 20 tablet 1    amLODIPine (NORVASC) 10 MG tablet Take 1 tablet (10 mg total) by mouth once daily. 90 tablet 1    amoxicillin (AMOXIL) 500 MG capsule Take 500 mg by mouth as needed.      ascorbic acid/collagen hydr (COLLAGEN PLUS VITAMIN C ORAL) Take 3 tablets by mouth once daily.      aspirin (ECOTRIN) 81 MG EC tablet Take 81 mg by mouth once daily.      azelastine (ASTELIN) 137 mcg (0.1 %) nasal spray 2 sprays as needed.      brompheniramine-pseudoeph-DM (BROMFED DM) 2-30-10 mg/5 mL Syrp as needed.      butalbital-acetaminophen-caffeine  -40 mg (FIORICET, ESGIC) -40 mg per tablet as needed.      carvediloL (COREG) 25 MG tablet TAKE 1 TABLET TWICE DAILY 180 tablet 3    cholecalciferol, vitamin D3, 1,000 unit capsule Take 1 capsule by mouth Daily.        coenzyme Q10 200 mg capsule Take 200 mg by mouth once daily.      cyanocobalamin, vitamin B-12, 50 mcg tablet Take 1 tablet by mouth Daily.        ELDERBERRY FRUIT ORAL Take 5 mLs by mouth as needed.      esomeprazole magnesium (NEXIUM ORAL) Take 1 capsule by mouth once daily.      fish oil-omega-3 fatty acids 300-1,000 mg capsule Take 1 g by mouth once daily.      fluticasone (FLONASE) 50 mcg/actuation nasal spray 1 spray by Each Nostril route as needed.  6    ipratropium (ATROVENT) 42 mcg (0.06 %) nasal spray 2 sprays by Each Nostril route 2 (two) times daily.      LACTOBACILLUS ACIDOPHILUS (ACIDOPHILUS ORAL) Take 1 capsule by mouth once daily.      multivit with minerals/lutein (MULTIVITAMIN 50 PLUS ORAL) Take 1 tablet by mouth once daily.      ondansetron (ZOFRAN-ODT) 8 MG TbDL Take 1 tablet (8 mg total) by mouth every 8 (eight) hours as needed (nausea). 20 tablet 0    polyethylene glycol (GLYCOLAX) 17 gram/dose powder Take by mouth as needed. Pt taking 1 capful prn.      promethazine (PHENERGAN) 25 MG tablet Take 1 tablet (25 mg total) by mouth every 6 (six) hours as needed for Nausea. 30 tablet 1    rosuvastatin (CRESTOR) 40 MG Tab Take 1 tablet (40 mg total) by mouth once daily. 90 tablet 3    SIMETHICONE (GAS-X ORAL) Take 1 tablet by mouth as needed.      tretinoin (RETIN-A) 0.1 % cream Apply topically nightly.      valsartan (DIOVAN) 320 MG tablet Take 1 tablet (320 mg total) by mouth once daily. 90 tablet 3    vitamin E 400 UNIT capsule Take 400 mg by mouth once daily.      hyoscyamine (ANASPAZ,LEVSIN) 0.125 mg Tab Take 1 tablet (125 mcg total) by mouth 2 (two) times daily as needed. 14 tablet 0    mupirocin (BACTROBAN) 2 % ointment Apply topically 2 (two) times daily. for 7 days  1 g 0     No current facility-administered medications for this visit.

## 2025-05-30 NOTE — PATIENT INSTRUCTIONS
The wound is cleansed, debrided of foreign material as much as possible, and dressed. Be alert for any signs of infection (redness, pus, pain, increased swelling or fever) and call if such occurs. Keep the wound clean and dry. You may remove the bandage to shower as usual after the first 24 hours to prevent crusting over suture knots, but do not soak the area in water (no tubs or swimming).  After removing the bandage, wash the area with soap and water at least twice daily unless more frequently soiled, then clean more often. Clean old antibiotic ointment away before new application.  After 48 hours, then leave open to air and do not bandage. Do not use neosporin/polysporin, hydrogen peroxide or alcohol. You were prescribed topical bactroban for a antimicrobial.  All wounds will leave a scar. To prevent the scar from bleaching in the sun make sure to wear sunscreen in the sun after the wound has healed. Do not apply a great deal of tension or stress to the suture line. Tetanus vaccination status reviewed: Tdap vaccination indicated and given today.  Tylenol with food as needed for pain relief. Follow up with urgent care as needed

## 2025-06-03 ENCOUNTER — OFFICE VISIT (OUTPATIENT)
Dept: CARDIOLOGY | Facility: CLINIC | Age: 80
End: 2025-06-03
Payer: MEDICARE

## 2025-06-03 VITALS
HEIGHT: 64 IN | WEIGHT: 125.88 LBS | HEART RATE: 52 BPM | BODY MASS INDEX: 21.49 KG/M2 | DIASTOLIC BLOOD PRESSURE: 66 MMHG | SYSTOLIC BLOOD PRESSURE: 139 MMHG | OXYGEN SATURATION: 96 %

## 2025-06-03 DIAGNOSIS — R00.2 PALPITATIONS: Primary | ICD-10-CM

## 2025-06-03 DIAGNOSIS — I25.84 CORONARY ARTERY DISEASE DUE TO CALCIFIED CORONARY LESION: ICD-10-CM

## 2025-06-03 DIAGNOSIS — I44.7 LBBB (LEFT BUNDLE BRANCH BLOCK): ICD-10-CM

## 2025-06-03 DIAGNOSIS — R00.2 PALPITATIONS: ICD-10-CM

## 2025-06-03 DIAGNOSIS — I25.10 CORONARY ARTERY DISEASE DUE TO CALCIFIED CORONARY LESION: ICD-10-CM

## 2025-06-03 DIAGNOSIS — I42.9 CARDIOMYOPATHY, UNSPECIFIED TYPE: ICD-10-CM

## 2025-06-03 DIAGNOSIS — I77.810 ASCENDING AORTA DILATION: Primary | ICD-10-CM

## 2025-06-03 DIAGNOSIS — I10 ESSENTIAL HYPERTENSION: ICD-10-CM

## 2025-06-03 PROCEDURE — 99999 PR PBB SHADOW E&M-EST. PATIENT-LVL III: CPT | Mod: PBBFAC,,, | Performed by: PHYSICIAN ASSISTANT

## 2025-06-03 PROCEDURE — 93010 ELECTROCARDIOGRAM REPORT: CPT | Mod: S$GLB,,, | Performed by: INTERNAL MEDICINE

## 2025-06-04 LAB
OHS QRS DURATION: 152 MS
OHS QTC CALCULATION: 429 MS

## 2025-06-18 DIAGNOSIS — Z78.0 MENOPAUSE: ICD-10-CM

## 2025-06-21 NOTE — TELEPHONE ENCOUNTER
----- Message from Jessenia Valles MA sent at 11/13/2018  4:44 PM CST -----  Contact: Self [phone]  Dr amanda saw her today.    ----- Message -----  From: Lenka Colbert  Sent: 11/13/2018   4:02 PM  To: [facility] Staff    Pt will like speak to the doctor or nurse in regards to sulfamethoxazole-trimethoprim 800-160mg (BACTRIM DS), Pt states she took the medication, and now have and itch in the growing area and would like the doctor to give her something for it.     Please return to the emergency room immediately if you have new or worsening symptoms. Symptoms of concern include changing or worsening pain, weakness, vomiting, or fever did necessitate immediate return.    Thank you for allowing us to take care of you today. While you are home, you might receive a survey about your care in our hospital. Your nurse and myself, Dylan Watson CNP, would love your honest feedback on your care. Your feedback and especially your positive comments help our hospital receive the support we need to continue to serve you and your family. Thank you again for trusting us with your care.

## 2025-06-30 DIAGNOSIS — F41.9 ANXIETY: ICD-10-CM

## 2025-06-30 RX ORDER — ALPRAZOLAM 0.25 MG/1
0.25 TABLET ORAL DAILY PRN
Qty: 20 TABLET | Refills: 1 | Status: SHIPPED | OUTPATIENT
Start: 2025-06-30

## 2025-06-30 NOTE — TELEPHONE ENCOUNTER
No care due was identified.  [facility] Embedded Care Due Messages. Reference number: [id number].   6/30/2025 10:50:04 AM CDT

## 2025-08-14 DIAGNOSIS — F41.9 ANXIETY: ICD-10-CM

## 2025-08-14 RX ORDER — ALPRAZOLAM 0.25 MG/1
0.25 TABLET ORAL DAILY PRN
Qty: 20 TABLET | Refills: 1 | Status: SHIPPED | OUTPATIENT
Start: 2025-08-14

## 2025-08-18 DIAGNOSIS — I10 ESSENTIAL HYPERTENSION: Primary | ICD-10-CM

## 2025-08-18 RX ORDER — VALSARTAN 320 MG/1
320 TABLET ORAL DAILY
Qty: 90 TABLET | Refills: 3 | Status: SHIPPED | OUTPATIENT
Start: 2025-08-18